# Patient Record
Sex: MALE | Race: WHITE | Employment: OTHER | URBAN - METROPOLITAN AREA
[De-identification: names, ages, dates, MRNs, and addresses within clinical notes are randomized per-mention and may not be internally consistent; named-entity substitution may affect disease eponyms.]

---

## 2020-01-01 ENCOUNTER — HOSPITAL ENCOUNTER (EMERGENCY)
Age: 65
Discharge: HOME | End: 2020-12-22
Attending: EMERGENCY MEDICINE

## 2020-01-01 ENCOUNTER — APPOINTMENT (OUTPATIENT)
Dept: GENERAL RADIOLOGY | Age: 65
DRG: 433 | End: 2020-01-01
Attending: EMERGENCY MEDICINE

## 2020-01-01 ENCOUNTER — HOSPITAL ENCOUNTER (INPATIENT)
Age: 65
LOS: 2 days | Discharge: HOME | DRG: 433 | End: 2020-12-12
Attending: EMERGENCY MEDICINE | Admitting: INTERNAL MEDICINE

## 2020-01-01 ENCOUNTER — HOSPITAL ENCOUNTER (EMERGENCY)
Age: 65
Discharge: HOME | End: 2020-10-16

## 2020-01-01 ENCOUNTER — APPOINTMENT (OUTPATIENT)
Dept: GENERAL RADIOLOGY | Age: 65
End: 2020-01-01

## 2020-01-01 ENCOUNTER — APPOINTMENT (OUTPATIENT)
Dept: INTERVENTIONAL RADIOLOGY/VASCULAR | Age: 65
DRG: 433 | End: 2020-01-01
Attending: INTERNAL MEDICINE

## 2020-01-01 ENCOUNTER — APPOINTMENT (OUTPATIENT)
Dept: ULTRASOUND IMAGING | Age: 65
End: 2020-01-01

## 2020-01-01 ENCOUNTER — APPOINTMENT (OUTPATIENT)
Dept: ULTRASOUND IMAGING | Age: 65
DRG: 433 | End: 2020-01-01
Attending: EMERGENCY MEDICINE

## 2020-01-01 VITALS
RESPIRATION RATE: 16 BRPM | SYSTOLIC BLOOD PRESSURE: 120 MMHG | TEMPERATURE: 97.7 F | HEART RATE: 89 BPM | HEIGHT: 78 IN | BODY MASS INDEX: 20.83 KG/M2 | WEIGHT: 180 LBS | OXYGEN SATURATION: 99 % | DIASTOLIC BLOOD PRESSURE: 71 MMHG

## 2020-01-01 VITALS
HEIGHT: 78 IN | DIASTOLIC BLOOD PRESSURE: 103 MMHG | TEMPERATURE: 98.6 F | OXYGEN SATURATION: 97 % | HEART RATE: 99 BPM | RESPIRATION RATE: 18 BRPM | WEIGHT: 180 LBS | BODY MASS INDEX: 20.83 KG/M2 | SYSTOLIC BLOOD PRESSURE: 145 MMHG

## 2020-01-01 VITALS
BODY MASS INDEX: 20.83 KG/M2 | WEIGHT: 180 LBS | DIASTOLIC BLOOD PRESSURE: 72 MMHG | TEMPERATURE: 98.4 F | SYSTOLIC BLOOD PRESSURE: 102 MMHG | RESPIRATION RATE: 16 BRPM | OXYGEN SATURATION: 98 % | HEART RATE: 84 BPM | HEIGHT: 78 IN

## 2020-01-01 DIAGNOSIS — H44.19 ENDOGENOUS ENDOPHTHALMITIS: Primary | ICD-10-CM

## 2020-01-01 DIAGNOSIS — R60.0 EXTREMITY EDEMA: Primary | ICD-10-CM

## 2020-01-01 DIAGNOSIS — E87.1 HYPONATREMIA: ICD-10-CM

## 2020-01-01 DIAGNOSIS — K70.31 ALCOHOLIC CIRRHOSIS OF LIVER WITH ASCITES (HCC): Primary | ICD-10-CM

## 2020-01-01 LAB
ALBUMIN FLD-MCNC: 0.6 G/DL
ALBUMIN SERPL-MCNC: 1.9 G/DL (ref 3.2–4.6)
ALBUMIN SERPL-MCNC: 2.2 G/DL (ref 3.2–4.6)
ALBUMIN SERPL-MCNC: 2.3 G/DL (ref 3.2–4.6)
ALBUMIN SERPL-MCNC: 2.9 G/DL (ref 3.2–4.6)
ALBUMIN/GLOB SERPL: 0.5 {RATIO} (ref 1.2–3.5)
ALBUMIN/GLOB SERPL: 0.6 {RATIO} (ref 1.2–3.5)
ALBUMIN/GLOB SERPL: 0.6 {RATIO} (ref 1.2–3.5)
ALBUMIN/GLOB SERPL: 0.7 {RATIO} (ref 1.2–3.5)
ALP SERPL-CCNC: 117 U/L (ref 50–136)
ALP SERPL-CCNC: 66 U/L (ref 50–136)
ALP SERPL-CCNC: 74 U/L (ref 50–136)
ALP SERPL-CCNC: 80 U/L (ref 50–136)
ALT SERPL-CCNC: 33 U/L (ref 12–65)
ALT SERPL-CCNC: 33 U/L (ref 12–65)
ALT SERPL-CCNC: 45 U/L (ref 12–65)
ALT SERPL-CCNC: 65 U/L (ref 12–65)
ANION GAP SERPL CALC-SCNC: 11 MMOL/L (ref 7–16)
ANION GAP SERPL CALC-SCNC: 5 MMOL/L (ref 7–16)
ANION GAP SERPL CALC-SCNC: 7 MMOL/L (ref 7–16)
ANION GAP SERPL CALC-SCNC: 9 MMOL/L (ref 7–16)
APPEARANCE FLD: CLEAR
APTT PPP: 36.4 SEC (ref 24.1–35.1)
AST SERPL-CCNC: 106 U/L (ref 15–37)
AST SERPL-CCNC: 209 U/L (ref 15–37)
AST SERPL-CCNC: 75 U/L (ref 15–37)
AST SERPL-CCNC: 81 U/L (ref 15–37)
ATRIAL RATE: 84 BPM
BACTERIA SPEC CULT: NORMAL
BASOPHILS # BLD: 0 K/UL (ref 0–0.2)
BASOPHILS # BLD: 0 K/UL (ref 0–0.2)
BASOPHILS # BLD: 0.1 K/UL (ref 0–0.2)
BASOPHILS # BLD: 0.1 K/UL (ref 0–0.2)
BASOPHILS NFR BLD: 1 % (ref 0–2)
BILIRUB SERPL-MCNC: 0.7 MG/DL (ref 0.2–1.1)
BILIRUB SERPL-MCNC: 3.9 MG/DL (ref 0.2–1.1)
BILIRUB SERPL-MCNC: 4.1 MG/DL (ref 0.2–1.1)
BILIRUB SERPL-MCNC: 4.1 MG/DL (ref 0.2–1.1)
BNP SERPL-MCNC: 531 PG/ML (ref 5–125)
BNP SERPL-MCNC: 85 PG/ML (ref 5–125)
BUN SERPL-MCNC: 3 MG/DL (ref 8–23)
BUN SERPL-MCNC: 5 MG/DL (ref 8–23)
BUN SERPL-MCNC: 7 MG/DL (ref 8–23)
BUN SERPL-MCNC: 7 MG/DL (ref 8–23)
CALCIUM SERPL-MCNC: 7.7 MG/DL (ref 8.3–10.4)
CALCIUM SERPL-MCNC: 7.8 MG/DL (ref 8.3–10.4)
CALCIUM SERPL-MCNC: 8 MG/DL (ref 8.3–10.4)
CALCIUM SERPL-MCNC: 8.7 MG/DL (ref 8.3–10.4)
CALCULATED P AXIS, ECG09: 70 DEGREES
CALCULATED R AXIS, ECG10: 67 DEGREES
CALCULATED T AXIS, ECG11: 52 DEGREES
CHLORIDE SERPL-SCNC: 94 MMOL/L (ref 98–107)
CHLORIDE SERPL-SCNC: 96 MMOL/L (ref 98–107)
CHLORIDE SERPL-SCNC: 98 MMOL/L (ref 98–107)
CHLORIDE SERPL-SCNC: 99 MMOL/L (ref 98–107)
CO2 SERPL-SCNC: 21 MMOL/L (ref 21–32)
CO2 SERPL-SCNC: 24 MMOL/L (ref 21–32)
CO2 SERPL-SCNC: 25 MMOL/L (ref 21–32)
CO2 SERPL-SCNC: 26 MMOL/L (ref 21–32)
COLOR FLD: YELLOW
CREAT SERPL-MCNC: 0.41 MG/DL (ref 0.8–1.5)
CREAT SERPL-MCNC: 0.45 MG/DL (ref 0.8–1.5)
CREAT SERPL-MCNC: 0.53 MG/DL (ref 0.8–1.5)
CREAT SERPL-MCNC: 0.54 MG/DL (ref 0.8–1.5)
DIAGNOSIS, 93000: NORMAL
DIFFERENTIAL METHOD BLD: ABNORMAL
EOSINOPHIL # BLD: 0 K/UL (ref 0–0.8)
EOSINOPHIL # BLD: 0.1 K/UL (ref 0–0.8)
EOSINOPHIL NFR BLD: 1 % (ref 0.5–7.8)
EOSINOPHIL NFR BLD: 2 % (ref 0.5–7.8)
ERYTHROCYTE [DISTWIDTH] IN BLOOD BY AUTOMATED COUNT: 14.1 % (ref 11.9–14.6)
ERYTHROCYTE [DISTWIDTH] IN BLOOD BY AUTOMATED COUNT: 15.7 % (ref 11.9–14.6)
ERYTHROCYTE [DISTWIDTH] IN BLOOD BY AUTOMATED COUNT: 15.8 % (ref 11.9–14.6)
ERYTHROCYTE [DISTWIDTH] IN BLOOD BY AUTOMATED COUNT: 15.9 % (ref 11.9–14.6)
GLOBULIN SER CALC-MCNC: 3.3 G/DL (ref 2.3–3.5)
GLOBULIN SER CALC-MCNC: 3.3 G/DL (ref 2.3–3.5)
GLOBULIN SER CALC-MCNC: 4.2 G/DL (ref 2.3–3.5)
GLOBULIN SER CALC-MCNC: 4.7 G/DL (ref 2.3–3.5)
GLUCOSE SERPL-MCNC: 77 MG/DL (ref 65–100)
GLUCOSE SERPL-MCNC: 87 MG/DL (ref 65–100)
GLUCOSE SERPL-MCNC: 92 MG/DL (ref 65–100)
GLUCOSE SERPL-MCNC: 95 MG/DL (ref 65–100)
GRAM STN SPEC: NORMAL
GRAM STN SPEC: NORMAL
HAV IGM SER QL: NONREACTIVE
HBV CORE IGM SER QL: NONREACTIVE
HBV SURFACE AG SER QL: NONREACTIVE
HCT VFR BLD AUTO: 29.8 % (ref 41.1–50.3)
HCT VFR BLD AUTO: 30.1 % (ref 41.1–50.3)
HCT VFR BLD AUTO: 36.4 % (ref 41.1–50.3)
HCT VFR BLD AUTO: 40.9 % (ref 41.1–50.3)
HCV AB SER QL: REACTIVE
HCV GENOTYPE: NORMAL
HCV GENTYP SERPL NAA+PROBE: NORMAL
HCV RNA SERPL NAA+PROBE-ACNC: 3410 IU/ML
HCV RNA SERPL NAA+PROBE-LOG IU: 3.53 LOG10 IU/ML
HGB BLD-MCNC: 11.2 G/DL (ref 13.6–17.2)
HGB BLD-MCNC: 11.2 G/DL (ref 13.6–17.2)
HGB BLD-MCNC: 13.4 G/DL (ref 13.6–17.2)
HGB BLD-MCNC: 14.5 G/DL (ref 13.6–17.2)
IMM GRANULOCYTES # BLD AUTO: 0 K/UL (ref 0–0.5)
IMM GRANULOCYTES # BLD AUTO: 0.1 K/UL (ref 0–0.5)
IMM GRANULOCYTES NFR BLD AUTO: 0 % (ref 0–5)
IMM GRANULOCYTES NFR BLD AUTO: 1 % (ref 0–5)
INR PPP: 1.4
LDH FLD L TO P-CCNC: 89 U/L
LYMPHOCYTES # BLD: 1.3 K/UL (ref 0.5–4.6)
LYMPHOCYTES # BLD: 1.5 K/UL (ref 0.5–4.6)
LYMPHOCYTES # BLD: 1.5 K/UL (ref 0.5–4.6)
LYMPHOCYTES # BLD: 2.1 K/UL (ref 0.5–4.6)
LYMPHOCYTES NFR BLD: 24 % (ref 13–44)
LYMPHOCYTES NFR BLD: 29 % (ref 13–44)
LYMPHOCYTES NFR BLD: 30 % (ref 13–44)
LYMPHOCYTES NFR BLD: 39 % (ref 13–44)
LYMPHOCYTES NFR BRONCH MANUAL: 67 %
MACROPHAGES NFR BRONCH MANUAL: 5 %
MAGNESIUM SERPL-MCNC: 2.1 MG/DL (ref 1.8–2.4)
MCH RBC QN AUTO: 36.6 PG (ref 26.1–32.9)
MCH RBC QN AUTO: 37.1 PG (ref 26.1–32.9)
MCH RBC QN AUTO: 38.2 PG (ref 26.1–32.9)
MCH RBC QN AUTO: 38.2 PG (ref 26.1–32.9)
MCHC RBC AUTO-ENTMCNC: 35.5 G/DL (ref 31.4–35)
MCHC RBC AUTO-ENTMCNC: 36.8 G/DL (ref 31.4–35)
MCHC RBC AUTO-ENTMCNC: 37.2 G/DL (ref 31.4–35)
MCHC RBC AUTO-ENTMCNC: 37.6 G/DL (ref 31.4–35)
MCV RBC AUTO: 101.7 FL (ref 79.6–97.8)
MCV RBC AUTO: 107.6 FL (ref 79.6–97.8)
MCV RBC AUTO: 99.5 FL (ref 79.6–97.8)
MCV RBC AUTO: 99.7 FL (ref 79.6–97.8)
MM INDURATION POC: NORMAL (ref 0–5)
MONOCYTES # BLD: 0.5 K/UL (ref 0.1–1.3)
MONOCYTES # BLD: 0.8 K/UL (ref 0.1–1.3)
MONOCYTES # BLD: 0.8 K/UL (ref 0.1–1.3)
MONOCYTES # BLD: 1 K/UL (ref 0.1–1.3)
MONOCYTES NFR BLD: 15 % (ref 4–12)
MONOCYTES NFR BLD: 16 % (ref 4–12)
MONOCYTES NFR BLD: 17 % (ref 4–12)
MONOCYTES NFR BLD: 9 % (ref 4–12)
NEUTROPHILS NFR BRONCH MANUAL: 28 %
NEUTS SEG # BLD: 2.3 K/UL (ref 1.7–8.2)
NEUTS SEG # BLD: 2.6 K/UL (ref 1.7–8.2)
NEUTS SEG # BLD: 2.7 K/UL (ref 1.7–8.2)
NEUTS SEG # BLD: 3.6 K/UL (ref 1.7–8.2)
NEUTS SEG NFR BLD: 49 % (ref 43–78)
NEUTS SEG NFR BLD: 51 % (ref 43–78)
NEUTS SEG NFR BLD: 53 % (ref 43–78)
NEUTS SEG NFR BLD: 57 % (ref 43–78)
NRBC # BLD: 0 K/UL (ref 0–0.2)
NUC CELL # FLD: 307 /CU MM
OTHER CELLS NFR BLD MANUAL: 239 %
P-R INTERVAL, ECG05: 144 MS
PLATELET # BLD AUTO: 149 K/UL (ref 150–450)
PLATELET # BLD AUTO: 46 K/UL (ref 150–450)
PLATELET # BLD AUTO: 48 K/UL (ref 150–450)
PLATELET # BLD AUTO: 56 K/UL (ref 150–450)
PLEASE NOTE, 550474: NORMAL
PMV BLD AUTO: 11.4 FL (ref 9.4–12.3)
PMV BLD AUTO: 11.7 FL (ref 9.4–12.3)
PMV BLD AUTO: 11.7 FL (ref 9.4–12.3)
PMV BLD AUTO: 12.2 FL (ref 9.4–12.3)
POTASSIUM SERPL-SCNC: 2.9 MMOL/L (ref 3.5–5.1)
POTASSIUM SERPL-SCNC: 3.6 MMOL/L (ref 3.5–5.1)
POTASSIUM SERPL-SCNC: 3.8 MMOL/L (ref 3.5–5.1)
POTASSIUM SERPL-SCNC: 4.2 MMOL/L (ref 3.5–5.1)
POTASSIUM SERPL-SCNC: 5.4 MMOL/L (ref 3.5–5.1)
PPD POC: NORMAL
PROT FLD-MCNC: 1.3 G/DL
PROT SERPL-MCNC: 5.2 G/DL (ref 6.3–8.2)
PROT SERPL-MCNC: 5.5 G/DL (ref 6.3–8.2)
PROT SERPL-MCNC: 6.5 G/DL (ref 6.3–8.2)
PROT SERPL-MCNC: 7.6 G/DL (ref 6.3–8.2)
PROTHROMBIN TIME: 17.5 SEC (ref 12.5–14.7)
Q-T INTERVAL, ECG07: 408 MS
QRS DURATION, ECG06: 90 MS
QTC CALCULATION (BEZET), ECG08: 482 MS
RBC # BLD AUTO: 2.93 M/UL (ref 4.23–5.6)
RBC # BLD AUTO: 3.02 M/UL (ref 4.23–5.6)
RBC # BLD AUTO: 3.66 M/UL (ref 4.23–5.6)
RBC # BLD AUTO: 3.8 M/UL (ref 4.23–5.6)
RBC # FLD: 1000 /CU MM
SERVICE CMNT-IMP: NORMAL
SODIUM SERPL-SCNC: 124 MMOL/L (ref 138–145)
SODIUM SERPL-SCNC: 128 MMOL/L (ref 136–145)
SODIUM SERPL-SCNC: 131 MMOL/L (ref 136–145)
SODIUM SERPL-SCNC: 132 MMOL/L (ref 136–145)
SPECIMEN SOURCE FLD: NORMAL
TEST INFORMATION, 550045: NORMAL
VENTRICULAR RATE, ECG03: 84 BPM
VIT B12 SERPL-MCNC: 1510 PG/ML (ref 193–986)
WBC # BLD AUTO: 4.5 K/UL (ref 4.3–11.1)
WBC # BLD AUTO: 4.9 K/UL (ref 4.3–11.1)
WBC # BLD AUTO: 5.5 K/UL (ref 4.3–11.1)
WBC # BLD AUTO: 6.2 K/UL (ref 4.3–11.1)

## 2020-01-01 PROCEDURE — 74011250637 HC RX REV CODE- 250/637: Performed by: INTERNAL MEDICINE

## 2020-01-01 PROCEDURE — 76705 ECHO EXAM OF ABDOMEN: CPT

## 2020-01-01 PROCEDURE — 84132 ASSAY OF SERUM POTASSIUM: CPT

## 2020-01-01 PROCEDURE — 93005 ELECTROCARDIOGRAM TRACING: CPT | Performed by: EMERGENCY MEDICINE

## 2020-01-01 PROCEDURE — 74011250636 HC RX REV CODE- 250/636: Performed by: EMERGENCY MEDICINE

## 2020-01-01 PROCEDURE — 71045 X-RAY EXAM CHEST 1 VIEW: CPT

## 2020-01-01 PROCEDURE — 77030014146 HC TY THORCENT/PARACENT BD -B

## 2020-01-01 PROCEDURE — 93005 ELECTROCARDIOGRAM TRACING: CPT

## 2020-01-01 PROCEDURE — 89050 BODY FLUID CELL COUNT: CPT

## 2020-01-01 PROCEDURE — 86580 TB INTRADERMAL TEST: CPT | Performed by: INTERNAL MEDICINE

## 2020-01-01 PROCEDURE — 83880 ASSAY OF NATRIURETIC PEPTIDE: CPT

## 2020-01-01 PROCEDURE — 49083 ABD PARACENTESIS W/IMAGING: CPT

## 2020-01-01 PROCEDURE — 80053 COMPREHEN METABOLIC PANEL: CPT

## 2020-01-01 PROCEDURE — 97535 SELF CARE MNGMENT TRAINING: CPT

## 2020-01-01 PROCEDURE — 82042 OTHER SOURCE ALBUMIN QUAN EA: CPT

## 2020-01-01 PROCEDURE — 96374 THER/PROPH/DIAG INJ IV PUSH: CPT

## 2020-01-01 PROCEDURE — 87522 HEPATITIS C REVRS TRNSCRPJ: CPT

## 2020-01-01 PROCEDURE — 85025 COMPLETE CBC W/AUTO DIFF WBC: CPT

## 2020-01-01 PROCEDURE — 36415 COLL VENOUS BLD VENIPUNCTURE: CPT

## 2020-01-01 PROCEDURE — 97116 GAIT TRAINING THERAPY: CPT

## 2020-01-01 PROCEDURE — 74011250636 HC RX REV CODE- 250/636: Performed by: INTERNAL MEDICINE

## 2020-01-01 PROCEDURE — 87902 NFCT AGT GNTYP ALYS HEP C: CPT

## 2020-01-01 PROCEDURE — 84157 ASSAY OF PROTEIN OTHER: CPT

## 2020-01-01 PROCEDURE — 85610 PROTHROMBIN TIME: CPT

## 2020-01-01 PROCEDURE — 82607 VITAMIN B-12: CPT

## 2020-01-01 PROCEDURE — 97165 OT EVAL LOW COMPLEX 30 MIN: CPT

## 2020-01-01 PROCEDURE — P9045 ALBUMIN (HUMAN), 5%, 250 ML: HCPCS | Performed by: INTERNAL MEDICINE

## 2020-01-01 PROCEDURE — 71046 X-RAY EXAM CHEST 2 VIEWS: CPT

## 2020-01-01 PROCEDURE — 99284 EMERGENCY DEPT VISIT MOD MDM: CPT

## 2020-01-01 PROCEDURE — 99283 EMERGENCY DEPT VISIT LOW MDM: CPT

## 2020-01-01 PROCEDURE — 80074 ACUTE HEPATITIS PANEL: CPT

## 2020-01-01 PROCEDURE — 74011000302 HC RX REV CODE- 302: Performed by: INTERNAL MEDICINE

## 2020-01-01 PROCEDURE — 97112 NEUROMUSCULAR REEDUCATION: CPT

## 2020-01-01 PROCEDURE — 65270000029 HC RM PRIVATE

## 2020-01-01 PROCEDURE — 87205 SMEAR GRAM STAIN: CPT

## 2020-01-01 PROCEDURE — 77030010507 HC ADH SKN DERMBND J&J -B

## 2020-01-01 PROCEDURE — 97110 THERAPEUTIC EXERCISES: CPT

## 2020-01-01 PROCEDURE — 0W9G3ZZ DRAINAGE OF PERITONEAL CAVITY, PERCUTANEOUS APPROACH: ICD-10-PCS | Performed by: RADIOLOGY

## 2020-01-01 PROCEDURE — 85730 THROMBOPLASTIN TIME PARTIAL: CPT

## 2020-01-01 PROCEDURE — 83615 LACTATE (LD) (LDH) ENZYME: CPT

## 2020-01-01 PROCEDURE — 93971 EXTREMITY STUDY: CPT

## 2020-01-01 PROCEDURE — 97161 PT EVAL LOW COMPLEX 20 MIN: CPT

## 2020-01-01 PROCEDURE — 83735 ASSAY OF MAGNESIUM: CPT

## 2020-01-01 RX ORDER — FAMOTIDINE 20 MG/1
20 TABLET, FILM COATED ORAL 2 TIMES DAILY
Status: DISCONTINUED | OUTPATIENT
Start: 2020-01-01 | End: 2020-01-01 | Stop reason: HOSPADM

## 2020-01-01 RX ORDER — LORAZEPAM 1 MG/1
1 TABLET ORAL
Qty: 9 TAB | Refills: 0 | Status: SHIPPED | OUTPATIENT
Start: 2020-01-01

## 2020-01-01 RX ORDER — ONDANSETRON 2 MG/ML
4 INJECTION INTRAMUSCULAR; INTRAVENOUS
Status: DISCONTINUED | OUTPATIENT
Start: 2020-01-01 | End: 2020-01-01 | Stop reason: HOSPADM

## 2020-01-01 RX ORDER — FUROSEMIDE 40 MG/1
40 TABLET ORAL DAILY
Qty: 30 TAB | Refills: 0 | Status: SHIPPED | OUTPATIENT
Start: 2020-01-01 | End: 2021-01-01

## 2020-01-01 RX ORDER — FOLIC ACID 1 MG/1
1 TABLET ORAL DAILY
Qty: 30 TAB | Refills: 0 | Status: SHIPPED | OUTPATIENT
Start: 2020-01-01 | End: 2021-01-01

## 2020-01-01 RX ORDER — LORAZEPAM 2 MG/ML
1 INJECTION INTRAMUSCULAR
Status: DISCONTINUED | OUTPATIENT
Start: 2020-01-01 | End: 2020-01-01 | Stop reason: HOSPADM

## 2020-01-01 RX ORDER — POTASSIUM CHLORIDE 20 MEQ/1
40 TABLET, EXTENDED RELEASE ORAL
Status: COMPLETED | OUTPATIENT
Start: 2020-01-01 | End: 2020-01-01

## 2020-01-01 RX ORDER — SPIRONOLACTONE 25 MG/1
25 TABLET ORAL DAILY
Qty: 30 TAB | Refills: 0 | Status: SHIPPED | OUTPATIENT
Start: 2020-01-01 | End: 2021-01-01

## 2020-01-01 RX ORDER — FUROSEMIDE 10 MG/ML
40 INJECTION INTRAMUSCULAR; INTRAVENOUS
Status: COMPLETED | OUTPATIENT
Start: 2020-01-01 | End: 2020-01-01

## 2020-01-01 RX ORDER — ALBUMIN HUMAN 50 G/1000ML
25 SOLUTION INTRAVENOUS ONCE
Status: COMPLETED | OUTPATIENT
Start: 2020-01-01 | End: 2020-01-01

## 2020-01-01 RX ORDER — LANOLIN ALCOHOL/MO/W.PET/CERES
400 CREAM (GRAM) TOPICAL DAILY
Status: DISCONTINUED | OUTPATIENT
Start: 2020-01-01 | End: 2020-01-01 | Stop reason: HOSPADM

## 2020-01-01 RX ORDER — POTASSIUM CHLORIDE 14.9 MG/ML
20 INJECTION INTRAVENOUS
Status: DISCONTINUED | OUTPATIENT
Start: 2020-01-01 | End: 2020-01-01

## 2020-01-01 RX ORDER — ACETAMINOPHEN 325 MG/1
650 TABLET ORAL
Status: DISCONTINUED | OUTPATIENT
Start: 2020-01-01 | End: 2020-01-01

## 2020-01-01 RX ORDER — FUROSEMIDE 20 MG/1
40 TABLET ORAL 2 TIMES DAILY
Status: DISCONTINUED | OUTPATIENT
Start: 2020-01-01 | End: 2020-01-01 | Stop reason: HOSPADM

## 2020-01-01 RX ORDER — SODIUM CHLORIDE 9 MG/ML
50 INJECTION, SOLUTION INTRAVENOUS CONTINUOUS
Status: DISCONTINUED | OUTPATIENT
Start: 2020-01-01 | End: 2020-01-01

## 2020-01-01 RX ORDER — SPIRONOLACTONE 25 MG/1
25 TABLET ORAL DAILY
Status: DISCONTINUED | OUTPATIENT
Start: 2020-01-01 | End: 2020-01-01

## 2020-01-01 RX ORDER — LANOLIN ALCOHOL/MO/W.PET/CERES
400 CREAM (GRAM) TOPICAL DAILY
Qty: 30 TAB | Refills: 0 | Status: SHIPPED | OUTPATIENT
Start: 2020-01-01 | End: 2021-01-01

## 2020-01-01 RX ORDER — FUROSEMIDE 10 MG/ML
40 INJECTION INTRAMUSCULAR; INTRAVENOUS 2 TIMES DAILY
Status: DISCONTINUED | OUTPATIENT
Start: 2020-01-01 | End: 2020-01-01

## 2020-01-01 RX ORDER — LANOLIN ALCOHOL/MO/W.PET/CERES
100 CREAM (GRAM) TOPICAL DAILY
Status: DISCONTINUED | OUTPATIENT
Start: 2020-01-01 | End: 2020-01-01 | Stop reason: HOSPADM

## 2020-01-01 RX ORDER — POTASSIUM CHLORIDE 20 MEQ/1
40 TABLET, EXTENDED RELEASE ORAL 2 TIMES DAILY
Status: DISCONTINUED | OUTPATIENT
Start: 2020-01-01 | End: 2020-01-01

## 2020-01-01 RX ORDER — LANOLIN ALCOHOL/MO/W.PET/CERES
100 CREAM (GRAM) TOPICAL DAILY
Qty: 30 TAB | Refills: 0 | Status: SHIPPED | OUTPATIENT
Start: 2020-01-01 | End: 2021-01-01

## 2020-01-01 RX ORDER — IBUPROFEN 200 MG
1 TABLET ORAL EVERY 24 HOURS
Status: DISCONTINUED | OUTPATIENT
Start: 2020-01-01 | End: 2020-01-01 | Stop reason: HOSPADM

## 2020-01-01 RX ORDER — SPIRONOLACTONE 25 MG/1
50 TABLET ORAL DAILY
Status: DISCONTINUED | OUTPATIENT
Start: 2020-01-01 | End: 2020-01-01 | Stop reason: HOSPADM

## 2020-01-01 RX ORDER — FUROSEMIDE 40 MG/1
40 TABLET ORAL DAILY
Qty: 20 TAB | Refills: 0 | Status: SHIPPED | OUTPATIENT
Start: 2020-01-01 | End: 2020-01-01

## 2020-01-01 RX ADMIN — SPIRONOLACTONE 50 MG: 25 TABLET ORAL at 10:08

## 2020-01-01 RX ADMIN — Medication 100 MG: at 09:57

## 2020-01-01 RX ADMIN — SODIUM CHLORIDE 50 ML/HR: 900 INJECTION, SOLUTION INTRAVENOUS at 21:54

## 2020-01-01 RX ADMIN — TUBERCULIN PURIFIED PROTEIN DERIVATIVE 5 UNITS: 5 INJECTION, SOLUTION INTRADERMAL at 21:51

## 2020-01-01 RX ADMIN — ALBUMIN (HUMAN) 25 G: 12.5 INJECTION, SOLUTION INTRAVENOUS at 08:15

## 2020-01-01 RX ADMIN — FAMOTIDINE 20 MG: 20 TABLET, FILM COATED ORAL at 10:08

## 2020-01-01 RX ADMIN — Medication 100 MG: at 10:08

## 2020-01-01 RX ADMIN — FAMOTIDINE 20 MG: 20 TABLET, FILM COATED ORAL at 21:50

## 2020-01-01 RX ADMIN — FAMOTIDINE 20 MG: 20 TABLET, FILM COATED ORAL at 08:13

## 2020-01-01 RX ADMIN — POTASSIUM CHLORIDE 40 MEQ: 20 TABLET, EXTENDED RELEASE ORAL at 13:40

## 2020-01-01 RX ADMIN — FUROSEMIDE 40 MG: 10 INJECTION, SOLUTION INTRAMUSCULAR; INTRAVENOUS at 18:11

## 2020-01-01 RX ADMIN — FUROSEMIDE 40 MG: 10 INJECTION, SOLUTION INTRAMUSCULAR; INTRAVENOUS at 08:13

## 2020-01-01 RX ADMIN — POTASSIUM CHLORIDE 40 MEQ: 20 TABLET, EXTENDED RELEASE ORAL at 13:17

## 2020-01-01 RX ADMIN — Medication 400 MG: at 13:17

## 2020-01-01 RX ADMIN — FAMOTIDINE 20 MG: 20 TABLET, FILM COATED ORAL at 18:11

## 2020-01-01 RX ADMIN — FUROSEMIDE 40 MG: 10 INJECTION, SOLUTION INTRAMUSCULAR; INTRAVENOUS at 14:56

## 2020-01-01 RX ADMIN — SPIRONOLACTONE 25 MG: 25 TABLET ORAL at 09:57

## 2020-10-16 NOTE — ED PROVIDER NOTES
50-year-old male complaining of left lower extremity swelling. He also has some swelling his right leg but not as bad. Been gone for over a week. Patient does not have any medical problems but does not go to a physician in many many years. Patient denies chest pain shortness of breath no injuries to the legs. Leg Swelling This is a new problem. The current episode started more than 1 week ago. The problem occurs constantly. The problem has been gradually worsening. The pain is present in the left lower leg and right lower leg. The quality of the pain is described as aching. The pain is at a severity of 7/10. The pain is moderate. Associated symptoms include stiffness. He has tried nothing for the symptoms. There has been no history of extremity trauma. No past medical history on file. No past surgical history on file. No family history on file. Social History Socioeconomic History  Marital status:  Spouse name: Not on file  Number of children: Not on file  Years of education: Not on file  Highest education level: Not on file Occupational History  Not on file Social Needs  Financial resource strain: Not on file  Food insecurity Worry: Not on file Inability: Not on file  Transportation needs Medical: Not on file Non-medical: Not on file Tobacco Use  Smoking status: Not on file Substance and Sexual Activity  Alcohol use: Not on file  Drug use: Not on file  Sexual activity: Not on file Lifestyle  Physical activity Days per week: Not on file Minutes per session: Not on file  Stress: Not on file Relationships  Social connections Talks on phone: Not on file Gets together: Not on file Attends Adventism service: Not on file Active member of club or organization: Not on file Attends meetings of clubs or organizations: Not on file Relationship status: Not on file  Intimate partner violence Fear of current or ex partner: Not on file Emotionally abused: Not on file Physically abused: Not on file Forced sexual activity: Not on file Other Topics Concern  Not on file Social History Narrative  Not on file ALLERGIES: Patient has no known allergies. Review of Systems Constitutional: Negative. Negative for activity change. HENT: Negative. Eyes: Negative. Respiratory: Negative. Cardiovascular: Negative. Gastrointestinal: Negative. Genitourinary: Negative. Musculoskeletal: Positive for stiffness. Skin: Negative. Neurological: Negative. Psychiatric/Behavioral: Negative. All other systems reviewed and are negative. Vitals:  
 10/16/20 1305 BP: (!) 146/88 Pulse: 88 Resp: 16 Temp: 99.5 °F (37.5 °C) SpO2: 97% Weight: 81.6 kg (180 lb) Height: 6' 7\" (2.007 m) Physical Exam 
Vitals signs and nursing note reviewed. Constitutional:   
   General: He is not in acute distress. Appearance: Normal appearance. He is well-developed. He is not ill-appearing, toxic-appearing or diaphoretic. HENT:  
   Head: Normocephalic and atraumatic. No right periorbital erythema or left periorbital erythema. Jaw: There is normal jaw occlusion. Salivary Glands: Right salivary gland is not diffusely enlarged. Left salivary gland is not diffusely enlarged. Right Ear: External ear normal.  
   Left Ear: External ear normal.  
   Nose: Nose normal. No congestion or rhinorrhea. Mouth/Throat:  
   Mouth: Mucous membranes are moist.  
   Pharynx: No oropharyngeal exudate or posterior oropharyngeal erythema. Eyes:  
   General: Lids are normal. No scleral icterus. Right eye: No discharge. Left eye: No discharge. Extraocular Movements: Extraocular movements intact. Conjunctiva/sclera: Conjunctivae normal.  
   Right eye: Right conjunctiva is not injected. Left eye: Left conjunctiva is not injected. Pupils: Pupils are equal, round, and reactive to light. Neck: Musculoskeletal: Full passive range of motion without pain, normal range of motion and neck supple. Normal range of motion. No erythema, neck rigidity, injury, pain with movement or muscular tenderness. Thyroid: No thyroid mass. Vascular: No JVD. Trachea: Trachea and phonation normal.  
Cardiovascular:  
   Rate and Rhythm: Normal rate and regular rhythm. Pulses: Normal pulses. Heart sounds: Normal heart sounds. Heart sounds not distant. No murmur. No friction rub. No gallop. Pulmonary:  
   Effort: Pulmonary effort is normal. No tachypnea, accessory muscle usage, respiratory distress or retractions. Breath sounds: No stridor. No decreased breath sounds, wheezing, rhonchi or rales. Chest:  
   Chest wall: No tenderness. Abdominal:  
   General: Abdomen is flat. Bowel sounds are normal. There is no distension. There are no signs of injury. Palpations: Abdomen is soft. There is no fluid wave, mass or pulsatile mass. Tenderness: There is no abdominal tenderness. There is no guarding or rebound. Musculoskeletal: Normal range of motion. General: Swelling present. No tenderness, deformity or signs of injury. Right lower leg: Edema present. Left lower leg: Edema present. Lymphadenopathy:  
   Cervical: No cervical adenopathy. Skin: 
   General: Skin is warm and dry. Capillary Refill: Capillary refill takes less than 2 seconds. Coloration: Skin is not jaundiced or pale. Findings: No bruising, erythema or rash. Neurological:  
   General: No focal deficit present. Mental Status: He is alert and oriented to person, place, and time. Mental status is at baseline. GCS: GCS eye subscore is 4. GCS verbal subscore is 5. GCS motor subscore is 6. Cranial Nerves: No dysarthria or facial asymmetry. Sensory: No sensory deficit. Motor: No weakness or tremor. Coordination: Coordination normal.  
Psychiatric:     
   Mood and Affect: Mood normal.     
   Behavior: Behavior normal. Behavior is cooperative. Thought Content: Thought content normal.     
   Judgment: Judgment normal.  
 
  
 
MDM Number of Diagnoses or Management Options Diagnosis management comments: Swelling to lower extremities pulses are good capillary refill is normal no signs of infection. Amount and/or Complexity of Data Reviewed Clinical lab tests: ordered and reviewed Tests in the radiology section of CPT®: ordered and reviewed Tests in the medicine section of CPT®: ordered and reviewed Risk of Complications, Morbidity, and/or Mortality Presenting problems: high Diagnostic procedures: high Management options: high Patient Progress Patient progress: stable Procedures

## 2020-10-16 NOTE — DISCHARGE INSTRUCTIONS
Patient Education        Leg and Ankle Edema: Care Instructions  Your Care Instructions  Swelling in the legs, ankles, and feet is called edema. It is common after you sit or stand for a while. Long plane flights or car rides often cause swelling in the legs and feet. You may also have swelling if you have to stand for long periods of time at your job. Problems with the veins in the legs (varicose veins) and changes in hormones can also cause swelling. Sometimes the swelling in the ankles and feet is caused by a more serious problem, such as heart failure, infection, blood clots, or liver or kidney disease. Follow-up care is a key part of your treatment and safety. Be sure to make and go to all appointments, and call your doctor if you are having problems. It's also a good idea to know your test results and keep a list of the medicines you take. How can you care for yourself at home? · If your doctor gave you medicine, take it as prescribed. Call your doctor if you think you are having a problem with your medicine. · Whenever you are resting, raise your legs up. Try to keep the swollen area higher than the level of your heart. · Take breaks from standing or sitting in one position. ? Walk around to increase the blood flow in your lower legs. ? Move your feet and ankles often while you stand, or tighten and relax your leg muscles. · Wear support stockings. Put them on in the morning, before swelling gets worse. · Eat a balanced diet. Lose weight if you need to. · Limit the amount of salt (sodium) in your diet. Salt holds fluid in the body and may increase swelling. When should you call for help? Call 911 anytime you think you may need emergency care. For example, call if:    · You have symptoms of a blood clot in your lung (called a pulmonary embolism). These may include:  ? Sudden chest pain. ? Trouble breathing. ? Coughing up blood.    Call your doctor now or seek immediate medical care if:    · You have signs of a blood clot, such as:  ? Pain in your calf, back of the knee, thigh, or groin. ? Redness and swelling in your leg or groin.     · You have symptoms of infection, such as:  ? Increased pain, swelling, warmth, or redness. ? Red streaks or pus. ? A fever. Watch closely for changes in your health, and be sure to contact your doctor if:    · Your swelling is getting worse.     · You have new or worsening pain in your legs.     · You do not get better as expected. Where can you learn more? Go to http://www.gray.com/  Enter O164 in the search box to learn more about \"Leg and Ankle Edema: Care Instructions. \"  Current as of: June 26, 2019               Content Version: 12.6  © 8490-3894 [employer]. Care instructions adapted under license by [employer] (which disclaims liability or warranty for this information). If you have questions about a medical condition or this instruction, always ask your healthcare professional. [address] any warranty or liability for your use of this information.

## 2020-10-16 NOTE — ED TRIAGE NOTES
Pt ambulatory to triage. Pt states he has BLE pitting edema x 1 week without hx of CHF or DVT. Pt denies fever, cough, SOB, or exposure to COVID. Pt denies cp. Pt states he has been cramping in hands and legs.

## 2020-12-10 PROBLEM — R19.7 DIARRHEA: Status: ACTIVE | Noted: 2020-01-01

## 2020-12-10 PROBLEM — F17.200 NICOTINE DEPENDENCE: Status: ACTIVE | Noted: 2020-01-01

## 2020-12-10 PROBLEM — Z66 DNR (DO NOT RESUSCITATE): Status: ACTIVE | Noted: 2020-01-01

## 2020-12-10 PROBLEM — R17 ELEVATED BILIRUBIN: Status: ACTIVE | Noted: 2020-01-01

## 2020-12-10 PROBLEM — R18.8 ASCITES: Status: ACTIVE | Noted: 2020-01-01

## 2020-12-10 PROBLEM — D69.6 THROMBOCYTOPENIA (HCC): Status: ACTIVE | Noted: 2020-01-01

## 2020-12-10 PROBLEM — E87.1 HYPONATREMIA: Status: ACTIVE | Noted: 2020-01-01

## 2020-12-10 PROBLEM — F10.20 ALCOHOL DEPENDENCE (HCC): Status: ACTIVE | Noted: 2020-01-01

## 2020-12-10 PROBLEM — K74.60 LIVER CIRRHOSIS (HCC): Status: ACTIVE | Noted: 2020-01-01

## 2020-12-10 NOTE — ED PROVIDER NOTES
71-year-old male presenting for worsening lower extremity edema. Seen 1 month ago for same. At that time reports the edema was below his knees. Now it is all the way up to his thighs and he feels like his abdomen is more distended. He does admit to daily drinking for many years. During his prior work-up DVT was ruled out the patient was discharged home on Lasix. His symptoms have not improved. Denies fevers chills. He is also complaining of some diarrhea and decreased appetite. The history is provided by the patient. Leg Swelling This is a recurrent problem. The current episode started more than 1 week ago. The problem has been gradually worsening. The pain is present in the left upper leg, left lower leg, right upper leg and right lower leg. The quality of the pain is described as aching. The pain is at a severity of 5/10. The pain is moderate. Pertinent negatives include no numbness, full range of motion, no stiffness, no tingling, no itching, no back pain and no neck pain. The symptoms are aggravated by palpation. He has tried nothing for the symptoms. The treatment provided no relief. There has been no history of extremity trauma. No past medical history on file. No past surgical history on file. No family history on file. Social History Socioeconomic History  Marital status:  Spouse name: Not on file  Number of children: Not on file  Years of education: Not on file  Highest education level: Not on file Occupational History  Not on file Social Needs  Financial resource strain: Not on file  Food insecurity Worry: Not on file Inability: Not on file  Transportation needs Medical: Not on file Non-medical: Not on file Tobacco Use  Smoking status: Not on file Substance and Sexual Activity  Alcohol use: Not on file  Drug use: Not on file  Sexual activity: Not on file Lifestyle  Physical activity Days per week: Not on file Minutes per session: Not on file  Stress: Not on file Relationships  Social connections Talks on phone: Not on file Gets together: Not on file Attends Sabianism service: Not on file Active member of club or organization: Not on file Attends meetings of clubs or organizations: Not on file Relationship status: Not on file  Intimate partner violence Fear of current or ex partner: Not on file Emotionally abused: Not on file Physically abused: Not on file Forced sexual activity: Not on file Other Topics Concern  Not on file Social History Narrative  Not on file ALLERGIES: Patient has no known allergies. Review of Systems Constitutional: Positive for activity change and appetite change. Cardiovascular: Positive for leg swelling. Gastrointestinal: Positive for abdominal distention. Musculoskeletal: Negative for back pain, neck pain and stiffness. Skin: Negative for itching. Neurological: Negative for tingling and numbness. All other systems reviewed and are negative. Vitals:  
 12/10/20 1053 BP: (!) 160/81 Pulse: 96  
Resp: 18 Temp: 97.6 °F (36.4 °C) SpO2: 96% Weight: 81.6 kg (180 lb) Height: 6' 7\" (2.007 m) Physical Exam 
Vitals signs and nursing note reviewed. Constitutional:   
   Appearance: He is well-developed. HENT:  
   Head: Normocephalic and atraumatic. Eyes:  
   Extraocular Movements: Extraocular movements intact. Conjunctiva/sclera: Conjunctivae normal.  
Neck: Musculoskeletal: Normal range of motion and neck supple. Cardiovascular:  
   Rate and Rhythm: Normal rate and regular rhythm. Heart sounds: Normal heart sounds. Pulmonary:  
   Effort: Pulmonary effort is normal.  
   Comments: Crackles at the bases bilaterally Abdominal:  
   General: Bowel sounds are normal. There is distension. Palpations: Abdomen is soft. Tenderness: There is no abdominal tenderness. Musculoskeletal: Normal range of motion. General: No deformity. Right lower leg: Edema present. Left lower leg: Edema present. Skin: 
   General: Skin is warm and dry. Neurological:  
   Mental Status: He is alert and oriented to person, place, and time. Cranial Nerves: No cranial nerve deficit. Psychiatric:     
   Behavior: Behavior normal.  
 
  
 
MDM Number of Diagnoses or Management Options Alcoholic cirrhosis of liver with ascites ([facility] [address]): Hyponatremia:  
Diagnosis management comments: 61-year-old male presenting for worsening lower extremity edema and abdominal distention. Likely new onset cirrhosis with portal hypertension resulting in lower extremity edema. Amount and/or Complexity of Data Reviewed Clinical lab tests: ordered and reviewed (Results for orders placed or performed during the hospital encounter of 12/10/20 
-CBC WITH AUTOMATED DIFF Result                      Value             Ref Range WBC                         6.2               4.3 - 11.1 K* 
     RBC                         3.66 (L)          4.23 - 5.6 M* HGB                         13.4 (L)          13.6 - 17.2 * HCT                         36.4 (L)          41.1 - 50.3 % MCV                         99.5 (H)          79.6 - 97.8 * MCH                         36.6 (H)          26.1 - 32.9 * MCHC                        36.8 (H)          31.4 - 35.0 * RDW                         15.7 (H)          11.9 - 14.6 % PLATELET                    56 (L)            150 - 450 K/* MPV                         11.7              9.4 - 12.3 FL ABSOLUTE NRBC               0.00              0.0 - 0.2 K/* DF                          AUTOMATED NEUTROPHILS                 57                43 - 78 % LYMPHOCYTES                 24                13 - 44 % MONOCYTES                   16 (H)            4.0 - 12.0 % EOSINOPHILS                 1                 0.5 - 7.8 % BASOPHILS                   1                 0.0 - 2.0 % IMMATURE GRANULOCYTES       1                 0.0 - 5.0 %   
     ABS. NEUTROPHILS            3.6               1.7 - 8.2 K/* ABS. LYMPHOCYTES            1.5               0.5 - 4.6 K/* ABS. MONOCYTES              1.0               0.1 - 1.3 K/* ABS. EOSINOPHILS            0.1               0.0 - 0.8 K/* ABS. BASOPHILS              0.1               0.0 - 0.2 K/* ABS. IMM. GRANS.            0.1               0.0 - 0.5 K/* 
-METABOLIC PANEL, COMPREHENSIVE Result                      Value             Ref Range Sodium                      124 (L)           138 - 145 mm* Potassium                   4.2               3.5 - 5.1 mm* Chloride                    94 (L)            98 - 107 mmo* CO2                         21                21 - 32 mmol* Anion gap                   9                 7 - 16 mmol/L Glucose                     87                65 - 100 mg/* BUN                         5 (L)             8 - 23 MG/DL Creatinine                  0.53 (L)          0.8 - 1.5 MG* 
     GFR est AA                  >60               >60 ml/min/1* GFR est non-AA              >60               >60 ml/min/1* Calcium                     8.7               8.3 - 10.4 M* Bilirubin, total            4.1 (H)           0.2 - 1.1 MG* ALT (SGPT)                  45                12 - 65 U/L   
     AST (SGOT)                  106 (H)           15 - 37 U/L Alk. phosphatase            80                50 - 136 U/L Protein, total              6.5               6.3 - 8.2 g/* Albumin                     2.3 (L)           3.2 - 4.6 g/*      Globulin                    4.2 (H)           2.3 - 3.5 g/* 
 A-G Ratio                   0.5 (L)           1.2 - 3.5     
-PTT Result                      Value             Ref Range aPTT                        36.4 (H)          24.1 - 35.1 * 
-PROTHROMBIN TIME + INR Result                      Value             Ref Range Prothrombin time            17.5 (H)          12.5 - 14.7 * INR                         1.4                             
-NT-PRO BNP Result                      Value             Ref Range NT pro-BNP                  531 (H)           5 - 125 PG/ML 
) Tests in the radiology section of CPT®: ordered and reviewed ([address] [place] [address] Result Date: 12/10/2020 EXAM: US ABD LTD INDICATION: abdominal distension and lower extremity edema COMPARISON: None. TECHNIQUE: Real-time sonography of the right upper abdomen abdomen was performed with multiple static images of the liver, gallbladder, pancreas, spleen, kidneys, abdominal aorta and IVC obtained. FINDINGS: Liver: Mildly enlarged liver measuring 18.6 cm. Increased parenchymal echogenicity. Coarse echotexture. No flow detected in the portal vein. Ascites. Biliary: There is gallbladder sludge or fine layering gallstones. No shadowing gallstones demonstrated. The extrahepatic biliary duct measures 2.7 mm. There is thickening of the gallbladder wall which may be pseudothickening due to the ascites. Pancreas: The pancreas is normal.  Right kidney: The kidney demonstrate normal echogenicity with no mass, stone or hydronephrosis. The right kidney measures 13.6 cm in length. Aorta/IVC: Not visualized. IMPRESSION: Gallbladder sludge or fine nonshadowing gallstones. No evidence of biliary duct obstruction. . Cirrhotic morphology to the liver. Ascites. Flow in the portal vein could not be confirmed. ) Tests in the medicine section of CPT®: ordered and reviewed Decide to obtain previous medical records or to obtain history from someone other than the patient: yes Discuss the patient with other providers: yes (Discussed case with the hospitalist will assume care and consult gastroenterology as needed) Independent visualization of images, tracings, or specimens: yes (Reviewed the patient's ultrasound) Risk of Complications, Morbidity, and/or Mortality Presenting problems: high Diagnostic procedures: high Management options: high General comments: I personally reviewed the patient's vital signs, laboratory tests, and/or radiological findings. I discussed these findings with the patient and their significance. I answered all questions and explained that given these findings there is significant concern for increased morbidity and/or mortality without immediate intervention. As a result, I recommended admission to the hospital, consulted the appropriate service, and transitioned care to that service in improved condition Patient Progress Patient progress: stable Procedures

## 2020-12-10 NOTE — ED TRIAGE NOTES
Pt returns to ED with complaint of leg swelling. States he was seen here in October and given prescription for diuretic. Denies CP. States cough with SOB

## 2020-12-10 NOTE — H&P
History and Physical 
 
Patient: Og Casas MRN: [id number]  SSN: [id number] YOB: 1955  Age: 60 y.o. Sex: male Subjective: 
Cc\" I have edema\" Og Casas is a 60 y.o. male who has a PMH of chronic alcohol abuse, smoker; who came after he experienced progressive pedal edema, now up to the lower abdomen, associated with weakness, decreased appetite and weakness. The patient visited the ED ~1-2 months ago due to pedal edema, he was given a prescription of lasix but his symptoms continue to worsen. He denies chest pain, sob, fever, palpitations. He does have ongoing watery diarrhea for 1 week. The patient is a daily alcoholic ( 6 beers daily ); his last drink was yesterday. Upon ed arrival vs were stable. Labs: cbc no leukocytosis; hb 13g; platelets: 47L Chemistry: sodium 124; cr: 0.5 INR: 1.4 Elevated bilirubin: 4 Alt/ast: 45/106 Cxr: negative 
usg abdomen: liver cirrhosis, ascites. The patient will be admitted with a diagnosis of liver cirrhosis and new onset ascites, like related to chronic alcohol use. ROS: as above Social hx: chronic alcohol, smoker 1 ppd Family hx: non contributory for heart disease. Reviewed with the patient Social History Tobacco Use  Smoking status: Not on file Substance Use Topics  Alcohol use: Not on file Prior to Admission medications Not on File No Known Allergies Review of Systems: A comprehensive review of systems was negative except for that written in the History of Present Illness. Objective:  
 
Vitals:  
 12/10/20 1053 12/10/20 1456 BP: (!) 160/81 137/70 Pulse: 96 99 Resp: 18 Temp: 97.6 °F (36.4 °C) SpO2: 96% Weight: 81.6 kg (180 lb) Height: 6' 7\" (2.007 m) Physical Exam: 
General:  Alert, cooperative, no distress, appears stated age. Eyes:  Conjunctivae/corneas clear. PERRL, EOMs intact. Fundi benign; icteric sclerae Ears:  Normal TMs and external ear canals both ears. Nose: Nares normal. Septum midline. Mucosa normal. No drainage or sinus tenderness. Mouth/Throat: Lips, mucosa, and tongue normal. Teeth and gums normal.  
Neck: Supple, symmetrical, trachea midline, no adenopathy, thyroid: no enlargment/tenderness/nodules, no carotid bruit and no JVD. Back:   Symmetric, no curvature. ROM normal. No CVA tenderness. Lungs:   Clear to auscultation bilaterally. Heart:  Regular rate and rhythm, S1, S2 normal, no murmur, click, rub or gallop. Abdomen:   Soft, distended; non tender, Bowel sounds normal. No masses,  No organomegaly. Extremities: Pedal edema tense, up to the lower abdomen Pulses: 2+ and symmetric all extremities. Skin: Skin color, texture, turgor normal. No rashes or lesions Lymph nodes: Cervical, supraclavicular, and axillary nodes normal.  
Neurologic: CNII-XII intact. Normal strength, sensation and reflexes throughout. Assessment:  
 
Hospital Problems  Never Reviewed Codes Class Noted POA Hyponatremia ICD-10-CM: E87.1 ICD-9-CM: 276.1  12/10/2020 Unknown Liver cirrhosis (HCC) ICD-10-CM: K74.60 ICD-9-CM: 571.5  12/10/2020 Unknown * (Principal) Ascites ICD-10-CM: R18.8 ICD-9-CM: 789.59  12/10/2020 Unknown Elevated bilirubin ICD-10-CM: R17 
ICD-9-CM: 277.4  12/10/2020 Unknown [facility] ICD-10-CM: F10.20 ICD-9-CM: 303.90  12/10/2020 Yes Nicotine dependence ICD-10-CM: N87.205 ICD-9-CM: 305.1  12/10/2020 Yes Plan:  
-New onset ascites 
-Alcoholic liver cirrhosis + elevated LFTs 
-Pedal edema: Will admit as inpatient Regular diet Avoid hepatotoxic medications Check hepatitis panel IR US guided paracentesis tomorrow Check albumin, cell count, glucose, ldh, culture,protein Daily labs -Hyponatremia, euvolemic: 
Possible related to liver disease Start low rate IVFs for 24 hrs Monitor chemistry daily Check serum osml, urinary osm, chloride 
 
-Acute diarrhea: 
Watery Check c difficile, stool culture  
 
-Chronic alcohol abuse: 
Last drink yesterday Ativan iv prn Thiamine 
 
-Nicotine dependence: start nicotine patch 
 
-Thrombocytopenia: possible due to liver cirrhosis Avoid heparin derivatives Monitor cbc  
 
DVT ppx: compression stockings Code status: DNR. Confirmed with the patient Estimated LOS > 2MN Risk: high Estimated DC planning: home Goals of care discussed with the patient Signed By: Cecilia Pereira MD   
 December 10, 2020

## 2020-12-11 NOTE — PROCEDURES
Department of Interventional Radiology 
[phone] Interventional Radiology Brief Procedure Note Patient: Natividad Severs MRN: [id number]  SSN: [id number] YOB: 1955  Age: 60 y.o. Sex: male Date of Procedure: 12/11/2020 Pre-Procedure Diagnosis: ascites, cirrhosis Post-Procedure Diagnosis: SAME Procedure(s): Paracentesis Brief Description of Procedure: US guided paracentesis, 1050 ml thin yellow fluid removed Performed By: Syeda Negron PA-C Assistants: None Anesthesia:Lidocaine Estimated Blood Loss: None Specimens:  ascites Implants:  None Findings: large volume ascites Complications: None Recommendations: per primary team  
 
Follow Up: prn Signed By: Syeda Negron PA-C December 11, 2020

## 2020-12-11 NOTE — PROGRESS NOTES
Paracentesis completed. 1050 ml yellow colored fluid removed. Catheter removed and manual pressure held until hemostasis. Surgical adhesive applied to puncture site.

## 2020-12-11 NOTE — PROGRESS NOTES
ACUTE OT GOALS: 
(Developed with and agreed upon by patient and/or caregiver.) 1. Patient will complete lower body bathing in standing with Supervision and adaptive equipment as needed. 2. Patient will complete lower body dressing in standing with Supervision and adaptive equipment as needed. 3. Patient will complete toileting with Supervision and adaptive equipment as needed. 4. Patient will complete bed mobility with independence and no verbal cues. 5. Patient will complete functional transfers with Supervision and adaptive equipment as needed. 6. Patient will complete standing grooming ADL with Supervision and no loss of balance. Timeframe: 7 visits OCCUPATIONAL THERAPY ASSESSMENT: Initial Assessment, Daily Note and PM OT Treatment Day # 1 Yvette Dillon is a 60 y.o. male PRIMARY DIAGNOSIS: Ascites Reason for Referral:  Generalized Weakness ICD-10: Treatment Diagnosis: Generalized Muscle Weakness (M62.81) Difficulty in walking, Not elsewhere classified (R26.2) INPATIENT: Payor: MEDICAID PENDING / Plan: Brielle Matthews PENDING / Product Type: Medicaid /  
ASSESSMENT:  
 
REHAB RECOMMENDATIONS:  
Recommendation to date pending progress: 
Setting: 
? Short-term Rehab vs. [facility] Equipment: ? To Be Determined INITIAL/CURRENT LEVEL OF FUNCTION: 
(Most Recently Demonstrated) PRIOR LEVEL OF FUNCTION: 
(Prior to Hospitalization) Bathing: ? Minimal Assistance Dressing: ? Minimal Assistance Feeding/Grooming: 
? Contact Guard Assistance in standing Toileting: ? Minimal Assistance Bathing: ? Independent Dressing: ? Independent Feeding/Grooming: ? Independent Toileting: ? Independent ASSESSMENT: 
Mr. Sindi Grant presents with deficits in strength, balance, and activity tolerance for performance of ADLs and functional mobility, resulting in pt requiring CGA/Min A to complete standing ADLs and transfers.  Pt also presents with decreased balance and activity tolerance for household and community distances, requiring Min A x 2 with use of RW to ambulate from bed to sink this session. Pt would benefit from skilled OT to increase pt independence and safety for performance of ADLs and functional mobility. SUBJECTIVE:  
Mr. Sandy Mckinnon states, \"I've had 8 falls\". SOCIAL HISTORY/LIVING ENVIRONMENT:  
Pt lives with friend in one story home with [address] [facility]). Pt is typically independent for performance of  
ADLs, IADLs, and driving. Pt reports that he uses RW as needed for functional mobility. Pt reports 8 falls 
within the last year. Pt has a son who lives nearby. OBJECTIVE:  
 
PAIN: VITAL SIGNS: LINES/DRAINS:  
Pre Treatment: Pain Screen Pain Scale 1: Numeric (0 - 10) Pain Intensity 1: 0 Post Treatment: 8/10 pain reported in BLE following mobility. RN was notified. Bp: 115/60 following functional mobility. O2 sats 95% and above throughout session. O2 Device: Room air GROSS EVALUATION: 
BUE Within Functional Limits Abnormal/ Functional Abnormal/ Non-Functional (see comments) Not Tested Comments: AROM [x] [] [] [] PROM [x] [] [] [] Strength [x] [] [] [] Overall strength decreased Balance [] [x] [] [] Posture [] [x] [] [] Sensation [x] [] [] [] Coordination [x] [] [] [] Tone [] [] [] [x] Edema [] [x] [] [] BLE edema observed Activity Tolerance [] [x] [] [] Pt presents with increased fatigue following standing ADLs.  
 [] [] [] [] COGNITION/ 
PERCEPTION: Intact Impaired  
(see comments) Comments:  
Orientation [x] [] Vision [x] [] Hearing [x] [] Judgment/ Insight [x] [] Attention [x] [] Memory [x] [] Command Following [x] [] Emotional Regulation [x] []   
 [] [] ACTIVITIES OF DAILY LIVING: I Mod I S SBA CGA Min Mod Max Total NT Comments BASIC ADLs:             
Bathing/ Showering [] [] [] [] [] [x] [] [] [] [] Toileting [] [] [] [] [] [x] [] [] [] [] Dressing [] [] [] [] [] [x] [] [] [] [] Feeding [] [] [x] [] [] [] [] [] [] []   
Grooming [] [] [] [] [x] [] [] [] [] [] In standing Personal Device Care [] [] [] [] [] [] [] [] [] [x] Functional Mobility [] [] [] [] [x] [x] [] [] [] [] For transfers; Min A x 2 with use of RW for increased distances. INSTRUMENTAL ADLs:             
Care of Others [] [] [] [] [] [] [] [] [x] [] Community Mobility [] [] [] [] [] [x] [] [] [] [] Assist x 2 with use of RW Financial Management [] [] [] [] [] [] [x] [] [] [] Home Management [] [] [] [] [] [] [] [] [x] [] Meal Preparation [] [] [] [] [] [] [] [] [x] [] Health Management [] [] [] [] [] [] [x] [] [] []   
Work/Leisure [] [] [] [] [] [] [] [] [x] [] I=Independent, Mod I=Modified Independent, S=Supervision, SBA=Standby Assistance, CGA=Contact Guard Assistance,  
Min=Minimal Assistance, Mod=Moderate Assistance, Max=Maximal Assistance, Total=Total Assistance, NT=Not Tested MOBILITY: I Mod I S SBA CGA Min Mod Max Total  NT x2 Comments:  
Supine to sit [] [] [x] [] [] [] [] [] [] [] [] Sit to supine [] [] [x] [] [] [] [] [] [] [] [] Sit to stand [] [] [] [] [x] [x] [] [] [] [] [] Bed to chair [] [] [] [] [] [x] [] [] [] [] [x] With use of RW  
I=Independent, Mod I=Modified Independent, S=Supervision, SBA=Standby Assistance, CGA=Contact Guard Assistance,  
Min=Minimal Assistance, Mod=Moderate Assistance, Max=Maximal Assistance, Total=Total Assistance, NT=Not Tested [facility] Daily Activity Inpatient Short Form How much help from another person does the patient currently need. .. Total A Lot A Little None 1. Putting on and taking off regular lower body clothing? [] 1   [] 2   [x] 3   [] 4  
2. Bathing (including washing, rinsing, drying)? [] 1   [] 2   [x] 3   [] 4  
3.   Toileting, which includes using toilet, bedpan or urinal?   [] 1   [] 2   [x] 3   [] 4  
 4.  Putting on and taking off regular upper body clothing? [] 1   [] 2   [x] 3   [] 4  
5. Taking care of personal grooming such as brushing teeth? [] 1   [] 2   [x] 3   [] 4  
6. Eating meals? [] 1   [] 2   [] 3   [x] 4  
© 2007, Trustees of [address] [place] [address] [address] [address], under license to [employer]. All rights reserved Score:  Initial: 19, completed, 12/11/2020 Most Recent: X (Date: -- ) Interpretation of Tool:  Represents activities that are increasingly more difficult (i.e. Bed mobility, Transfers, Gait). PLAN:  
FREQUENCY/DURATION: OT Plan of Care: 3 times/week for duration of hospital stay or until stated goals are met, which ever comes first. 
 
PROBLEM LIST:  
(Skilled intervention is medically necessary to address:) 1. Decreased ADL/Functional Activities 2. Decreased Activity Tolerance 3. Decreased Balance 4. Decreased Strength 5. Decreased Transfer Abilities 6. Increased Pain BLE following mobility INTERVENTIONS PLANNED:  
(Benefits and precautions of occupational therapy have been discussed with the patient.) 1. Self Care Training 2. Therapeutic Activity 3. Therapeutic Exercise/HEP 4. Neuromuscular Re-education 5. Education TREATMENT:  
 
EVALUATION: Low Complexity : (Untimed Charge) TREATMENT:  
Self Care (38 Minutes): Self care including Upper Body Bathing, Lower Body Bathing, Upper Body Dressing, Lower Body Dressing and Grooming to increase independence and decrease level of assistance required. Pt requires Min A x 2 with use of RW to walk from bed to sink. Pt requires CGA/intermittent Min A for standing balance to wash face and wash hands at sink. Pt requires Min A for gown management and Set Up to complete upper body bathing seated edge of bed. Pt requires Set Up/Supervision to complete sock management and bathe BLE in seated position.  Pt requires CGA/Min A with use of HHA to complete sit to stand and Min A to begin to doff soiled undergarment in standing. Pt requires CGA/Min A for balance to complete genital and perineal bathing in standing. Pt requires CGA/Min A to complete stand to sit and Min A to doff remainder of undergarment in seated. AFTER TREATMENT POSITION/PRECAUTIONS: 
Alarm Activated, Bed, Needs within reach, RN notified and pt son seated adjacent INTERDISCIPLINARY COLLABORATION: 
RN/PCT, PT/PTA and OT/VILLEGAS TOTAL TREATMENT DURATION: 
OT Patient Time In/Time Out Time In: 1430 Time Out: 1513 KATHY Cardozo/GOPAL

## 2020-12-11 NOTE — ED NOTES
TRANSFER - OUT REPORT: 
 
Verbal report given to JUJU Harman on Nelli Meadows  being transferred to PACU overflow for routine progression of care Report consisted of patients Situation, Background, Assessment and  
Recommendations(SBAR). Information from the following report(s) SBAR, ED Summary and MAR was reviewed with the receiving nurse. Lines:  
Peripheral IV 12/10/20 Right Hand (Active) Opportunity for questions and clarification was provided.

## 2020-12-11 NOTE — PROGRESS NOTES
PT Note: 
Evaluation received and treatment attempted. Per ER pt about to transfer to 9th floor overflow. Will re-attempt pending schedule and pt status. Thanks, Yoel Lima, PT, DPT

## 2020-12-11 NOTE — PROGRESS NOTES
ACUTE PHYSICAL THERAPY GOALS: 
(Developed with and agreed upon by patient and/or caregiver. ) LTG: 
(1.)Mr. Herbert Solano will move from supine to sit and sit to supine , scoot up and down and roll side to side in bed with MODIFIED INDEPENDENCE within 7 treatment day(s). (2.)Mr. Herbert Solano will transfer from bed to chair and chair to bed with MODIFIED INDEPENDENCE using the least restrictive device within 7 treatment day(s). (3.)Mr. Herbert Solano will ambulate with SUPERVISION for 300 feet with the least restrictive device within 7 treatment day(s). (4.)Mr. Herbert Solano will participate in therapeutic activity/exercises x 25 minutes with no rest breaks for increased activity tolerance within 7 treatment days. (5.)Mr. Herbert Solano will ascend and descend 3 stairs using L hand rail(s) with SUPERVISION to improve functional mobility and safety within 7 day(s). ________________________________________________________________________________________________ PHYSICAL THERAPY ASSESSMENT: Initial Assessment and PM PT Treatment Day # 1 Tom Grant is a 60 y.o. male PRIMARY DIAGNOSIS: Ascites Reason for Referral: ICD-10: Treatment Diagnosis: Generalized Muscle Weakness (M62.81) Difficulty in walking, Not elsewhere classified (R26.2) Repeated Falls (R29.6) History of falling (Z91.81) INPATIENT: Payor: MEDICAID PENDING / Plan: Dk Soni PENDING / Product Type: Medicaid /  
 
ASSESSMENT:  
 
REHAB RECOMMENDATIONS:  
Recommendation to date pending progress: 
Setting: 
? STR vs HH Equipment: ? To Be Determined INITIAL/CURRENT LEVEL OF FUNCTION: 
(Most Recently Demonstrated) PRIOR LEVEL OF FUNCTION: 
(Prior to Hospitalization) Bed Mobility: 
? Supervision Sit to Stand: 
? Contact Guard Assistance x 2 Transfers: ? Minimal Assistance x 2 Gait/Mobility: ? Minimal Assistance x 2 Bed Mobility: 
? Independent Sit to Stand: ? Independent Transfers: 
? Independent Gait/Mobility: ? Modified Independent ASSESSMENT: 
Mr. Mary Leija presents to PT with decreased strength and AROM in B LEs. Pt also demonstrated balance deficits and decreased activity tolerance. He appears to have pitting edema in B LEs. OT addressed ADLs today while PT addressed transfers/ambulation. Pt could benefit from skilled PT to address above deficits. SUBJECTIVE:  
Mr. Mary Leija states, \"I haven't been feeling good\". SOCIAL HISTORY/LIVING ENVIRONMENT:  
Home Environment: Private residence # Steps to Enter: 5 One/Two Story Residence: One story Living Alone: No 
Support Systems: Friends \ neighbors OBJECTIVE:  
 
PAIN: VITAL SIGNS: LINES/DRAINS:  
Pre Treatment: Pain Screen Pain Scale 1: Numeric (0 - 10) Pain Intensity 1: 0 Post Treatment: 0   IV 
O2 Device: Room air GROSS EVALUATION: 
B LEs Within Functional Limits Abnormal/ Functional Abnormal/ Non-Functional (see comments) Not Tested Comments: AROM [x] [] [] [] PROM [] [] [] [x] Strength [] [x] [] [] Balance [] [x] [] [] Posture [] [x] [] [] Sensation [] [] [] [] Coordination [] [x] [] [] Tone [] [] [] [] Edema [] [x] [] [] B LEs Activity Tolerance [] [] [x] [] SOB with ambulation  
 [] [] [] [] COGNITION/ 
PERCEPTION: Intact Impaired  
(see comments) Comments:  
Orientation [x] [] Vision [] [] Hearing [] [x] Command Following [x] [] Safety Awareness [] [x]   
 [] [] MOBILITY: I Mod I S SBA CGA Min Mod Max Total  NT x2 Comments:  
Bed Mobility Rolling [] [] [x] [] [] [] [] [] [] [] [] Supine to Sit [] [] [x] [] [] [] [] [] [] [] [] Scooting [] [] [x] [] [] [] [] [] [] [] [] Sit to Supine [] [] [x] [] [] [] [] [] [] [] []   
Transfers Sit to Stand [] [] [] [] [x] [] [] [] [] [] [] Bed to Chair [] [] [] [] [] [] [] [] [] [x] [] Stand to Sit [] [] [] [] [x] [] [] [] [] [] [] I=Independent, Mod I=Modified Independent, S=Supervision, SBA=Standby Assistance, CGA=Contact Ariel Schwab,  
Min=Minimal Assistance, Mod=Moderate Assistance, Max=Maximal Assistance, Total=Total Assistance, NT=Not Tested GAIT: I Mod I S SBA CGA Min Mod Max Total  NT Comments:  
Level of Assistance [] [] [] [] [] [x] [] [] [] [] Leilani x 2 Distance 20 ft x 2 DME Rolling [place] and [employer] Gait Quality Decreased B step length and gait speed I=Independent, Mod I=Modified Independent, S=Supervision, SBA=Standby Assistance, CGA=Contact Guard Assistance,  
Min=Minimal Assistance, Mod=Moderate Assistance, Max=Maximal Assistance, Total=Total Assistance, NT=Not Tested [facility] Mobility Inpatient Short Form How much difficulty does the patient currently have. .. Unable A Lot A Little None 1. Turning over in bed (including adjusting bedclothes, sheets and blankets)? [] 1   [] 2   [] 3   [x] 4  
2. Sitting down on and standing up from a chair with arms ( e.g., wheelchair, bedside commode, etc.)   [] 1   [] 2   [x] 3   [] 4  
3. Moving from lying on back to sitting on the side of the bed? [] 1   [] 2   [x] 3   [] 4 How much help from another person does the patient currently need. .. Total A Lot A Little None 4. Moving to and from a bed to a chair (including a wheelchair)? [] 1   [] 2   [x] 3   [] 4  
5. Need to walk in hospital room? [] 1   [] 2   [x] 3   [] 4  
6. Climbing 3-5 steps with a railing? [] 1   [x] 2   [] 3   [] 4  
© 2007, Trustees of [address] [place] [address] [address] [id number], under license to [employer]. All rights reserved Score:  Initial: 18 Most Recent: X (Date: -- ) Interpretation of Tool:  Represents activities that are increasingly more difficult (i.e. Bed mobility, Transfers, Gait). PLAN:  
FREQUENCY/DURATION: PT Plan of Care: 3 times/week for duration of hospital stay or until stated goals are met, which ever comes first. 
 
PROBLEM LIST:  
(Skilled intervention is medically necessary to address:) 1. Decreased ADL/Functional Activities 2. Decreased Activity Tolerance 3. Decreased AROM/PROM 4. Decreased Balance 5. Decreased Coordination 6. Decreased Gait Ability 7. Decreased Strength 8. Decreased Transfer Abilities INTERVENTIONS PLANNED:  
(Benefits and precautions of physical therapy have been discussed with the patient.) 1. Therapeutic Activity 2. Therapeutic Exercise/HEP 3. Neuromuscular Re-education 4. Gait Training 5. Manual Therapy 6. Education TREATMENT:  
 
EVALUATION: Low Complexity : (Untimed Charge) TREATMENT:  
($$ Therapeutic Exercises: 23-37 mins 
$$ Neuromuscular Re-education: 8-22 mins    ) Therapeutic Exercise (30 Minutes): Therapeutic exercises noted below to improve functional activity tolerance, strength and mobility. Gait Training (8 Minutes): Gait training for 20 ft x 2 feet utilizing [employer] [place] and [employer]. Patient required Manual, Tactile, Verbal and Visual cueing to improve Activity Pacing, Assistive Device Utilization and Gait Mechanics. Co-Treatment PT/OT necessary due to patient's decreased overall endurance/tolerance levels, as well as need for high level skilled assistance to complete functional transfers/mobility and functional tasks AFTER TREATMENT POSITION/PRECAUTIONS: 
Bed and RN notified INTERDISCIPLINARY COLLABORATION: 
RN/PCT, PT/PTA and OT/VILLEGAS TOTAL TREATMENT DURATION: 
PT Patient Time In/Time Out Time In: 1430 Time Out: 1513 Antonieta Estrada PT, DPT

## 2020-12-11 NOTE — PROGRESS NOTES
Pt received. from ED via stretcher. Pt alert and oriented x4. Pt not having any pain or distress at this time. Son is at bedside. Pt made aware of surroundings. IV patent in his right hand. No needs or concerns voiced at this time. Pt is on room air. Pt has an abrasion to his left arm from a past fall. Pt also has some bruising to his R buttocks area from a fall.

## 2020-12-11 NOTE — PROGRESS NOTES
Hospitalist Progress Note 2020 Admit Date: 12/10/2020 10:56 AM  
NAME: Alina Mccauley :  1955 MRN:  [id number] Attending: Bear Hernandez MD 
PCP:  None SUBJECTIVE:  
 
Alina Mccauley is a 60 y.o. male who has a PMH of chronic alcohol abuse, smoker; who came after he experienced progressive pedal edema, now up to the lower abdomen, associated with weakness, decreased appetite and weakness. The patient visited the ED ~1-2 months ago due to pedal edema, he was given a prescription of lasix but his symptoms continue to worsen. He denies chest pain, sob, fever, palpitations. He does have ongoing watery diarrhea for 1 week. The patient is a daily alcoholic ( 6 beers daily ); his last drink was yesterday. Upon ed arrival vs were stable. 
  
Labs: cbc no leukocytosis; hb 13g; platelets: 23U Chemistry: sodium 124; cr: 0.5 INR: 1.4 Elevated bilirubin: 4 Alt/ast: 45/106 Cxr: negative 
usg abdomen: liver cirrhosis, ascites. 
  
The patient will be admitted with a diagnosis of liver cirrhosis and new onset ascites, like related to chronic alcohol use. Interval History (): patient examined at bedside. Just had diagnostic/therapeutic paracentesis completed without complication. Feels like his abdomen is better now. Son at bedside. Never had a paracentesis before. Says \"I definitely want to stop drinking now. \" No fevers/chills, chest pain, shortness of breath. Review of Systems negative with exception of pertinent positives noted above PHYSICAL EXAM  
 
Visit Vitals /63 Pulse 90 Temp 98.4 °F (36.9 °C) Resp 17 Ht 6' 7\" (2.007 m) Wt 81.6 kg (180 lb) SpO2 95% BMI 20.28 kg/m² Temp (24hrs), Av.4 °F (36.9 °C), Min:98.4 °F (36.9 °C), Max:98.4 °F (36.9 °C) Oxygen Therapy O2 Sat (%): 95 % (20 1349) Pulse via Oximetry: 87 beats per minute (20 1006) O2 Device: Room air (20 0795) Intake/Output Summary (Last 24 hours) at 12/11/2020 1656 Last data filed at 12/11/2020 1103 Gross per 24 hour Intake 200 ml Output  Net 200 ml General: No acute distress   
Lungs:  CTA Bilaterally. Heart:  Regular rate and rhythm,  No murmur, rub, or gallop Abdomen: Distended abdomen that is nontender to palpation, bandage to LLQ with no active bleeding Extremities: No cyanosis, clubbing or edema Neurologic:  No focal deficits ASSESSMENT Active Hospital Problems Diagnosis Date Noted  Hyponatremia 12/10/2020  Liver cirrhosis ([facility] [address] 12/10/2020  Ascites 12/10/2020  Elevated bilirubin 12/10/2020  Alcohol dependence ([facility] [address] 12/10/2020  
 Nicotine dependence 12/10/2020  Thrombocytopenia ([facility] [address] 12/10/2020  Diarrhea 12/10/2020  DNR (do not resuscitate) 12/10/2020 Plan: # Alcoholic liver cirrhosis, last drink was yesterday 
- consult to GI 
- sodium restrictive diet 
- start Lasix and Aldactone 
- alcohol cessation counseling  
- will need screening EGD for varices 
- discriminant function score of 27, no indication for steroids currently - trend PT/INR 
- trend LFTs 
- hepatitis panel ordered and pending - Ativan PRN for alcohol withdrawal 
- folate/B12/thiamine supplementation # Hyponatremia 
- likely due to above 
- interval improvement 
- continue with management as above # Thrombocytopenia 
- likely due to above 
- hold chemical anticoagulation for DVT prophylaxis for platelet count <92U 
 
F/E/N: no fluids, replete electrolytes as needed, cardiac diet Ppx: SCDs for VTE Code Status: FULL CODE Disposition: pending clinical improvement with plan as above. Discussed with patient and son at bedside. ? [place] in 1-2 days. All questions answered. Signed By: Diann Servin DO   
 December 11, 2020

## 2020-12-12 PROBLEM — R76.8 HEPATITIS C ANTIBODY POSITIVE IN BLOOD: Status: ACTIVE | Noted: 2020-01-01

## 2020-12-12 NOTE — PROGRESS NOTES
Pt transported to room 902 via wheelchair. Assisted to bed and pt was given call light. Staff on floor notified of pts arrival. Pt told me he would notify son of room change.

## 2020-12-12 NOTE — CONSULTS
[facility] Consult Note Primary GI Physician: [place] Referring Provider:  Dr Alcides Jiang Date:  12/12/2020 Admit Date:  12/10/2020 Chief Complaint:  New Diagnosis of Liver Disease Subjective:  
 
History of Present Illness:  Patient is a 60 y.o. male with PMH of including but not limited to chronic alcohol abuse and tobacco abuse, who is seen in consultation at the request of Dr. Ger Baca for liver disease. He came to the ED 12/10 with progressive pedal edema, now up to the lower abdomen, associated with weakness, decreased appetite and weakness. The patient visited the ED ~1-2 months ago due to pedal edema, he was given a prescription of lasix but his symptoms continue to worsen. He does have ongoing watery diarrhea for 1 week. The patient is a daily alcoholic (6 beers daily), last drink was 12/9/20. He reports a 45-50 year ETOH use hx. ABD US 12/10 with gallbladder sludge or fine nonshadowing gallstones, no evidence of biliary duct obstruction, cirrhotic morphology to the liver, ascites, flow in the portal vein could not be confirmed. On Admit TB 4.1 H, AP 80,  H, ALT 65. INR 1.4, PT 17.5. Viral hep positive for Hep C. Paracentesis yesterday with 1050 cc fluid removed. Lasix 40 and Aldactone 50 mg started. No prior EGD or COLO. He denies tarry stool or BRRB. Some off and on diarrhea. No family hx of liver disease or GI malignancy. Denies blood transfusion. Reports remote IV drug use 24 yo. Denies tattoos. PMH: 
No past medical history on file. PSH: 
Past Surgical History:  
Procedure Laterality Date  IR PARACENTESIS ABD W IMAGE  12/11/2020 Allergies: 
No Known Allergies Home Medications: 
Prior to Admission medications Not on File Hospital Medications: 
Current Facility-Administered Medications Medication Dose Route Frequency  furosemide (LASIX) injection 40 mg  40 mg IntraVENous BID  
  spironolactone (ALDACTONE) tablet 50 mg  50 mg Oral DAILY  ondansetron (ZOFRAN) injection 4 mg  4 mg IntraVENous Q6H PRN  
 nicotine (NICODERM CQ) 21 mg/24 hr patch 1 Patch  1 Patch TransDERmal Q24H  
 LORazepam (ATIVAN) injection 1 mg  1 mg IntraVENous Q4H PRN  thiamine HCL (B-1) tablet 100 mg  100 mg Oral DAILY  famotidine (PEPCID) tablet 20 mg  20 mg Oral BID Social History: 
Social History Tobacco Use  Smoking status: Not on file Substance Use Topics  Alcohol use: Not on file Pt denies any history of drug use, blood transfusions, or tattoos. Family History: No family history on file. Review of Systems: A detailed 10 system ROS is obtained, with pertinent positives as listed above. All others are negative. Diet:   
 
Objective:  
 
Physical Exam: 
Vitals: 
Visit Vitals /68 (BP 1 Location: Left arm, BP Patient Position: At rest) Pulse 93 Temp 97.7 °F (36.5 °C) Resp 18 Ht 6' 7\" (2.007 m) Wt 81.6 kg (180 lb) SpO2 97% BMI 20.28 kg/m² Gen:  Pt is alert, cooperative, no acute distress Skin:  Extremities and face reveal no rashes. HEENT: Sclerae anicteric. Extra-occular muscles are intact. No oral ulcers. Cardiovascular: Regular rate and rhythm. No murmurs, gallops, or rubs. Respiratory:  Comfortable breathing with no accessory muscle use. Clear breath sounds anteriorly with no wheezes, rales, or rhonchi. GI:  Abdomen distended, soft, and nontender. Normal active bowel sounds. Rectal:  Deferred Musculoskeletal:  +2 pitting edema of the lower legs. Neurological:  Gross memory appears intact. Patient is alert and oriented. Psychiatric:  Mood appears appropriate with judgement intact. Laboratory:   
Recent Labs 12/12/20 
0501 12/11/20 
0804 12/10/20 
1144 WBC 4.5 4.9 6.2 HGB 11.2* 11.2* 13.4* HCT 30.1* 29.8* 36.4*  
PLT 46* 48* 56* MCV 99.7* 101.7* 99.5* * 128* 124* K 2.9* 3.6 4.2 CL 96* 98 94*  
 CO2 24 25 21 BUN 7* 7* 5*  
CREA 0.45* 0.41* 0.53* CA 7.8* 7.7* 8.7 GLU 92 77 87 AP 74 66 80 ALT 33 33 45 TBILI 3.9* 4.1* 4.1* ALB 2.2* 1.9* 2.3* TP 5.5* 5.2* 6.5 PTP  --   --  17.5* INR  --   --  1.4 APTT  --   --  36.4* LVP 12/11/20 Procedure:  Maximal sterile barrier technique was used. Following routine prep 
and drape of the abdomen, a local field block with 1% lidocaine was achieved. Ultrasound evaluation was performed. Fluid was identified in the peritoneal cavity. Using real-time ultrasound 
guidance, the peritoneal cavity was accessed with an 8 Sudanese centesis catheter. The catheter was connected to wall suction. A total of 1050 cc of thin yellow fluid was removed. The catheter was removed 
and a bandage applied. Complications: None. Findings: Large-volume ascites. IMPRESSION Impression: Uncomplicated ultrasound guided paracentesis. ABD US 12/10/20 FINDINGS: 
Liver: Mildly enlarged liver measuring 18.6 cm. Increased parenchymal 
echogenicity. Coarse echotexture. No flow detected in the portal vein. Ascites. Biliary: There is gallbladder sludge or fine layering gallstones. No shadowing 
gallstones demonstrated. The extrahepatic biliary duct measures 2.7 mm. There is 
thickening of the gallbladder wall which may be pseudothickening due to the 
ascites. Pancreas: The pancreas is normal.   
Right kidney: The kidney demonstrate normal echogenicity with no mass, stone or 
hydronephrosis. The right kidney measures 13.6 cm in length. Aorta/IVC: Not visualized. IMPRESSION:  
Gallbladder sludge or fine nonshadowing gallstones. No evidence of biliary duct 
obstruction. [place] Cirrhotic morphology to the liver. Ascites. Flow in the portal vein could not be 
confirmed. 
  
 
Assessment:  
 
Principal Problem: 
  Ascites (12/10/2020) Active Problems: Hyponatremia (12/10/2020) Liver cirrhosis ([address] (12/10/2020) Elevated bilirubin (12/10/2020) Alcohol dependence ([facility] [address] (12/10/2020) Nicotine dependence (12/10/2020) Thrombocytopenia ([facility] [address] (12/10/2020) Diarrhea (12/10/2020) DNR (do not resuscitate) (12/10/2020) 60 y.o. male with PMH of including but not limited to chronic alcohol abuse, remote IV drug use and tobacco abuse admitted with edema and ascites. No hx of liver disease. ABD US 12/10 with gallbladder sludge or fine nonshadowing gallstones, no evidence of biliary duct obstruction, cirrhotic morphology to the liver, ascites, flow in the portal vein could not be confirmed. On Admit TB 4.1 H, AP 80,  H, ALT 65. INR 1.4, PT 17.5. Viral hep positive for Hep C. Paracentesis 11/11 with 1050 cc fluid removed. Lasix 40 and Aldactone 50 mg started. No prior EGD or COLO. Plan:  
 
- Paracentesis 12/11 with 1050 cc removed and fluid studies not CW SBP 
- Viral panel positive for Hep C, will get genotype and RNA 
- Monitor LFT's, PT/INR 
- Discriminate function 17 on 12/10 - No benefit from steroids - Monitor for ETOH withdrawal  
- Will need EGD out patient to evalaute for esophogeal varices - Needs out patient screening colonoscopy - 2 gram Na diet - When stable OK to DC with GI follow up for continued management of liver disease Shahana Smith NP Patient is seen and examined in collaboration with Dr. Huma Miles. Assessment and plan as per Dr. Evan Hendrix.

## 2020-12-12 NOTE — DISCHARGE SUMMARY
Hospitalist Discharge Summary Patient ID: 
Og Casas [id number] 
89 y.o. 
1955 Admit date: 12/10/2020 10:56 AM 
Discharge date and time: 12/12/2020 Attending: Shabana Hayes MD 
PCP:  None Treatment Team: Attending Provider: Shabana Hayes MD; Utilization Review: Jostin Munoz RN 
 
Principal Diagnosis Ascites Principal Problem: 
  Ascites (12/10/2020) Active Problems: Hyponatremia (12/10/2020) Liver cirrhosis ([address] (12/10/2020) Elevated bilirubin (12/10/2020) Alcohol dependence ([facility] [address] (12/10/2020) Nicotine dependence (12/10/2020) Thrombocytopenia ([facility] [address] (12/10/2020) Diarrhea (12/10/2020) DNR (do not resuscitate) (12/10/2020) Hepatitis C antibody positive in blood (12/12/2020) Leander Burkitt a 60 y. o. male who has a PMH of chronic alcohol abuse, smoker; who came after he experienced progressive pedal edema, now up to the lower abdomen, associated with weakness, decreased appetite and weakness. The patient visited the ED ~1-2 months ago due to pedal edema, he was given a prescription of lasix but his symptoms continue to worsen. He denies chest pain, sob, fever, palpitations.  He does have ongoing watery diarrhea for 1 week. The patient is a daily alcoholic ( 6 beers daily ); his last drink was yesterday. Upon ed arrival vs were stable. 
  
Labs: cbc no leukocytosis; hb 13g; platelets: 17Q Chemistry: sodium 124; cr: 0.5 INR: 1.4 Elevated bilirubin: 4 Alt/ast: 45/106 Cxr: negative 
usg abdomen: liver cirrhosis, ascites. 
  
The patient will be admitted with a diagnosis of liver cirrhosis and new onset ascites, like related to chronic alcohol use. 
  
Interval History (12/12): patient examined at bedside. No acute overnight events. Says his swelling in his legs is improved overall. Denies any \"shakiness\" or chest pain or shortness of breath.  No anxiety or other withdrawal symptoms. He says he wants to stop drinking. No fevers/chills, chest pain, shortness of breath. Hospital Course: 
Please refer to the admission H&P for details of presentation. In summary, the patient is admitted with worsening anasarca likely due to alcoholic cirrhosis. He underwent uncomplicated diagnostic/therapeutic paracentesis and he was started on Lasix and Aldactone. He was started Ativan for alcohol withdrawal but he has not required any since admission. GI consulted with recommendations for outpatient follow-up for varices screening and long-term management of his cirrhosis. He is currently hemodynamically stable for hospital discharge, needs outpatient follow-up with his PCP and with GI. Noted that labs show positive hepatitis C antibody, will need to follow-up with GI for that as well to consider treatment. Hospitalization complicated by hypokalemia, decreased his Lasix dosing at discharge. Needs outpatient BMP within 1 week. Alcohol cessation counseling provided. Details of hospitalization discussed with patient who understands and agrees with plan of care and discharge home. Return precautions provided. All questions answered. Significant Diagnostic Studies: EXAM: US ABD LTD 
  
INDICATION: abdominal distension and lower extremity edema 
  
COMPARISON: None. 
  
TECHNIQUE:  
Real-time sonography of the right upper abdomen abdomen was performed with 
multiple static images of the liver, gallbladder, pancreas, spleen, kidneys, 
abdominal aorta and IVC obtained. 
  
FINDINGS: 
Liver: Mildly enlarged liver measuring 18.6 cm. Increased parenchymal 
echogenicity. Coarse echotexture. No flow detected in the portal vein. Ascites. 
  
Biliary: There is gallbladder sludge or fine layering gallstones. No shadowing 
gallstones demonstrated. The extrahepatic biliary duct measures 2.7 mm.  There is 
thickening of the gallbladder wall which may be pseudothickening due to the 
ascites. 
  
 Pancreas: The pancreas is normal.   
  
Right kidney: The kidney demonstrate normal echogenicity with no mass, stone or 
hydronephrosis. The right kidney measures 13.6 cm in length.  
  
Aorta/IVC: Not visualized.  
  
  
IMPRESSION IMPRESSION:  
  
Gallbladder sludge or fine nonshadowing gallstones. No evidence of biliary duct 
obstruction. . 
  
Cirrhotic morphology to the liver. Ascites. Flow in the portal vein could not be 
confirmed. 
  
 
Labs: Results:  
   
Chemistry Recent Labs 12/12/20 
1726 12/12/20 
0501 12/11/20 
0804 12/10/20 
1144 GLU  --  92 77 87 NA  --  131* 128* 124* K 3.8 2.9* 3.6 4.2 CL  --  96* 98 94* CO2  --  24 25 21 BUN  --  7* 7* 5*  
CREA  --  0.45* 0.41* 0.53* CA  --  7.8* 7.7* 8.7 AGAP  --  11 5* 9  
AP  --  74 66 80 TP  --  5.5* 5.2* 6.5 ALB  --  2.2* 1.9* 2.3*  
GLOB  --  3.3 3.3 4.2* AGRAT  --  0.7* 0.6* 0.5* CBC w/Diff Recent Labs 12/12/20 
0501 12/11/20 
0804 12/10/20 
1144 WBC 4.5 4.9 6.2  
RBC 3.02* 2.93* 3.66* HGB 11.2* 11.2* 13.4* HCT 30.1* 29.8* 36.4*  
PLT 46* 48* 56* GRANS 51 53 57 LYMPH 29 30 24 EOS 1 1 1 Cardiac Enzymes No results for input(s): CPK, CKND1, MARGO in the last 72 hours. No lab exists for component: [facility] Recent Labs 12/10/20 
1144 PTP 17.5* INR 1.4 APTT 36.4* Lipid Panel No results found for: CHOL, CHOLPOCT, CHOLX, CHLST, CHOLV, 186475, HDL, HDLP, LDL, LDLC, DLDLP, 655225, VLDLC, VLDL, TGLX, TRIGL, TRIGP, TGLPOCT, CHHD, CHHDX  
BNP No results for input(s): BNPP in the last 72 hours. Liver Enzymes Recent Labs 12/12/20 
0501 TP 5.5* ALB 2.2* AP 74 Thyroid Studies No results found for: T4, T3U, TSH, TSHEXT, TSHEXT Discharge Exam: 
Visit Vitals BP (!) 151/80 Pulse 98 Temp 97.9 °F (36.6 °C) Resp 18 Ht 6' 7\" (2.007 m) Wt 81.6 kg (180 lb) SpO2 96% BMI 20.28 kg/m² General:          No acute distress   
 Lungs:             CTA Bilaterally. Heart:              Regular rate and rhythm,  No murmur, rub, or gallop Abdomen:        Distended abdomen that is nontender to palpation, bandage to LLQ with no active bleeding Extremities:     No cyanosis, clubbing or edema Neurologic:      No focal deficits Disposition: home Discharge Condition: stable Patient Instructions:  
Current Discharge Medication List  
  
START taking these medications Details  
furosemide (LASIX) 40 mg tablet Take 1 Tab by mouth daily for 30 days. Qty: 30 Tab, Refills: 0 LORazepam (ATIVAN) 1 mg tablet Take 1 Tab by mouth every eight (8) hours as needed (alcohol withdrawal). Max Daily Amount: 3 mg. Qty: 9 Tab, Refills: 0 Associated Diagnoses: Alcoholic cirrhosis of liver with ascites (HCC)  
  
magnesium oxide (MAG-OX) 400 mg tablet Take 1 Tab by mouth daily for 30 days. Qty: 30 Tab, Refills: 0  
  
spironolactone (ALDACTONE) 25 mg tablet Take 1 Tab by mouth daily for 30 days. Qty: 30 Tab, Refills: 0  
  
thiamine HCL (B-1) 100 mg tablet Take 1 Tab by mouth daily for 30 days. Qty: 30 Tab, Refills: 0  
  
folic acid (FOLVITE) 1 mg tablet Take 1 Tab by mouth daily for 30 days. Qty: 30 Tab, Refills: 0 Activity: Activity as tolerated Diet: Cardiac Diet Wound Care: None needed Follow-up ·   With PCP within 1 week. With GI within 2-3 weeks or as previously scheduled. Time spent to discharge patient 35 minutes Signed: Ismael Brar DO 
12/12/2020 
6:55 PM

## 2020-12-12 NOTE — PROGRESS NOTES
Pt rested at intervals during night. Pt will wonder in room. Pt disoriented to time, place and situation. Pt rounded on at least hourly during shift.

## 2020-12-13 NOTE — PROGRESS NOTES
Pt discharged to home in care of his son. Pt had all of his belongings with him. An additional discharge instruction--pt instructed to establish with a primary care physician and follow up after discharge. Pt instructed to come back to ER if condition worsens.

## 2020-12-13 NOTE — PROGRESS NOTES
Problem: Risk for Spread of Infection Goal: Prevent transmission of infectious organism to others Description: Prevent the transmission of infectious organisms to other patients, staff members, and visitors. Outcome: Resolved/Met Problem: Patient Education:  Go to Education Activity Goal: Patient/Family Education Outcome: Resolved/Met Problem: Patient Education: Go to Patient Education Activity Goal: Patient/Family Education Outcome: Resolved/Met Problem: Falls - Risk of 
Goal: *Absence of Falls Description: Document Leah Sharpe Fall Risk and appropriate interventions in the flowsheet. Outcome: Resolved/Met Note: Fall Risk Interventions: 
Mobility Interventions: Communicate number of staff needed for ambulation/transfer Mentation Interventions: Door open when patient unattended Medication Interventions: Evaluate medications/consider consulting pharmacy, Patient to call before getting OOB Elimination Interventions: Toileting schedule/hourly rounds History of Falls Interventions: Bed/chair exit alarm Problem: Patient Education: Go to Patient Education Activity Goal: Patient/Family Education Outcome: Resolved/Met Problem: Pressure Injury - Risk of 
Goal: *Prevention of pressure injury Description: Document [place] Scale and appropriate interventions in the flowsheet. Outcome: Resolved/Met Note: Pressure Injury Interventions: 
Sensory Interventions: Discuss PT/OT consult with provider Moisture Interventions: Minimize layers Activity Interventions: Pressure redistribution bed/mattress(bed type) Mobility Interventions: Pressure redistribution bed/mattress (bed type) Nutrition Interventions: Document food/fluid/supplement intake Problem: Patient Education: Go to Patient Education Activity Goal: Patient/Family Education Outcome: Resolved/Met Problem: Patient Education: Go to Patient Education Activity Goal: Patient/Family Education Outcome: Resolved/Met

## 2020-12-13 NOTE — PROGRESS NOTES
Received orders for discharge. When attempting to initially review instructions with pt, he was in room smoking a cigarette. Instructed pt on no smoking in hospital.  Pt is confused to time, place, situation. Pt states that he can drive home. Pt is not safe for driving. Call placed to pt's son, Fidelia Reece. Pt's son is going to come pick him up. Reviewed discharge instuctions with pt's son on phone. Stressed importance that if pt continues to drink alcohol, he SHOULD NOT take any ativan prescribed with alcohol. Pt's son verbalized understanding. Discharge instructions reviewed with pt. Pt stated he understood. All copies signed. Flu shot refused. Pt's belonging pulled together and pt ready for discharge as soon as his son arrives. nicotene patch removed prior to discharge.

## 2020-12-13 NOTE — DISCHARGE INSTRUCTIONS
DISCHARGE SUMMARY from Nurse    PATIENT INSTRUCTIONS:    After general anesthesia or intravenous sedation, for 24 hours or while taking prescription Narcotics:  · Limit your activities  · Do not drive and operate hazardous machinery  · Do not make important personal or business decisions  · Do  not drink alcoholic beverages  · If you have not urinated within 8 hours after discharge, please contact your surgeon on call. Report the following to your surgeon:  · Excessive pain, swelling, redness or odor of or around the surgical area  · Temperature over 100.5  · Nausea and vomiting lasting longer than 4 hours or if unable to take medications  · Any signs of decreased circulation or nerve impairment to extremity: change in color, persistent  numbness, tingling, coldness or increase pain  · Any questions    What to do at Home:  Recommended activity: {discharge activity:87009}, ***    If you experience any of the following symptoms ***, please follow up with ***. *  Please give a list of your current medications to your Primary Care Provider. *  Please update this list whenever your medications are discontinued, doses are      changed, or new medications (including over-the-counter products) are added. *  Please carry medication information at all times in case of emergency situations. These are general instructions for a healthy lifestyle:    No smoking/ No tobacco products/ Avoid exposure to second hand smoke  Surgeon General's Warning:  Quitting smoking now greatly reduces serious risk to your health.     Obesity, smoking, and sedentary lifestyle greatly increases your risk for illness    A healthy diet, regular physical exercise & weight monitoring are important for maintaining a healthy lifestyle    You may be retaining fluid if you have a history of heart failure or if you experience any of the following symptoms:  Weight gain of 3 pounds or more overnight or 5 pounds in a week, increased swelling in our hands or feet or shortness of breath while lying flat in bed. Please call your doctor as soon as you notice any of these symptoms; do not wait until your next office visit. The discharge information has been reviewed with the {PATIENT PARENT GUARDIAN:34499}. The {PATIENT PARENT GUARDIAN:98825} verbalized understanding. Discharge medications reviewed with the {Dishcarge meds reviewed HYFX:95890} and appropriate educational materials and side effects teaching were provided. ___________________________________________________________________________________________________________________________________      Patient Education        Learning About Benefits From Quitting Smoking  How does quitting smoking make you healthier? If you're thinking about quitting smoking, you may have a few reasons to be smoke-free. Your health may be one of them. · When you quit smoking, you lower your risks for cancer, lung disease, heart attack, stroke, blood vessel disease, and blindness from macular degeneration. · When you're smoke-free, you get sick less often, and you heal faster. You are less likely to get colds, flu, bronchitis, and pneumonia. · As a nonsmoker, you may find that your mood is better and you are less stressed. When and how will you feel healthier? Quitting has real health benefits that start from day 1 of being smoke-free. And the longer you stay smoke-free, the healthier you get and the better you feel. The first hours  · After just 20 minutes, your blood pressure and heart rate go down. That means there's less stress on your heart and blood vessels. · Within 12 hours, the level of carbon monoxide in your blood drops back to normal. That makes room for more oxygen. With more oxygen in your body, you may notice that you have more energy than when you smoked. After 2 weeks  · Your lungs start to work better. · Your risk of heart attack starts to drop.   After 1 month  · When your lungs are clear, you cough less and breathe deeper, so it's easier to be active. · Your sense of taste and smell return. That means you can enjoy food more than you have since you started smoking. Over the years  · Over the years, your risks of heart disease, heart attack, and stroke are lower. · After 10 years, your risk of dying from lung cancer is cut by about half. And your risk for many other types of cancer is lower too. How would quitting help others in your life? When you quit smoking, you improve the health of everyone who now breathes in your smoke. · Their heart, lung, and cancer risks drop, much like yours. · They are sick less. For babies and small children, living smoke-free means they're less likely to have ear infections, pneumonia, and bronchitis. · If you're a woman who is or will be pregnant someday, quitting smoking means a healthier . · Children who are close to you are less likely to become adult smokers. Where can you learn more? Go to http://www.gray.com/  Enter O319 in the search box to learn more about \"Learning About Benefits From Quitting Smoking. \"  Current as of: 2020               Content Version: 12.6  © 2853-0170 [employer]. Care instructions adapted under license by [employer] (which disclaims liability or warranty for this information). If you have questions about a medical condition or this instruction, always ask your healthcare professional. [place] [address] any warranty or liability for your use of this information.

## 2020-12-23 NOTE — DISCHARGE INSTRUCTIONS
Go directly to Dr. Tabatha Kelly office at [address] [place] [address],[address].  He will meet you at 11:15 PM.  His phone number is [phone].

## 2020-12-23 NOTE — ED PROVIDER NOTES
Patient has a history of cirrhosis, was admitted here recently and discharged 5 days ago for his cirrhosis. He presents with his son. The patient states he has had left eye redness and pain since he was discharged 5 days ago which has been getting progressively worse. He states his vision is getting worse in his left eye. He denies any problems with his right eye. He denies similar symptoms in the past.  He denies any trauma, denies any fever. He has not taken any medicine for his symptoms. Past Medical History:  
Diagnosis Date  Cirrhosis ([facility] [address] Past Surgical History:  
Procedure Laterality Date  IR PARACENTESIS ABD W IMAGE  12/11/2020 History reviewed. No pertinent family history. Social History Socioeconomic History  Marital status:  Spouse name: Not on file  Number of children: Not on file  Years of education: Not on file  Highest education level: Not on file Occupational History  Not on file Social Needs  Financial resource strain: Not on file  Food insecurity Worry: Not on file Inability: Not on file  Transportation needs Medical: Not on file Non-medical: Not on file Tobacco Use  Smoking status: Not on file Substance and Sexual Activity  Alcohol use: Not on file  Drug use: Not on file  Sexual activity: Not on file Lifestyle  Physical activity Days per week: Not on file Minutes per session: Not on file  Stress: Not on file Relationships  Social connections Talks on phone: Not on file Gets together: Not on file Attends Mandaen service: Not on file Active member of club or organization: Not on file Attends meetings of clubs or organizations: Not on file Relationship status: Not on file  Intimate partner violence Fear of current or ex partner: Not on file Emotionally abused: Not on file Physically abused: Not on file Forced sexual activity: Not on file Other Topics Concern  Not on file Social History Narrative  Not on file ALLERGIES: Patient has no known allergies. Review of Systems Constitutional: Negative for chills and fever. Eyes: Positive for pain and redness. Gastrointestinal: Negative for nausea and vomiting. All other systems reviewed and are negative. Vitals:  
 12/22/20 2040 BP: 99/67 Pulse: 90 Resp: 18 Temp: 98.1 °F (36.7 °C) SpO2: 99% Weight: 81.6 kg (180 lb) Height: 6' 7\" (2.007 m) Physical Exam 
Vitals signs and nursing note reviewed. Constitutional:   
   Appearance: He is well-developed and normal weight. Comments: Disheveled elderly white male who appears older than his stated age HENT:  
   Head: Normocephalic and atraumatic. Eyes:  
   General:     
   Right eye: No discharge. Left eye: No discharge. Extraocular Movements: Extraocular movements intact. Comments: Normal right eye exam.  Left eye shows hypopyon with cloudy cornea. Unable to evaluate pupillary response in his left eye. He also has significant scleral injection. He is able to distinguish how many fingers I am holding up at arms length with his right eye, unable to see my hand with his left eye Neck: Musculoskeletal: Normal range of motion and neck supple. Pulmonary:  
   Effort: Pulmonary effort is normal. No respiratory distress. Musculoskeletal: Normal range of motion. Skin: 
   General: Skin is warm and dry. Neurological:  
   Mental Status: He is alert and oriented to person, place, and time. 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 Macarena Carrera, discussed details of case. He wants to see the patient in his office immediately for further evaluation. 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 Discussed this with patient and his son, need to go directly to the ophthalmology office to be seen. Amount and/or Complexity of Data Reviewed Discuss the patient with other providers: yes Risk of Complications, Morbidity, and/or Mortality Presenting problems: high Diagnostic procedures: moderate Management options: moderate Patient Progress Patient progress: stable Procedures

## 2020-12-23 NOTE — ED NOTES
I have reviewed discharge instructions with the patient. The patient verbalized understanding. Patient left ED via Discharge Method: ambulatory to Home with self. Opportunity for questions and clarification provided. Patient given 0 scripts. To continue your aftercare when you leave the hospital, you may receive an automated call from our care team to check in on how you are doing. This is a free service and part of our promise to provide the best care and service to meet your aftercare needs.  If you have questions, or wish to unsubscribe from this service please call [phone]. Thank you for Choosing our [facility] Emergency Department.

## 2020-12-23 NOTE — ED TRIAGE NOTES
Pt reports 3 days of left eye drainage, redness. Denies itching or photophobia. States blurred vision. Denies injury. Iris appears discolored white.

## 2021-01-01 ENCOUNTER — APPOINTMENT (OUTPATIENT)
Dept: GENERAL RADIOLOGY | Age: 66
DRG: 870 | End: 2021-01-01
Attending: INTERNAL MEDICINE

## 2021-01-01 ENCOUNTER — ANESTHESIA EVENT (OUTPATIENT)
Dept: ENDOSCOPY | Age: 66
DRG: 870 | End: 2021-01-01

## 2021-01-01 ENCOUNTER — APPOINTMENT (OUTPATIENT)
Dept: GENERAL RADIOLOGY | Age: 66
DRG: 870 | End: 2021-01-01
Attending: EMERGENCY MEDICINE

## 2021-01-01 ENCOUNTER — APPOINTMENT (OUTPATIENT)
Dept: GENERAL RADIOLOGY | Age: 66
DRG: 870 | End: 2021-01-01
Attending: ANESTHESIOLOGY

## 2021-01-01 ENCOUNTER — HOSPITAL ENCOUNTER (INPATIENT)
Age: 66
LOS: 9 days | DRG: 870 | End: 2021-02-15
Attending: EMERGENCY MEDICINE | Admitting: HOSPITALIST

## 2021-01-01 ENCOUNTER — ANESTHESIA (OUTPATIENT)
Dept: ENDOSCOPY | Age: 66
DRG: 870 | End: 2021-01-01

## 2021-01-01 VITALS
TEMPERATURE: 95.9 F | SYSTOLIC BLOOD PRESSURE: 88 MMHG | BODY MASS INDEX: 29.79 KG/M2 | HEIGHT: 78 IN | DIASTOLIC BLOOD PRESSURE: 50 MMHG | OXYGEN SATURATION: 5 % | WEIGHT: 257.5 LBS

## 2021-01-01 DIAGNOSIS — E43 SEVERE PROTEIN-CALORIE MALNUTRITION (HCC): ICD-10-CM

## 2021-01-01 DIAGNOSIS — K92.0 HEMATEMESIS WITH NAUSEA: ICD-10-CM

## 2021-01-01 DIAGNOSIS — Z99.11 ENCOUNTER FOR WEANING FROM VENTILATOR (HCC): ICD-10-CM

## 2021-01-01 DIAGNOSIS — K72.91: ICD-10-CM

## 2021-01-01 DIAGNOSIS — R57.8 HEMORRHAGIC SHOCK (HCC): ICD-10-CM

## 2021-01-01 DIAGNOSIS — Z51.5 END OF LIFE CARE: ICD-10-CM

## 2021-01-01 DIAGNOSIS — Z71.89 ADVANCED CARE PLANNING/COUNSELING DISCUSSION: ICD-10-CM

## 2021-01-01 DIAGNOSIS — U07.1 ENCEPHALOPATHY DUE TO COVID-19 VIRUS: ICD-10-CM

## 2021-01-01 DIAGNOSIS — G93.40 ENCEPHALOPATHY: ICD-10-CM

## 2021-01-01 DIAGNOSIS — K70.31 ASCITES DUE TO ALCOHOLIC CIRRHOSIS (HCC): ICD-10-CM

## 2021-01-01 DIAGNOSIS — R76.8 HEPATITIS C ANTIBODY POSITIVE IN BLOOD: ICD-10-CM

## 2021-01-01 DIAGNOSIS — R54 FRAILTY: ICD-10-CM

## 2021-01-01 DIAGNOSIS — R40.3 PERSISTENT VEGETATIVE STATE (HCC): ICD-10-CM

## 2021-01-01 DIAGNOSIS — K22.89 ESOPHAGEAL BLEEDING: ICD-10-CM

## 2021-01-01 DIAGNOSIS — K92.0 COFFEE GROUND EMESIS: Primary | ICD-10-CM

## 2021-01-01 DIAGNOSIS — Z51.5 ENCOUNTER FOR PALLIATIVE CARE: ICD-10-CM

## 2021-01-01 DIAGNOSIS — R17 ELEVATED BILIRUBIN: ICD-10-CM

## 2021-01-01 DIAGNOSIS — J96.01 ACUTE RESPIRATORY FAILURE WITH HYPOXIA (HCC): ICD-10-CM

## 2021-01-01 DIAGNOSIS — K70.31 ALCOHOLIC CIRRHOSIS OF LIVER WITH ASCITES (HCC): ICD-10-CM

## 2021-01-01 DIAGNOSIS — D69.6 THROMBOCYTOPENIA (HCC): ICD-10-CM

## 2021-01-01 DIAGNOSIS — G40.909 SEIZURE CEREBRAL (HCC): ICD-10-CM

## 2021-01-01 DIAGNOSIS — D64.9 ANEMIA, UNSPECIFIED TYPE: ICD-10-CM

## 2021-01-01 DIAGNOSIS — K92.2 UGIB (UPPER GASTROINTESTINAL BLEED): ICD-10-CM

## 2021-01-01 DIAGNOSIS — G93.49 ENCEPHALOPATHY DUE TO COVID-19 VIRUS: ICD-10-CM

## 2021-01-01 DIAGNOSIS — D64.9 SYMPTOMATIC ANEMIA: ICD-10-CM

## 2021-01-01 DIAGNOSIS — F10.20 UNCOMPLICATED ALCOHOL DEPENDENCE (HCC): ICD-10-CM

## 2021-01-01 LAB
ABO + RH BLD: NORMAL
ALBUMIN SERPL-MCNC: 1.7 G/DL (ref 3.2–4.6)
ALBUMIN SERPL-MCNC: 1.7 G/DL (ref 3.2–4.6)
ALBUMIN SERPL-MCNC: 2.1 G/DL (ref 3.2–4.6)
ALBUMIN SERPL-MCNC: 2.1 G/DL (ref 3.2–4.6)
ALBUMIN SERPL-MCNC: 2.2 G/DL (ref 3.2–4.6)
ALBUMIN SERPL-MCNC: 2.5 G/DL (ref 3.2–4.6)
ALBUMIN SERPL-MCNC: 2.5 G/DL (ref 3.2–4.6)
ALBUMIN SERPL-MCNC: 2.6 G/DL (ref 3.2–4.6)
ALBUMIN SERPL-MCNC: 2.8 G/DL (ref 3.2–4.6)
ALBUMIN/GLOB SERPL: 0.5 {RATIO} (ref 1.2–3.5)
ALBUMIN/GLOB SERPL: 0.6 {RATIO} (ref 1.2–3.5)
ALBUMIN/GLOB SERPL: 0.9 {RATIO} (ref 1.2–3.5)
ALBUMIN/GLOB SERPL: 1 {RATIO} (ref 1.2–3.5)
ALBUMIN/GLOB SERPL: 1 {RATIO} (ref 1.2–3.5)
ALBUMIN/GLOB SERPL: 1.1 {RATIO} (ref 1.2–3.5)
ALBUMIN/GLOB SERPL: 1.2 {RATIO} (ref 1.2–3.5)
ALBUMIN/GLOB SERPL: 1.3 {RATIO} (ref 1.2–3.5)
ALBUMIN/GLOB SERPL: 1.3 {RATIO} (ref 1.2–3.5)
ALP SERPL-CCNC: 104 U/L (ref 50–136)
ALP SERPL-CCNC: 105 U/L (ref 50–136)
ALP SERPL-CCNC: 41 U/L (ref 50–136)
ALP SERPL-CCNC: 41 U/L (ref 50–136)
ALP SERPL-CCNC: 55 U/L (ref 50–136)
ALP SERPL-CCNC: 56 U/L (ref 50–136)
ALP SERPL-CCNC: 57 U/L (ref 50–136)
ALP SERPL-CCNC: 65 U/L (ref 50–136)
ALP SERPL-CCNC: 79 U/L (ref 50–136)
ALT SERPL-CCNC: 12 U/L (ref 12–65)
ALT SERPL-CCNC: 13 U/L (ref 12–65)
ALT SERPL-CCNC: 18 U/L (ref 12–65)
ALT SERPL-CCNC: 19 U/L (ref 12–65)
ALT SERPL-CCNC: 20 U/L (ref 12–65)
ALT SERPL-CCNC: 20 U/L (ref 12–65)
ALT SERPL-CCNC: 22 U/L (ref 12–65)
ALT SERPL-CCNC: 23 U/L (ref 12–65)
ALT SERPL-CCNC: 24 U/L (ref 12–65)
AMMONIA PLAS-SCNC: 101 UMOL/L (ref 11–32)
AMMONIA PLAS-SCNC: 55 UMOL/L (ref 11–32)
ANION GAP SERPL CALC-SCNC: 10 MMOL/L (ref 7–16)
ANION GAP SERPL CALC-SCNC: 3 MMOL/L (ref 7–16)
ANION GAP SERPL CALC-SCNC: 3 MMOL/L (ref 7–16)
ANION GAP SERPL CALC-SCNC: 4 MMOL/L (ref 7–16)
ANION GAP SERPL CALC-SCNC: 5 MMOL/L (ref 7–16)
ANION GAP SERPL CALC-SCNC: 7 MMOL/L (ref 7–16)
ANION GAP SERPL CALC-SCNC: 7 MMOL/L (ref 7–16)
ANION GAP SERPL CALC-SCNC: 8 MMOL/L (ref 7–16)
APPEARANCE UR: ABNORMAL
APTT PPP: 27.4 SEC (ref 24.1–35.1)
APTT PPP: 49.1 SEC (ref 24.1–35.1)
ARTERIAL PATENCY WRIST A: ABNORMAL
ARTERIAL PATENCY WRIST A: YES
ARTERIAL PATENCY WRIST A: YES
AST SERPL-CCNC: 31 U/L (ref 15–37)
AST SERPL-CCNC: 37 U/L (ref 15–37)
AST SERPL-CCNC: 43 U/L (ref 15–37)
AST SERPL-CCNC: 46 U/L (ref 15–37)
AST SERPL-CCNC: 56 U/L (ref 15–37)
AST SERPL-CCNC: 61 U/L (ref 15–37)
AST SERPL-CCNC: 62 U/L (ref 15–37)
AST SERPL-CCNC: 64 U/L (ref 15–37)
AST SERPL-CCNC: 65 U/L (ref 15–37)
ATRIAL RATE: 220 BPM
BACTERIA SPEC CULT: NORMAL
BACTERIA SPEC CULT: NORMAL
BACTERIA URNS QL MICRO: ABNORMAL /HPF
BASE DEFICIT BLD-SCNC: 1 MMOL/L
BASE DEFICIT BLD-SCNC: 1 MMOL/L
BASE DEFICIT BLD-SCNC: 3 MMOL/L
BASE DEFICIT BLD-SCNC: 4 MMOL/L
BASE DEFICIT BLD-SCNC: 5 MMOL/L
BASE DEFICIT BLD-SCNC: 6 MMOL/L
BASE EXCESS BLD CALC-SCNC: 1 MMOL/L
BASE EXCESS BLD CALC-SCNC: 3 MMOL/L
BASE EXCESS BLD CALC-SCNC: 6 MMOL/L
BASOPHILS # BLD: 0 K/UL (ref 0–0.2)
BASOPHILS # BLD: 0 K/UL (ref 0–0.2)
BASOPHILS NFR BLD: 0 % (ref 0–2)
BASOPHILS NFR BLD: 0 % (ref 0–2)
BDY SITE: ABNORMAL
BILIRUB DIRECT SERPL-MCNC: 3.4 MG/DL
BILIRUB DIRECT SERPL-MCNC: 3.5 MG/DL
BILIRUB DIRECT SERPL-MCNC: 3.6 MG/DL
BILIRUB DIRECT SERPL-MCNC: 3.7 MG/DL
BILIRUB DIRECT SERPL-MCNC: 4.7 MG/DL
BILIRUB DIRECT SERPL-MCNC: 6.6 MG/DL
BILIRUB DIRECT SERPL-MCNC: 7.7 MG/DL
BILIRUB SERPL-MCNC: 1.7 MG/DL (ref 0.2–1.1)
BILIRUB SERPL-MCNC: 10 MG/DL (ref 0.2–1.1)
BILIRUB SERPL-MCNC: 4.2 MG/DL (ref 0.2–1.1)
BILIRUB SERPL-MCNC: 5.3 MG/DL (ref 0.2–1.1)
BILIRUB SERPL-MCNC: 5.4 MG/DL (ref 0.2–1.1)
BILIRUB SERPL-MCNC: 6.8 MG/DL (ref 0.2–1.1)
BILIRUB SERPL-MCNC: 8.8 MG/DL (ref 0.2–1.1)
BILIRUB UR QL: ABNORMAL
BLD PROD TYP BPU: NORMAL
BLOOD BANK CMNT PATIENT-IMP: NORMAL
BLOOD GROUP ANTIBODIES SERPL: NORMAL
BPU ID: NORMAL
BUN SERPL-MCNC: 21 MG/DL (ref 8–23)
BUN SERPL-MCNC: 25 MG/DL (ref 8–23)
BUN SERPL-MCNC: 27 MG/DL (ref 8–23)
BUN SERPL-MCNC: 30 MG/DL (ref 8–23)
BUN SERPL-MCNC: 31 MG/DL (ref 8–23)
BUN SERPL-MCNC: 31 MG/DL (ref 8–23)
BUN SERPL-MCNC: 33 MG/DL (ref 8–23)
BUN SERPL-MCNC: 34 MG/DL (ref 8–23)
BUN SERPL-MCNC: 41 MG/DL (ref 8–23)
BUN SERPL-MCNC: 41 MG/DL (ref 8–23)
CA-I BLD-MCNC: 4.56 MG/DL (ref 4–5.2)
CALCIUM SERPL-MCNC: 7.7 MG/DL (ref 8.3–10.4)
CALCIUM SERPL-MCNC: 7.7 MG/DL (ref 8.3–10.4)
CALCIUM SERPL-MCNC: 7.8 MG/DL (ref 8.3–10.4)
CALCIUM SERPL-MCNC: 8.1 MG/DL (ref 8.3–10.4)
CALCIUM SERPL-MCNC: 8.1 MG/DL (ref 8.3–10.4)
CALCIUM SERPL-MCNC: 8.2 MG/DL (ref 8.3–10.4)
CALCIUM SERPL-MCNC: 8.2 MG/DL (ref 8.3–10.4)
CALCIUM SERPL-MCNC: 8.3 MG/DL (ref 8.3–10.4)
CALCULATED R AXIS, ECG10: 52 DEGREES
CALCULATED T AXIS, ECG11: 82 DEGREES
CASTS URNS QL MICRO: ABNORMAL /LPF
CHLORIDE SERPL-SCNC: 100 MMOL/L (ref 98–107)
CHLORIDE SERPL-SCNC: 102 MMOL/L (ref 98–107)
CHLORIDE SERPL-SCNC: 106 MMOL/L (ref 98–107)
CHLORIDE SERPL-SCNC: 108 MMOL/L (ref 98–107)
CHLORIDE SERPL-SCNC: 109 MMOL/L (ref 98–107)
CHLORIDE SERPL-SCNC: 110 MMOL/L (ref 98–107)
CHLORIDE SERPL-SCNC: 110 MMOL/L (ref 98–107)
CHLORIDE SERPL-SCNC: 111 MMOL/L (ref 98–107)
CHLORIDE SERPL-SCNC: 111 MMOL/L (ref 98–107)
CHLORIDE SERPL-SCNC: 98 MMOL/L (ref 98–107)
CK SERPL-CCNC: 87 U/L (ref 21–215)
CO2 BLD-SCNC: 21 MMOL/L
CO2 BLD-SCNC: 22 MMOL/L
CO2 BLD-SCNC: 23 MMOL/L
CO2 BLD-SCNC: 23 MMOL/L
CO2 BLD-SCNC: 24 MMOL/L
CO2 BLD-SCNC: 25 MMOL/L
CO2 BLD-SCNC: 25 MMOL/L
CO2 BLD-SCNC: 26 MMOL/L
CO2 BLD-SCNC: 28 MMOL/L
CO2 BLD-SCNC: 28 MMOL/L
CO2 BLD-SCNC: 31 MMOL/L
CO2 SERPL-SCNC: 23 MMOL/L (ref 21–32)
CO2 SERPL-SCNC: 24 MMOL/L (ref 21–32)
CO2 SERPL-SCNC: 26 MMOL/L (ref 21–32)
CO2 SERPL-SCNC: 26 MMOL/L (ref 21–32)
CO2 SERPL-SCNC: 27 MMOL/L (ref 21–32)
CO2 SERPL-SCNC: 28 MMOL/L (ref 21–32)
CO2 SERPL-SCNC: 29 MMOL/L (ref 21–32)
CO2 SERPL-SCNC: 31 MMOL/L (ref 21–32)
COLLECT TIME,HTIME: 1110
COLLECT TIME,HTIME: 158
COLLECT TIME,HTIME: 2035
COLLECT TIME,HTIME: 205
COLLECT TIME,HTIME: 221
COLLECT TIME,HTIME: 258
COLLECT TIME,HTIME: 319
COLLECT TIME,HTIME: 322
COLLECT TIME,HTIME: 324
COLLECT TIME,HTIME: 434
COLLECT TIME,HTIME: 530
COLOR UR: ABNORMAL
CREAT SERPL-MCNC: 0.74 MG/DL (ref 0.8–1.5)
CREAT SERPL-MCNC: 0.78 MG/DL (ref 0.8–1.5)
CREAT SERPL-MCNC: 0.83 MG/DL (ref 0.8–1.5)
CREAT SERPL-MCNC: 0.88 MG/DL (ref 0.8–1.5)
CREAT SERPL-MCNC: 0.9 MG/DL (ref 0.8–1.5)
CREAT SERPL-MCNC: 0.96 MG/DL (ref 0.8–1.5)
CREAT SERPL-MCNC: 1.1 MG/DL (ref 0.8–1.5)
CREAT SERPL-MCNC: 1.16 MG/DL (ref 0.8–1.5)
CREAT SERPL-MCNC: 1.24 MG/DL (ref 0.8–1.5)
CREAT SERPL-MCNC: 1.24 MG/DL (ref 0.8–1.5)
CROSSMATCH RESULT,%XM: NORMAL
DIAGNOSIS, 93000: NORMAL
DIFFERENTIAL METHOD BLD: ABNORMAL
DIFFERENTIAL METHOD BLD: ABNORMAL
EOSINOPHIL # BLD: 0 K/UL (ref 0–0.8)
EOSINOPHIL # BLD: 0 K/UL (ref 0–0.8)
EOSINOPHIL NFR BLD: 0 % (ref 0.5–7.8)
EOSINOPHIL NFR BLD: 0 % (ref 0.5–7.8)
ERYTHROCYTE [DISTWIDTH] IN BLOOD BY AUTOMATED COUNT: 13.5 % (ref 11.9–14.6)
ERYTHROCYTE [DISTWIDTH] IN BLOOD BY AUTOMATED COUNT: 15.7 % (ref 11.9–14.6)
ERYTHROCYTE [DISTWIDTH] IN BLOOD BY AUTOMATED COUNT: 16 % (ref 11.9–14.6)
ERYTHROCYTE [DISTWIDTH] IN BLOOD BY AUTOMATED COUNT: 16 % (ref 11.9–14.6)
ERYTHROCYTE [DISTWIDTH] IN BLOOD BY AUTOMATED COUNT: 16.3 % (ref 11.9–14.6)
ERYTHROCYTE [DISTWIDTH] IN BLOOD BY AUTOMATED COUNT: 16.6 % (ref 11.9–14.6)
ERYTHROCYTE [DISTWIDTH] IN BLOOD BY AUTOMATED COUNT: 16.7 % (ref 11.9–14.6)
ERYTHROCYTE [DISTWIDTH] IN BLOOD BY AUTOMATED COUNT: 16.8 % (ref 11.9–14.6)
ERYTHROCYTE [DISTWIDTH] IN BLOOD BY AUTOMATED COUNT: 17.4 % (ref 11.9–14.6)
ERYTHROCYTE [DISTWIDTH] IN BLOOD BY AUTOMATED COUNT: 17.7 % (ref 11.9–14.6)
ETHANOL SERPL-MCNC: <3 MG/DL
EXHALED MINUTE VOLUME, VE: 11.4 L/MIN
EXHALED MINUTE VOLUME, VE: 11.4 L/MIN
EXHALED MINUTE VOLUME, VE: 12.3 L/MIN
EXHALED MINUTE VOLUME, VE: 12.7 L/MIN
EXHALED MINUTE VOLUME, VE: 12.7 L/MIN
EXHALED MINUTE VOLUME, VE: 14.6 L/MIN
EXHALED MINUTE VOLUME, VE: 15.3 L/MIN
EXHALED MINUTE VOLUME, VE: 7.27 L/MIN
EXHALED MINUTE VOLUME, VE: 8.8 L/MIN
EXHALED MINUTE VOLUME, VE: 9.42 L/MIN
FIBRINOGEN PPP-MCNC: 171 MG/DL (ref 190–501)
FLOW RATE ISTAT,IFRATE: 60 L/MIN
FOLATE SERPL-MCNC: 7.1 NG/ML (ref 3.1–17.5)
GAS FLOW.O2 O2 DELIVERY SYS: ABNORMAL L/MIN
GAS FLOW.O2 SETTING OXYMISER: 18 BPM
GAS FLOW.O2 SETTING OXYMISER: 20 BPM
GAS FLOW.O2 SETTING OXYMISER: 22 BPM
GAS FLOW.O2 SETTING OXYMISER: 22 BPM
GAS FLOW.O2 SETTING OXYMISER: 24 BPM
GAS FLOW.O2 SETTING OXYMISER: 24 BPM
GLOBULIN SER CALC-MCNC: 2 G/DL (ref 2.3–3.5)
GLOBULIN SER CALC-MCNC: 2.1 G/DL (ref 2.3–3.5)
GLOBULIN SER CALC-MCNC: 2.1 G/DL (ref 2.3–3.5)
GLOBULIN SER CALC-MCNC: 2.2 G/DL (ref 2.3–3.5)
GLOBULIN SER CALC-MCNC: 2.4 G/DL (ref 2.3–3.5)
GLOBULIN SER CALC-MCNC: 2.8 G/DL (ref 2.3–3.5)
GLOBULIN SER CALC-MCNC: 3.3 G/DL (ref 2.3–3.5)
GLUCOSE BLD STRIP.AUTO-MCNC: 112 MG/DL (ref 65–100)
GLUCOSE BLD STRIP.AUTO-MCNC: 117 MG/DL (ref 65–100)
GLUCOSE BLD STRIP.AUTO-MCNC: 125 MG/DL (ref 65–100)
GLUCOSE BLD STRIP.AUTO-MCNC: 133 MG/DL (ref 65–100)
GLUCOSE BLD STRIP.AUTO-MCNC: 134 MG/DL (ref 65–100)
GLUCOSE BLD STRIP.AUTO-MCNC: 143 MG/DL (ref 65–100)
GLUCOSE BLD STRIP.AUTO-MCNC: 163 MG/DL (ref 65–100)
GLUCOSE BLD STRIP.AUTO-MCNC: 214 MG/DL (ref 65–100)
GLUCOSE BLD STRIP.AUTO-MCNC: 83 MG/DL (ref 65–100)
GLUCOSE BLD STRIP.AUTO-MCNC: 96 MG/DL (ref 65–100)
GLUCOSE SERPL-MCNC: 114 MG/DL (ref 65–100)
GLUCOSE SERPL-MCNC: 136 MG/DL (ref 65–100)
GLUCOSE SERPL-MCNC: 139 MG/DL (ref 65–100)
GLUCOSE SERPL-MCNC: 142 MG/DL (ref 65–100)
GLUCOSE SERPL-MCNC: 156 MG/DL (ref 65–100)
GLUCOSE SERPL-MCNC: 220 MG/DL (ref 65–100)
GLUCOSE SERPL-MCNC: 66 MG/DL (ref 65–100)
GLUCOSE SERPL-MCNC: 69 MG/DL (ref 65–100)
GLUCOSE SERPL-MCNC: 70 MG/DL (ref 65–100)
GLUCOSE SERPL-MCNC: 88 MG/DL (ref 65–100)
GLUCOSE UR STRIP.AUTO-MCNC: NEGATIVE MG/DL
HCO3 BLD-SCNC: 20.2 MMOL/L (ref 22–26)
HCO3 BLD-SCNC: 21.2 MMOL/L (ref 22–26)
HCO3 BLD-SCNC: 21.9 MMOL/L (ref 22–26)
HCO3 BLD-SCNC: 22.2 MMOL/L (ref 22–26)
HCO3 BLD-SCNC: 23 MMOL/L (ref 22–26)
HCO3 BLD-SCNC: 23.9 MMOL/L (ref 22–26)
HCO3 BLD-SCNC: 24.1 MMOL/L (ref 22–26)
HCO3 BLD-SCNC: 24.4 MMOL/L (ref 22–26)
HCO3 BLD-SCNC: 26.7 MMOL/L (ref 22–26)
HCO3 BLD-SCNC: 26.8 MMOL/L (ref 22–26)
HCO3 BLD-SCNC: 29.9 MMOL/L (ref 22–26)
HCT VFR BLD AUTO: 26.3 % (ref 41.1–50.3)
HCT VFR BLD AUTO: 30.9 % (ref 41.1–50.3)
HCT VFR BLD AUTO: 31.7 % (ref 41.1–50.3)
HCT VFR BLD AUTO: 31.8 % (ref 41.1–50.3)
HCT VFR BLD AUTO: 32.1 % (ref 41.1–50.3)
HCT VFR BLD AUTO: 32.7 % (ref 41.1–50.3)
HCT VFR BLD AUTO: 32.8 % (ref 41.1–50.3)
HCT VFR BLD AUTO: 33.1 % (ref 41.1–50.3)
HCT VFR BLD AUTO: 33.2 % (ref 41.1–50.3)
HCT VFR BLD AUTO: 33.7 % (ref 41.1–50.3)
HCT VFR BLD AUTO: 34.4 % (ref 41.1–50.3)
HCT VFR BLD AUTO: 35.1 % (ref 41.1–50.3)
HGB BLD-MCNC: 10.3 G/DL (ref 13.6–17.2)
HGB BLD-MCNC: 10.4 G/DL (ref 13.6–17.2)
HGB BLD-MCNC: 10.5 G/DL (ref 13.6–17.2)
HGB BLD-MCNC: 10.6 G/DL (ref 13.6–17.2)
HGB BLD-MCNC: 10.6 G/DL (ref 13.6–17.2)
HGB BLD-MCNC: 10.7 G/DL (ref 13.6–17.2)
HGB BLD-MCNC: 11 G/DL (ref 13.6–17.2)
HGB BLD-MCNC: 11.1 G/DL (ref 13.6–17.2)
HGB BLD-MCNC: 11.1 G/DL (ref 13.6–17.2)
HGB BLD-MCNC: 11.2 G/DL (ref 13.6–17.2)
HGB BLD-MCNC: 11.3 G/DL (ref 13.6–17.2)
HGB BLD-MCNC: 11.3 G/DL (ref 13.6–17.2)
HGB BLD-MCNC: 11.4 G/DL (ref 13.6–17.2)
HGB BLD-MCNC: 11.5 G/DL (ref 13.6–17.2)
HGB BLD-MCNC: 8.7 G/DL (ref 13.6–17.2)
HGB UR QL STRIP: NEGATIVE
HISTORY CHECKED?,CKHIST: NORMAL
IMM GRANULOCYTES # BLD AUTO: 0.1 K/UL (ref 0–0.5)
IMM GRANULOCYTES # BLD AUTO: 0.2 K/UL (ref 0–0.5)
IMM GRANULOCYTES NFR BLD AUTO: 1 % (ref 0–5)
IMM GRANULOCYTES NFR BLD AUTO: 1 % (ref 0–5)
INR PPP: 1.4
INR PPP: 1.8
INR PPP: 1.9
INR PPP: 2.1
INR PPP: 2.1
INR PPP: 2.2
INR PPP: 2.3
INSPIRATION.DURATION SETTING TIME VENT: 0.9 SEC
INSPIRATION.DURATION SETTING TIME VENT: 1 SEC
IRON SERPL-MCNC: 90 UG/DL (ref 35–150)
KETONES UR QL STRIP.AUTO: ABNORMAL MG/DL
LEUKOCYTE ESTERASE UR QL STRIP.AUTO: ABNORMAL
LIPASE SERPL-CCNC: 72 U/L (ref 73–393)
LYMPHOCYTES # BLD: 1.5 K/UL (ref 0.5–4.6)
LYMPHOCYTES # BLD: 1.7 K/UL (ref 0.5–4.6)
LYMPHOCYTES NFR BLD: 10 % (ref 13–44)
LYMPHOCYTES NFR BLD: 10 % (ref 13–44)
MAGNESIUM SERPL-MCNC: 1.9 MG/DL (ref 1.8–2.4)
MAGNESIUM SERPL-MCNC: 2.1 MG/DL (ref 1.8–2.4)
MAGNESIUM SERPL-MCNC: 2.2 MG/DL (ref 1.8–2.4)
MAGNESIUM SERPL-MCNC: 2.2 MG/DL (ref 1.8–2.4)
MAGNESIUM SERPL-MCNC: 2.3 MG/DL (ref 1.8–2.4)
MCH RBC QN AUTO: 32.4 PG (ref 26.1–32.9)
MCH RBC QN AUTO: 32.7 PG (ref 26.1–32.9)
MCH RBC QN AUTO: 32.7 PG (ref 26.1–32.9)
MCH RBC QN AUTO: 32.8 PG (ref 26.1–32.9)
MCH RBC QN AUTO: 32.9 PG (ref 26.1–32.9)
MCH RBC QN AUTO: 33.1 PG (ref 26.1–32.9)
MCH RBC QN AUTO: 33.2 PG (ref 26.1–32.9)
MCH RBC QN AUTO: 33.5 PG (ref 26.1–32.9)
MCH RBC QN AUTO: 33.6 PG (ref 26.1–32.9)
MCH RBC QN AUTO: 34 PG (ref 26.1–32.9)
MCH RBC QN AUTO: 34.1 PG (ref 26.1–32.9)
MCH RBC QN AUTO: 36.7 PG (ref 26.1–32.9)
MCHC RBC AUTO-ENTMCNC: 31.8 G/DL (ref 31.4–35)
MCHC RBC AUTO-ENTMCNC: 31.9 G/DL (ref 31.4–35)
MCHC RBC AUTO-ENTMCNC: 32.5 G/DL (ref 31.4–35)
MCHC RBC AUTO-ENTMCNC: 32.7 G/DL (ref 31.4–35)
MCHC RBC AUTO-ENTMCNC: 32.8 G/DL (ref 31.4–35)
MCHC RBC AUTO-ENTMCNC: 33.1 G/DL (ref 31.4–35)
MCHC RBC AUTO-ENTMCNC: 33.3 G/DL (ref 31.4–35)
MCHC RBC AUTO-ENTMCNC: 33.4 G/DL (ref 31.4–35)
MCHC RBC AUTO-ENTMCNC: 33.5 G/DL (ref 31.4–35)
MCHC RBC AUTO-ENTMCNC: 33.5 G/DL (ref 31.4–35)
MCHC RBC AUTO-ENTMCNC: 33.6 G/DL (ref 31.4–35)
MCHC RBC AUTO-ENTMCNC: 33.8 G/DL (ref 31.4–35)
MCV RBC AUTO: 100.7 FL (ref 79.6–97.8)
MCV RBC AUTO: 101.3 FL (ref 79.6–97.8)
MCV RBC AUTO: 101.4 FL (ref 79.6–97.8)
MCV RBC AUTO: 101.6 FL (ref 79.6–97.8)
MCV RBC AUTO: 101.9 FL (ref 79.6–97.8)
MCV RBC AUTO: 103.6 FL (ref 79.6–97.8)
MCV RBC AUTO: 103.8 FL (ref 79.6–97.8)
MCV RBC AUTO: 111 FL (ref 79.6–97.8)
MCV RBC AUTO: 97.4 FL (ref 79.6–97.8)
MCV RBC AUTO: 97.8 FL (ref 79.6–97.8)
MCV RBC AUTO: 97.9 FL (ref 79.6–97.8)
MCV RBC AUTO: 99.1 FL (ref 79.6–97.8)
MONOCYTES # BLD: 1.2 K/UL (ref 0.1–1.3)
MONOCYTES # BLD: 1.3 K/UL (ref 0.1–1.3)
MONOCYTES NFR BLD: 8 % (ref 4–12)
MONOCYTES NFR BLD: 8 % (ref 4–12)
NEUTS SEG # BLD: 11.7 K/UL (ref 1.7–8.2)
NEUTS SEG # BLD: 13.3 K/UL (ref 1.7–8.2)
NEUTS SEG NFR BLD: 81 % (ref 43–78)
NEUTS SEG NFR BLD: 81 % (ref 43–78)
NITRITE UR QL STRIP.AUTO: NEGATIVE
NRBC # BLD: 0 K/UL (ref 0–0.2)
NRBC # BLD: 0.02 K/UL (ref 0–0.2)
O2/TOTAL GAS SETTING VFR VENT: 100 %
O2/TOTAL GAS SETTING VFR VENT: 35 %
O2/TOTAL GAS SETTING VFR VENT: 35 %
O2/TOTAL GAS SETTING VFR VENT: 40 %
O2/TOTAL GAS SETTING VFR VENT: 45 %
O2/TOTAL GAS SETTING VFR VENT: 45 %
O2/TOTAL GAS SETTING VFR VENT: 65 %
O2/TOTAL GAS SETTING VFR VENT: 75 %
O2/TOTAL GAS SETTING VFR VENT: 80 %
O2/TOTAL GAS SETTING VFR VENT: 85 %
O2/TOTAL GAS SETTING VFR VENT: 90 %
OTHER OBSERVATIONS,UCOM: ABNORMAL
PCO2 BLD: 35.6 MMHG (ref 35–45)
PCO2 BLD: 38.3 MMHG (ref 35–45)
PCO2 BLD: 38.5 MMHG (ref 35–45)
PCO2 BLD: 38.9 MMHG (ref 35–45)
PCO2 BLD: 40.2 MMHG (ref 35–45)
PCO2 BLD: 40.5 MMHG (ref 35–45)
PCO2 BLD: 41.8 MMHG (ref 35–45)
PCO2 BLD: 42.4 MMHG (ref 35–45)
PCO2 BLD: 43.5 MMHG (ref 35–45)
PCO2 BLD: 51.6 MMHG (ref 35–45)
PCO2 BLD: 56.1 MMHG (ref 35–45)
PEEP RESPIRATORY: 8 CMH2O
PH BLD: 7.27 [PH] (ref 7.35–7.45)
PH BLD: 7.28 [PH] (ref 7.35–7.45)
PH BLD: 7.29 [PH] (ref 7.35–7.45)
PH BLD: 7.31 [PH] (ref 7.35–7.45)
PH BLD: 7.34 [PH] (ref 7.35–7.45)
PH BLD: 7.34 [PH] (ref 7.35–7.45)
PH BLD: 7.37 [PH] (ref 7.35–7.45)
PH BLD: 7.4 [PH] (ref 7.35–7.45)
PH BLD: 7.45 [PH] (ref 7.35–7.45)
PH BLD: 7.45 [PH] (ref 7.35–7.45)
PH BLD: 7.48 [PH] (ref 7.35–7.45)
PH UR STRIP: 5 [PH] (ref 5–9)
PHOSPHATE SERPL-MCNC: 1.8 MG/DL (ref 2.3–3.7)
PHOSPHATE SERPL-MCNC: 2 MG/DL (ref 2.3–3.7)
PHOSPHATE SERPL-MCNC: 2.2 MG/DL (ref 2.3–3.7)
PHOSPHATE SERPL-MCNC: 2.5 MG/DL (ref 2.3–3.7)
PHOSPHATE SERPL-MCNC: 3.5 MG/DL (ref 2.3–3.7)
PHOSPHATE SERPL-MCNC: 3.7 MG/DL (ref 2.3–3.7)
PLATELET # BLD AUTO: 100 K/UL (ref 150–450)
PLATELET # BLD AUTO: 126 K/UL (ref 150–450)
PLATELET # BLD AUTO: 126 K/UL (ref 150–450)
PLATELET # BLD AUTO: 129 K/UL (ref 150–450)
PLATELET # BLD AUTO: 37 K/UL (ref 150–450)
PLATELET # BLD AUTO: 39 K/UL (ref 150–450)
PLATELET # BLD AUTO: 43 K/UL (ref 150–450)
PLATELET # BLD AUTO: 44 K/UL (ref 150–450)
PLATELET # BLD AUTO: 46 K/UL (ref 150–450)
PLATELET # BLD AUTO: 67 K/UL (ref 150–450)
PLATELET # BLD AUTO: 71 K/UL (ref 150–450)
PLATELET # BLD AUTO: 74 K/UL (ref 150–450)
PLATELET COMMENTS,PCOM: ADEQUATE
PLATELET COMMENTS,PCOM: ADEQUATE
PMV BLD AUTO: 10.3 FL (ref 9.4–12.3)
PMV BLD AUTO: 10.5 FL (ref 9.4–12.3)
PMV BLD AUTO: 10.5 FL (ref 9.4–12.3)
PMV BLD AUTO: 10.7 FL (ref 9.4–12.3)
PMV BLD AUTO: 10.7 FL (ref 9.4–12.3)
PMV BLD AUTO: 11.1 FL (ref 9.4–12.3)
PMV BLD AUTO: 11.3 FL (ref 9.4–12.3)
PMV BLD AUTO: 11.4 FL (ref 9.4–12.3)
PMV BLD AUTO: 11.7 FL (ref 9.4–12.3)
PMV BLD AUTO: 12.2 FL (ref 9.4–12.3)
PMV BLD AUTO: 12.5 FL (ref 9.4–12.3)
PMV BLD AUTO: 13 FL (ref 9.4–12.3)
PO2 BLD: 113 MMHG (ref 75–100)
PO2 BLD: 274 MMHG (ref 75–100)
PO2 BLD: 51 MMHG (ref 75–100)
PO2 BLD: 69 MMHG (ref 75–100)
PO2 BLD: 71 MMHG (ref 75–100)
PO2 BLD: 71 MMHG (ref 75–100)
PO2 BLD: 73 MMHG (ref 75–100)
PO2 BLD: 76 MMHG (ref 75–100)
PO2 BLD: 77 MMHG (ref 75–100)
PO2 BLD: 82 MMHG (ref 75–100)
PO2 BLD: 94 MMHG (ref 75–100)
POTASSIUM SERPL-SCNC: 2.9 MMOL/L (ref 3.5–5.1)
POTASSIUM SERPL-SCNC: 3.4 MMOL/L (ref 3.5–5.1)
POTASSIUM SERPL-SCNC: 3.4 MMOL/L (ref 3.5–5.1)
POTASSIUM SERPL-SCNC: 3.5 MMOL/L (ref 3.5–5.1)
POTASSIUM SERPL-SCNC: 3.6 MMOL/L (ref 3.5–5.1)
POTASSIUM SERPL-SCNC: 3.8 MMOL/L (ref 3.5–5.1)
POTASSIUM SERPL-SCNC: 4.1 MMOL/L (ref 3.5–5.1)
POTASSIUM SERPL-SCNC: 4.1 MMOL/L (ref 3.5–5.1)
POTASSIUM SERPL-SCNC: 4.2 MMOL/L (ref 3.5–5.1)
POTASSIUM SERPL-SCNC: 4.2 MMOL/L (ref 3.5–5.1)
POTASSIUM SERPL-SCNC: 4.8 MMOL/L (ref 3.5–5.1)
PROT SERPL-MCNC: 4.3 G/DL (ref 6.3–8.2)
PROT SERPL-MCNC: 4.4 G/DL (ref 6.3–8.2)
PROT SERPL-MCNC: 4.5 G/DL (ref 6.3–8.2)
PROT SERPL-MCNC: 4.7 G/DL (ref 6.3–8.2)
PROT SERPL-MCNC: 4.7 G/DL (ref 6.3–8.2)
PROT SERPL-MCNC: 4.9 G/DL (ref 6.3–8.2)
PROT SERPL-MCNC: 5 G/DL (ref 6.3–8.2)
PROT UR STRIP-MCNC: ABNORMAL MG/DL
PROTHROMBIN TIME: 16.9 SEC (ref 12.5–14.7)
PROTHROMBIN TIME: 20.7 SEC (ref 12.5–14.7)
PROTHROMBIN TIME: 20.8 SEC (ref 12.5–14.7)
PROTHROMBIN TIME: 21 SEC (ref 12.5–14.7)
PROTHROMBIN TIME: 21.8 SEC (ref 12.5–14.7)
PROTHROMBIN TIME: 23.6 SEC (ref 12.5–14.7)
PROTHROMBIN TIME: 23.6 SEC (ref 12.5–14.7)
PROTHROMBIN TIME: 24.9 SEC (ref 12.5–14.7)
PROTHROMBIN TIME: 25.4 SEC (ref 12.5–14.7)
Q-T INTERVAL, ECG07: 330 MS
QRS DURATION, ECG06: 80 MS
QTC CALCULATION (BEZET), ECG08: 448 MS
RBC # BLD AUTO: 2.37 M/UL (ref 4.23–5.6)
RBC # BLD AUTO: 3.07 M/UL (ref 4.23–5.6)
RBC # BLD AUTO: 3.14 M/UL (ref 4.23–5.6)
RBC # BLD AUTO: 3.16 M/UL (ref 4.23–5.6)
RBC # BLD AUTO: 3.2 M/UL (ref 4.23–5.6)
RBC # BLD AUTO: 3.21 M/UL (ref 4.23–5.6)
RBC # BLD AUTO: 3.24 M/UL (ref 4.23–5.6)
RBC # BLD AUTO: 3.32 M/UL (ref 4.23–5.6)
RBC # BLD AUTO: 3.34 M/UL (ref 4.23–5.6)
RBC # BLD AUTO: 3.35 M/UL (ref 4.23–5.6)
RBC # BLD AUTO: 3.46 M/UL (ref 4.23–5.6)
RBC # BLD AUTO: 3.46 M/UL (ref 4.23–5.6)
RBC MORPH BLD: ABNORMAL
SAO2 % BLD: 100 % (ref 95–98)
SAO2 % BLD: 86 % (ref 95–98)
SAO2 % BLD: 92 % (ref 95–98)
SAO2 % BLD: 93 % (ref 95–98)
SAO2 % BLD: 94 % (ref 95–98)
SAO2 % BLD: 95 % (ref 95–98)
SAO2 % BLD: 97 % (ref 95–98)
SAO2 % BLD: 98 % (ref 95–98)
SERVICE CMNT-IMP: ABNORMAL
SERVICE CMNT-IMP: NORMAL
SERVICE CMNT-IMP: NORMAL
SODIUM SERPL-SCNC: 135 MMOL/L (ref 138–145)
SODIUM SERPL-SCNC: 136 MMOL/L (ref 138–145)
SODIUM SERPL-SCNC: 136 MMOL/L (ref 138–145)
SODIUM SERPL-SCNC: 137 MMOL/L (ref 136–145)
SODIUM SERPL-SCNC: 138 MMOL/L (ref 136–145)
SODIUM SERPL-SCNC: 138 MMOL/L (ref 136–145)
SODIUM SERPL-SCNC: 139 MMOL/L (ref 138–145)
SODIUM SERPL-SCNC: 141 MMOL/L (ref 138–145)
SODIUM SERPL-SCNC: 142 MMOL/L (ref 136–145)
SODIUM SERPL-SCNC: 142 MMOL/L (ref 136–145)
SP GR UR REFRACTOMETRY: 1.02 (ref 1–1.02)
SPECIMEN EXP DATE BLD: NORMAL
SPECIMEN TYPE: ABNORMAL
STATUS OF UNIT,%ST: NORMAL
TRIGL SERPL-MCNC: 53 MG/DL (ref 35–150)
TRIGL SERPL-MCNC: 98 MG/DL (ref 35–150)
UNIT DIVISION, %UDIV: 0
UROBILINOGEN UR QL STRIP.AUTO: 1 EU/DL (ref 0.2–1)
VANCOMYCIN SERPL-MCNC: 19.5 UG/ML
VANCOMYCIN TROUGH SERPL-MCNC: 22.7 UG/ML (ref 5–20)
VANCOMYCIN TROUGH SERPL-MCNC: 27.3 UG/ML (ref 5–20)
VENTILATION MODE VENT: ABNORMAL
VENTRICULAR RATE, ECG03: 111 BPM
VIT B12 SERPL-MCNC: 1194 PG/ML (ref 193–986)
VT SETTING VENT: 500 ML
VT SETTING VENT: 560 ML
VT SETTING VENT: 560 ML
WBC # BLD AUTO: 10.1 K/UL (ref 4.3–11.1)
WBC # BLD AUTO: 11.4 K/UL (ref 4.3–11.1)
WBC # BLD AUTO: 13.3 K/UL (ref 4.3–11.1)
WBC # BLD AUTO: 13.9 K/UL (ref 4.3–11.1)
WBC # BLD AUTO: 14.5 K/UL (ref 4.3–11.1)
WBC # BLD AUTO: 16.5 K/UL (ref 4.3–11.1)
WBC # BLD AUTO: 19.6 K/UL (ref 4.3–11.1)
WBC # BLD AUTO: 4.5 K/UL (ref 4.3–11.1)
WBC # BLD AUTO: 6.6 K/UL (ref 4.3–11.1)
WBC # BLD AUTO: 8.1 K/UL (ref 4.3–11.1)
WBC # BLD AUTO: 9 K/UL (ref 4.3–11.1)
WBC # BLD AUTO: 9.2 K/UL (ref 4.3–11.1)
WBC MORPH BLD: ABNORMAL
WBC MORPH BLD: ABNORMAL

## 2021-01-01 PROCEDURE — 74011250636 HC RX REV CODE- 250/636: Performed by: INTERNAL MEDICINE

## 2021-01-01 PROCEDURE — 71045 X-RAY EXAM CHEST 1 VIEW: CPT

## 2021-01-01 PROCEDURE — 94640 AIRWAY INHALATION TREATMENT: CPT

## 2021-01-01 PROCEDURE — 74011000250 HC RX REV CODE- 250: Performed by: INTERNAL MEDICINE

## 2021-01-01 PROCEDURE — 85610 PROTHROMBIN TIME: CPT

## 2021-01-01 PROCEDURE — 82803 BLOOD GASES ANY COMBINATION: CPT

## 2021-01-01 PROCEDURE — 86920 COMPATIBILITY TEST SPIN: CPT

## 2021-01-01 PROCEDURE — 74011250637 HC RX REV CODE- 250/637: Performed by: ANESTHESIOLOGY

## 2021-01-01 PROCEDURE — 74011250636 HC RX REV CODE- 250/636: Performed by: EMERGENCY MEDICINE

## 2021-01-01 PROCEDURE — 77030013794 HC KT TRNSDUC BLD EDWD -B

## 2021-01-01 PROCEDURE — 80048 BASIC METABOLIC PNL TOTAL CA: CPT

## 2021-01-01 PROCEDURE — 80076 HEPATIC FUNCTION PANEL: CPT

## 2021-01-01 PROCEDURE — 2709999900 HC NON-CHARGEABLE SUPPLY

## 2021-01-01 PROCEDURE — 74011000250 HC RX REV CODE- 250: Performed by: HOSPITALIST

## 2021-01-01 PROCEDURE — 74011000258 HC RX REV CODE- 258: Performed by: INTERNAL MEDICINE

## 2021-01-01 PROCEDURE — 85027 COMPLETE CBC AUTOMATED: CPT

## 2021-01-01 PROCEDURE — 74011250636 HC RX REV CODE- 250/636: Performed by: NURSE PRACTITIONER

## 2021-01-01 PROCEDURE — 74011000250 HC RX REV CODE- 250: Performed by: ANESTHESIOLOGY

## 2021-01-01 PROCEDURE — C9113 INJ PANTOPRAZOLE SODIUM, VIA: HCPCS | Performed by: NURSE PRACTITIONER

## 2021-01-01 PROCEDURE — 77030040361 HC SLV COMPR DVT MDII -B

## 2021-01-01 PROCEDURE — 74011250636 HC RX REV CODE- 250/636: Performed by: ANESTHESIOLOGY

## 2021-01-01 PROCEDURE — 74011000250 HC RX REV CODE- 250: Performed by: NURSE PRACTITIONER

## 2021-01-01 PROCEDURE — 74011250636 HC RX REV CODE- 250/636

## 2021-01-01 PROCEDURE — 36600 WITHDRAWAL OF ARTERIAL BLOOD: CPT

## 2021-01-01 PROCEDURE — 77030040393 HC DRSG OPTIFOAM GENT MDII -B

## 2021-01-01 PROCEDURE — 5A1955Z RESPIRATORY VENTILATION, GREATER THAN 96 CONSECUTIVE HOURS: ICD-10-PCS | Performed by: INTERNAL MEDICINE

## 2021-01-01 PROCEDURE — 94003 VENT MGMT INPAT SUBQ DAY: CPT

## 2021-01-01 PROCEDURE — 74011250637 HC RX REV CODE- 250/637: Performed by: NURSE PRACTITIONER

## 2021-01-01 PROCEDURE — 77030040922 HC BLNKT HYPOTHRM STRY -A

## 2021-01-01 PROCEDURE — 85025 COMPLETE CBC W/AUTO DIFF WBC: CPT

## 2021-01-01 PROCEDURE — 81001 URINALYSIS AUTO W/SCOPE: CPT

## 2021-01-01 PROCEDURE — P9045 ALBUMIN (HUMAN), 5%, 250 ML: HCPCS | Performed by: ANESTHESIOLOGY

## 2021-01-01 PROCEDURE — 84100 ASSAY OF PHOSPHORUS: CPT

## 2021-01-01 PROCEDURE — 77030041175 HC BAG DIGNSHLD BARD -A

## 2021-01-01 PROCEDURE — 99291 CRITICAL CARE FIRST HOUR: CPT | Performed by: PSYCHIATRY & NEUROLOGY

## 2021-01-01 PROCEDURE — 82140 ASSAY OF AMMONIA: CPT

## 2021-01-01 PROCEDURE — 77010033678 HC OXYGEN DAILY

## 2021-01-01 PROCEDURE — 74011000258 HC RX REV CODE- 258: Performed by: HOSPITALIST

## 2021-01-01 PROCEDURE — 93005 ELECTROCARDIOGRAM TRACING: CPT | Performed by: EMERGENCY MEDICINE

## 2021-01-01 PROCEDURE — 82330 ASSAY OF CALCIUM: CPT

## 2021-01-01 PROCEDURE — P9045 ALBUMIN (HUMAN), 5%, 250 ML: HCPCS | Performed by: INTERNAL MEDICINE

## 2021-01-01 PROCEDURE — P9016 RBC LEUKOCYTES REDUCED: HCPCS

## 2021-01-01 PROCEDURE — 84478 ASSAY OF TRIGLYCERIDES: CPT

## 2021-01-01 PROCEDURE — 77030037088 HC TUBE ENDOTRACH ORAL NSL COVD-A: Performed by: ANESTHESIOLOGY

## 2021-01-01 PROCEDURE — 77030005402 HC CATH RAD ART LN KT TELE -B

## 2021-01-01 PROCEDURE — 80202 ASSAY OF VANCOMYCIN: CPT

## 2021-01-01 PROCEDURE — 95822 EEG COMA OR SLEEP ONLY: CPT | Performed by: PSYCHIATRY & NEUROLOGY

## 2021-01-01 PROCEDURE — 65610000001 HC ROOM ICU GENERAL

## 2021-01-01 PROCEDURE — 74011000258 HC RX REV CODE- 258: Performed by: ANESTHESIOLOGY

## 2021-01-01 PROCEDURE — 76040000026: Performed by: INTERNAL MEDICINE

## 2021-01-01 PROCEDURE — 76060000032 HC ANESTHESIA 0.5 TO 1 HR: Performed by: INTERNAL MEDICINE

## 2021-01-01 PROCEDURE — 85018 HEMOGLOBIN: CPT

## 2021-01-01 PROCEDURE — 82962 GLUCOSE BLOOD TEST: CPT

## 2021-01-01 PROCEDURE — 36430 TRANSFUSION BLD/BLD COMPNT: CPT

## 2021-01-01 PROCEDURE — 82607 VITAMIN B-12: CPT

## 2021-01-01 PROCEDURE — 85730 THROMBOPLASTIN TIME PARTIAL: CPT

## 2021-01-01 PROCEDURE — 82077 ASSAY SPEC XCP UR&BREATH IA: CPT

## 2021-01-01 PROCEDURE — 94002 VENT MGMT INPAT INIT DAY: CPT

## 2021-01-01 PROCEDURE — 74011000258 HC RX REV CODE- 258: Performed by: EMERGENCY MEDICINE

## 2021-01-01 PROCEDURE — 83735 ASSAY OF MAGNESIUM: CPT

## 2021-01-01 PROCEDURE — XW0G886 INTRODUCTION OF MINERAL-BASED TOPICAL HEMOSTATIC AGENT INTO UPPER GI, VIA NATURAL OR ARTIFICIAL OPENING ENDOSCOPIC, NEW TECHNOLOGY GROUP 6: ICD-10-PCS | Performed by: INTERNAL MEDICINE

## 2021-01-01 PROCEDURE — 99239 HOSP IP/OBS DSCHRG MGMT >30: CPT | Performed by: INTERNAL MEDICINE

## 2021-01-01 PROCEDURE — 77030034850

## 2021-01-01 PROCEDURE — 36415 COLL VENOUS BLD VENIPUNCTURE: CPT

## 2021-01-01 PROCEDURE — 83690 ASSAY OF LIPASE: CPT

## 2021-01-01 PROCEDURE — 99223 1ST HOSP IP/OBS HIGH 75: CPT | Performed by: INTERNAL MEDICINE

## 2021-01-01 PROCEDURE — 82746 ASSAY OF FOLIC ACID SERUM: CPT

## 2021-01-01 PROCEDURE — 31500 INSERT EMERGENCY AIRWAY: CPT

## 2021-01-01 PROCEDURE — 95816 EEG AWAKE AND DROWSY: CPT | Performed by: PSYCHIATRY & NEUROLOGY

## 2021-01-01 PROCEDURE — 36592 COLLECT BLOOD FROM PICC: CPT

## 2021-01-01 PROCEDURE — 74011250636 HC RX REV CODE- 250/636: Performed by: HOSPITALIST

## 2021-01-01 PROCEDURE — 96374 THER/PROPH/DIAG INJ IV PUSH: CPT

## 2021-01-01 PROCEDURE — 99232 SBSQ HOSP IP/OBS MODERATE 35: CPT | Performed by: PSYCHIATRY & NEUROLOGY

## 2021-01-01 PROCEDURE — C9113 INJ PANTOPRAZOLE SODIUM, VIA: HCPCS | Performed by: EMERGENCY MEDICINE

## 2021-01-01 PROCEDURE — 77030022875 HC PRB AR PLSM COAG ERBE -C: Performed by: INTERNAL MEDICINE

## 2021-01-01 PROCEDURE — 80053 COMPREHEN METABOLIC PANEL: CPT

## 2021-01-01 PROCEDURE — 87040 BLOOD CULTURE FOR BACTERIA: CPT

## 2021-01-01 PROCEDURE — 96375 TX/PRO/DX INJ NEW DRUG ADDON: CPT

## 2021-01-01 PROCEDURE — 74011000250 HC RX REV CODE- 250

## 2021-01-01 PROCEDURE — APPSS45 APP SPLIT SHARED TIME 31-45 MINUTES: Performed by: NURSE PRACTITIONER

## 2021-01-01 PROCEDURE — 83540 ASSAY OF IRON: CPT

## 2021-01-01 PROCEDURE — 77030008703 HC TU ET UNCUF COVD -A: Performed by: ANESTHESIOLOGY

## 2021-01-01 PROCEDURE — P9035 PLATELET PHERES LEUKOREDUCED: HCPCS

## 2021-01-01 PROCEDURE — 76450000000

## 2021-01-01 PROCEDURE — 0BH17EZ INSERTION OF ENDOTRACHEAL AIRWAY INTO TRACHEA, VIA NATURAL OR ARTIFICIAL OPENING: ICD-10-PCS | Performed by: INTERNAL MEDICINE

## 2021-01-01 PROCEDURE — 02HV33Z INSERTION OF INFUSION DEVICE INTO SUPERIOR VENA CAVA, PERCUTANEOUS APPROACH: ICD-10-PCS | Performed by: INTERNAL MEDICINE

## 2021-01-01 PROCEDURE — 86923 COMPATIBILITY TEST ELECTRIC: CPT

## 2021-01-01 PROCEDURE — 77030018798 HC PMP KT ENTRL FED COVD -A

## 2021-01-01 PROCEDURE — 84132 ASSAY OF SERUM POTASSIUM: CPT

## 2021-01-01 PROCEDURE — 30243R1 TRANSFUSION OF NONAUTOLOGOUS PLATELETS INTO CENTRAL VEIN, PERCUTANEOUS APPROACH: ICD-10-PCS | Performed by: INTERNAL MEDICINE

## 2021-01-01 PROCEDURE — 77030039425 HC BLD LARYNG TRULITE DISP TELE -A: Performed by: ANESTHESIOLOGY

## 2021-01-01 PROCEDURE — 99291 CRITICAL CARE FIRST HOUR: CPT | Performed by: INTERNAL MEDICINE

## 2021-01-01 PROCEDURE — 82550 ASSAY OF CK (CPK): CPT

## 2021-01-01 PROCEDURE — C1751 CATH, INF, PER/CENT/MIDLINE: HCPCS

## 2021-01-01 PROCEDURE — 74011000250 HC RX REV CODE- 250: Performed by: EMERGENCY MEDICINE

## 2021-01-01 PROCEDURE — 30243N1 TRANSFUSION OF NONAUTOLOGOUS RED BLOOD CELLS INTO CENTRAL VEIN, PERCUTANEOUS APPROACH: ICD-10-PCS | Performed by: INTERNAL MEDICINE

## 2021-01-01 PROCEDURE — 77030041406 HC DEV SEAL ENDO HEMO COOK -G1: Performed by: INTERNAL MEDICINE

## 2021-01-01 PROCEDURE — 36556 INSERT NON-TUNNEL CV CATH: CPT | Performed by: INTERNAL MEDICINE

## 2021-01-01 PROCEDURE — 99285 EMERGENCY DEPT VISIT HI MDM: CPT

## 2021-01-01 PROCEDURE — 86901 BLOOD TYPING SEROLOGIC RH(D): CPT

## 2021-01-01 PROCEDURE — 85384 FIBRINOGEN ACTIVITY: CPT

## 2021-01-01 PROCEDURE — 74011636637 HC RX REV CODE- 636/637: Performed by: ANESTHESIOLOGY

## 2021-01-01 PROCEDURE — APPSS30 APP SPLIT SHARED TIME 16-30 MINUTES: Performed by: NURSE PRACTITIONER

## 2021-01-01 PROCEDURE — 30243K1 TRANSFUSION OF NONAUTOLOGOUS FROZEN PLASMA INTO CENTRAL VEIN, PERCUTANEOUS APPROACH: ICD-10-PCS | Performed by: INTERNAL MEDICINE

## 2021-01-01 PROCEDURE — 99233 SBSQ HOSP IP/OBS HIGH 50: CPT | Performed by: INTERNAL MEDICINE

## 2021-01-01 PROCEDURE — 99497 ADVNCD CARE PLAN 30 MIN: CPT | Performed by: INTERNAL MEDICINE

## 2021-01-01 PROCEDURE — P9059 PLASMA, FRZ BETWEEN 8-24HOUR: HCPCS

## 2021-01-01 PROCEDURE — 2709999900 HC NON-CHARGEABLE SUPPLY: Performed by: INTERNAL MEDICINE

## 2021-01-01 PROCEDURE — 74011250637 HC RX REV CODE- 250/637: Performed by: INTERNAL MEDICINE

## 2021-01-01 RX ORDER — SODIUM CHLORIDE 9 MG/ML
250 INJECTION, SOLUTION INTRAVENOUS AS NEEDED
Status: DISCONTINUED | OUTPATIENT
Start: 2021-01-01 | End: 2021-01-01 | Stop reason: HOSPADM

## 2021-01-01 RX ORDER — FENTANYL CITRATE 50 UG/ML
100 INJECTION, SOLUTION INTRAMUSCULAR; INTRAVENOUS ONCE
Status: DISCONTINUED | OUTPATIENT
Start: 2021-01-01 | End: 2021-01-01 | Stop reason: SDUPTHER

## 2021-01-01 RX ORDER — ONDANSETRON 2 MG/ML
4 INJECTION INTRAMUSCULAR; INTRAVENOUS
Status: COMPLETED | OUTPATIENT
Start: 2021-01-01 | End: 2021-01-01

## 2021-01-01 RX ORDER — SODIUM CHLORIDE 0.9 % (FLUSH) 0.9 %
5-40 SYRINGE (ML) INJECTION AS NEEDED
Status: DISCONTINUED | OUTPATIENT
Start: 2021-01-01 | End: 2021-01-01 | Stop reason: HOSPADM

## 2021-01-01 RX ORDER — SUCCINYLCHOLINE CHLORIDE 20 MG/ML INJECTION SOLUTION
SOLUTION
Status: COMPLETED
Start: 2021-01-01 | End: 2021-01-01

## 2021-01-01 RX ORDER — SODIUM CHLORIDE 0.9 % (FLUSH) 0.9 %
5-40 SYRINGE (ML) INJECTION EVERY 8 HOURS
Status: DISCONTINUED | OUTPATIENT
Start: 2021-01-01 | End: 2021-01-01 | Stop reason: HOSPADM

## 2021-01-01 RX ORDER — DEXTROSE MONOHYDRATE AND SODIUM CHLORIDE 5; .9 G/100ML; G/100ML
50 INJECTION, SOLUTION INTRAVENOUS CONTINUOUS
Status: DISPENSED | OUTPATIENT
Start: 2021-01-01 | End: 2021-01-01

## 2021-01-01 RX ORDER — OXYCODONE HYDROCHLORIDE 5 MG/1
5 TABLET ORAL
Status: DISCONTINUED | OUTPATIENT
Start: 2021-01-01 | End: 2021-01-01

## 2021-01-01 RX ORDER — DEXMEDETOMIDINE HYDROCHLORIDE 4 UG/ML
.1-1.5 INJECTION, SOLUTION INTRAVENOUS
Status: DISCONTINUED | OUTPATIENT
Start: 2021-01-01 | End: 2021-01-01

## 2021-01-01 RX ORDER — MIDAZOLAM HYDROCHLORIDE 1 MG/ML
2 INJECTION, SOLUTION INTRAMUSCULAR; INTRAVENOUS
Status: DISCONTINUED | OUTPATIENT
Start: 2021-01-01 | End: 2021-01-01

## 2021-01-01 RX ORDER — LORAZEPAM 2 MG/ML
INJECTION INTRAMUSCULAR
Status: COMPLETED
Start: 2021-01-01 | End: 2021-01-01

## 2021-01-01 RX ORDER — SODIUM CHLORIDE, SODIUM LACTATE, POTASSIUM CHLORIDE, CALCIUM CHLORIDE 600; 310; 30; 20 MG/100ML; MG/100ML; MG/100ML; MG/100ML
100 INJECTION, SOLUTION INTRAVENOUS CONTINUOUS
Status: DISCONTINUED | OUTPATIENT
Start: 2021-01-01 | End: 2021-01-01 | Stop reason: SDUPTHER

## 2021-01-01 RX ORDER — ONDANSETRON 2 MG/ML
4 INJECTION INTRAMUSCULAR; INTRAVENOUS
Status: DISCONTINUED | OUTPATIENT
Start: 2021-01-01 | End: 2021-01-01 | Stop reason: HOSPADM

## 2021-01-01 RX ORDER — MIDAZOLAM HYDROCHLORIDE 1 MG/ML
2 INJECTION, SOLUTION INTRAMUSCULAR; INTRAVENOUS ONCE
Status: DISCONTINUED | OUTPATIENT
Start: 2021-01-01 | End: 2021-01-01 | Stop reason: SDUPTHER

## 2021-01-01 RX ORDER — OFLOXACIN 3 MG/ML
1 SOLUTION/ DROPS OPHTHALMIC 4 TIMES DAILY
Status: DISCONTINUED | OUTPATIENT
Start: 2021-01-01 | End: 2021-01-01 | Stop reason: HOSPADM

## 2021-01-01 RX ORDER — LORAZEPAM 2 MG/ML
1 INJECTION INTRAMUSCULAR
Status: DISCONTINUED | OUTPATIENT
Start: 2021-01-01 | End: 2021-01-01 | Stop reason: HOSPADM

## 2021-01-01 RX ORDER — HYDROMORPHONE HYDROCHLORIDE 2 MG/ML
0.5 INJECTION, SOLUTION INTRAMUSCULAR; INTRAVENOUS; SUBCUTANEOUS
Status: DISCONTINUED | OUTPATIENT
Start: 2021-01-01 | End: 2021-01-01 | Stop reason: SDUPTHER

## 2021-01-01 RX ORDER — HALOPERIDOL 5 MG/ML
4 INJECTION INTRAMUSCULAR
Status: DISCONTINUED | OUTPATIENT
Start: 2021-01-01 | End: 2021-01-01

## 2021-01-01 RX ORDER — CALCIUM GLUCONATE 20 MG/ML
1 INJECTION, SOLUTION INTRAVENOUS ONCE
Status: DISCONTINUED | OUTPATIENT
Start: 2021-01-01 | End: 2021-01-01

## 2021-01-01 RX ORDER — ALBUMIN HUMAN 50 G/1000ML
25 SOLUTION INTRAVENOUS EVERY 12 HOURS
Status: COMPLETED | OUTPATIENT
Start: 2021-01-01 | End: 2021-01-01

## 2021-01-01 RX ORDER — POTASSIUM CHLORIDE 14.9 MG/ML
20 INJECTION INTRAVENOUS
Status: COMPLETED | OUTPATIENT
Start: 2021-01-01 | End: 2021-01-01

## 2021-01-01 RX ORDER — GLYCOPYRROLATE 0.2 MG/ML
0.1 INJECTION INTRAMUSCULAR; INTRAVENOUS ONCE
Status: COMPLETED | OUTPATIENT
Start: 2021-01-01 | End: 2021-01-01

## 2021-01-01 RX ORDER — DEXTROSE MONOHYDRATE 100 MG/ML
42 INJECTION, SOLUTION INTRAVENOUS CONTINUOUS
Status: DISPENSED | OUTPATIENT
Start: 2021-01-01 | End: 2021-01-01

## 2021-01-01 RX ORDER — HYDROMORPHONE HYDROCHLORIDE 1 MG/ML
0.5 INJECTION, SOLUTION INTRAMUSCULAR; INTRAVENOUS; SUBCUTANEOUS
Status: DISCONTINUED | OUTPATIENT
Start: 2021-01-01 | End: 2021-01-01

## 2021-01-01 RX ORDER — PROPOFOL 10 MG/ML
0-50 VIAL (ML) INTRAVENOUS
Status: DISCONTINUED | OUTPATIENT
Start: 2021-01-01 | End: 2021-01-01

## 2021-01-01 RX ORDER — HALOPERIDOL 5 MG/ML
2 INJECTION INTRAMUSCULAR
Status: DISCONTINUED | OUTPATIENT
Start: 2021-01-01 | End: 2021-01-01 | Stop reason: HOSPADM

## 2021-01-01 RX ORDER — VANCOMYCIN/0.9 % SOD CHLORIDE 1.5G/250ML
1500 PLASTIC BAG, INJECTION (ML) INTRAVENOUS EVERY 12 HOURS
Status: DISCONTINUED | OUTPATIENT
Start: 2021-01-01 | End: 2021-01-01

## 2021-01-01 RX ORDER — SUCCINYLCHOLINE CHLORIDE 20 MG/ML
120 INJECTION INTRAMUSCULAR; INTRAVENOUS
Status: COMPLETED | OUTPATIENT
Start: 2021-01-01 | End: 2021-01-01

## 2021-01-01 RX ORDER — ACETAMINOPHEN 650 MG/1
650 SUPPOSITORY RECTAL
Status: DISCONTINUED | OUTPATIENT
Start: 2021-01-01 | End: 2021-01-01

## 2021-01-01 RX ORDER — IBUPROFEN 200 MG
1 TABLET ORAL EVERY 24 HOURS
Status: DISCONTINUED | OUTPATIENT
Start: 2021-01-01 | End: 2021-01-01 | Stop reason: HOSPADM

## 2021-01-01 RX ORDER — DIPHENHYDRAMINE HYDROCHLORIDE 50 MG/ML
12.5 INJECTION, SOLUTION INTRAMUSCULAR; INTRAVENOUS
Status: DISCONTINUED | OUTPATIENT
Start: 2021-01-01 | End: 2021-01-01

## 2021-01-01 RX ORDER — CARBOXYMETHYLCELLULOSE SODIUM 10 MG/ML
1 GEL OPHTHALMIC AS NEEDED
Status: DISCONTINUED | OUTPATIENT
Start: 2021-01-01 | End: 2021-01-01 | Stop reason: HOSPADM

## 2021-01-01 RX ORDER — LACTULOSE 10 G/15ML
200 SOLUTION ORAL; RECTAL 3 TIMES DAILY
Status: DISCONTINUED | OUTPATIENT
Start: 2021-01-01 | End: 2021-01-01

## 2021-01-01 RX ORDER — MORPHINE SULFATE 2 MG/ML
2 INJECTION, SOLUTION INTRAMUSCULAR; INTRAVENOUS
Status: DISCONTINUED | OUTPATIENT
Start: 2021-01-01 | End: 2021-01-01 | Stop reason: HOSPADM

## 2021-01-01 RX ORDER — NOREPINEPHRINE BITARTRATE/D5W 4MG/250ML
.5-16 PLASTIC BAG, INJECTION (ML) INTRAVENOUS
Status: DISCONTINUED | OUTPATIENT
Start: 2021-01-01 | End: 2021-01-01 | Stop reason: SDUPTHER

## 2021-01-01 RX ORDER — ETOMIDATE 2 MG/ML
INJECTION INTRAVENOUS
Status: COMPLETED
Start: 2021-01-01 | End: 2021-01-01

## 2021-01-01 RX ORDER — ACETAMINOPHEN 325 MG/1
650 TABLET ORAL
Status: DISCONTINUED | OUTPATIENT
Start: 2021-01-01 | End: 2021-01-01 | Stop reason: HOSPADM

## 2021-01-01 RX ORDER — FENTANYL CITRATE-0.9 % NACL/PF 25 MCG/ML
0-1.5 PLASTIC BAG, INJECTION (ML) INJECTION
Status: DISCONTINUED | OUTPATIENT
Start: 2021-01-01 | End: 2021-01-01

## 2021-01-01 RX ORDER — DEXTROSE MONOHYDRATE 100 MG/ML
42 INJECTION, SOLUTION INTRAVENOUS ONCE
Status: COMPLETED | OUTPATIENT
Start: 2021-01-01 | End: 2021-01-01

## 2021-01-01 RX ORDER — PROMETHAZINE HYDROCHLORIDE 25 MG/1
12.5 TABLET ORAL
Status: DISCONTINUED | OUTPATIENT
Start: 2021-01-01 | End: 2021-01-01 | Stop reason: HOSPADM

## 2021-01-01 RX ORDER — LORAZEPAM 2 MG/ML
2 INJECTION INTRAMUSCULAR ONCE
Status: COMPLETED | OUTPATIENT
Start: 2021-01-01 | End: 2021-01-01

## 2021-01-01 RX ORDER — LIDOCAINE HYDROCHLORIDE 10 MG/ML
0.1 INJECTION INFILTRATION; PERINEURAL AS NEEDED
Status: DISCONTINUED | OUTPATIENT
Start: 2021-01-01 | End: 2021-01-01

## 2021-01-01 RX ORDER — IPRATROPIUM BROMIDE AND ALBUTEROL SULFATE 2.5; .5 MG/3ML; MG/3ML
3 SOLUTION RESPIRATORY (INHALATION)
Status: DISCONTINUED | OUTPATIENT
Start: 2021-01-01 | End: 2021-01-01

## 2021-01-01 RX ORDER — METOCLOPRAMIDE HYDROCHLORIDE 5 MG/ML
5 INJECTION INTRAMUSCULAR; INTRAVENOUS ONCE
Status: COMPLETED | OUTPATIENT
Start: 2021-01-01 | End: 2021-01-01

## 2021-01-01 RX ORDER — ETOMIDATE 2 MG/ML
24 INJECTION INTRAVENOUS ONCE
Status: COMPLETED | OUTPATIENT
Start: 2021-01-01 | End: 2021-01-01

## 2021-01-01 RX ORDER — FENTANYL CITRATE-0.9 % NACL/PF 25 MCG/ML
0-200 PLASTIC BAG, INJECTION (ML) INJECTION
Status: DISCONTINUED | OUTPATIENT
Start: 2021-01-01 | End: 2021-01-01

## 2021-01-01 RX ORDER — ALBUTEROL SULFATE 0.83 MG/ML
2.5 SOLUTION RESPIRATORY (INHALATION)
Status: DISCONTINUED | OUTPATIENT
Start: 2021-01-01 | End: 2021-01-01

## 2021-01-01 RX ORDER — SODIUM BICARBONATE 84 MG/ML
50 INJECTION, SOLUTION INTRAVENOUS ONCE
Status: COMPLETED | OUTPATIENT
Start: 2021-01-01 | End: 2021-01-01

## 2021-01-01 RX ORDER — NALOXONE HYDROCHLORIDE 0.4 MG/ML
0.1 INJECTION, SOLUTION INTRAMUSCULAR; INTRAVENOUS; SUBCUTANEOUS AS NEEDED
Status: DISCONTINUED | OUTPATIENT
Start: 2021-01-01 | End: 2021-01-01

## 2021-01-01 RX ORDER — ONDANSETRON 2 MG/ML
4 INJECTION INTRAMUSCULAR; INTRAVENOUS ONCE
Status: DISCONTINUED | OUTPATIENT
Start: 2021-01-01 | End: 2021-01-01 | Stop reason: SDUPTHER

## 2021-01-01 RX ORDER — ALBUTEROL SULFATE 0.83 MG/ML
2.5 SOLUTION RESPIRATORY (INHALATION) AS NEEDED
Status: DISCONTINUED | OUTPATIENT
Start: 2021-01-01 | End: 2021-01-01 | Stop reason: HOSPADM

## 2021-01-01 RX ORDER — VANCOMYCIN 2 GRAM/500 ML IN 0.9 % SODIUM CHLORIDE INTRAVENOUS
2000 ONCE
Status: COMPLETED | OUTPATIENT
Start: 2021-01-01 | End: 2021-01-01

## 2021-01-01 RX ORDER — POLYETHYLENE GLYCOL 3350 17 G/17G
17 POWDER, FOR SOLUTION ORAL DAILY PRN
Status: DISCONTINUED | OUTPATIENT
Start: 2021-01-01 | End: 2021-01-01 | Stop reason: HOSPADM

## 2021-01-01 RX ORDER — POLYVINYL ALCOHOL 14 MG/ML
1 SOLUTION/ DROPS OPHTHALMIC AS NEEDED
Status: DISCONTINUED | OUTPATIENT
Start: 2021-01-01 | End: 2021-01-01

## 2021-01-01 RX ORDER — OCTREOTIDE ACETATE 100 UG/ML
100 INJECTION, SOLUTION INTRAVENOUS; SUBCUTANEOUS
Status: COMPLETED | OUTPATIENT
Start: 2021-01-01 | End: 2021-01-01

## 2021-01-01 RX ORDER — OXYCODONE HYDROCHLORIDE 5 MG/1
10 TABLET ORAL
Status: DISCONTINUED | OUTPATIENT
Start: 2021-01-01 | End: 2021-01-01

## 2021-01-01 RX ORDER — SODIUM CHLORIDE 0.9 % (FLUSH) 0.9 %
5-40 SYRINGE (ML) INJECTION EVERY 8 HOURS
Status: DISCONTINUED | OUTPATIENT
Start: 2021-01-01 | End: 2021-01-01 | Stop reason: SDUPTHER

## 2021-01-01 RX ADMIN — POTASSIUM PHOSPHATE, MONOBASIC AND POTASSIUM PHOSPHATE, DIBASIC: 224; 236 INJECTION, SOLUTION, CONCENTRATE INTRAVENOUS at 10:34

## 2021-01-01 RX ADMIN — PANTOPRAZOLE SODIUM 40 MG: 40 INJECTION, POWDER, FOR SOLUTION INTRAVENOUS at 20:41

## 2021-01-01 RX ADMIN — MEROPENEM 500 MG: 500 INJECTION, POWDER, FOR SOLUTION INTRAVENOUS at 02:00

## 2021-01-01 RX ADMIN — NOREPINEPHRINE-DEXTROSE IV SOLUTION 4 MG/250ML-5% 4 MCG/MIN: 4-5/250 SOLUTION at 09:41

## 2021-01-01 RX ADMIN — SODIUM CHLORIDE 0.4 MCG/KG/HR: 9 INJECTION, SOLUTION INTRAVENOUS at 06:41

## 2021-01-01 RX ADMIN — VANCOMYCIN HYDROCHLORIDE 1000 MG: 1 INJECTION, POWDER, LYOPHILIZED, FOR SOLUTION INTRAVENOUS at 20:14

## 2021-01-01 RX ADMIN — SODIUM CHLORIDE 10 ML: 9 INJECTION, SOLUTION INTRAMUSCULAR; INTRAVENOUS; SUBCUTANEOUS at 21:48

## 2021-01-01 RX ADMIN — MEROPENEM 500 MG: 500 INJECTION, POWDER, FOR SOLUTION INTRAVENOUS at 08:03

## 2021-01-01 RX ADMIN — PIPERACILLIN SODIUM AND TAZOBACTAM SODIUM 3.38 G: 3; .375 INJECTION, POWDER, LYOPHILIZED, FOR SOLUTION INTRAVENOUS at 05:08

## 2021-01-01 RX ADMIN — PROPOFOL 10 MCG/KG/MIN: 10 INJECTION, EMULSION INTRAVENOUS at 23:15

## 2021-01-01 RX ADMIN — LORAZEPAM 1 MG: 2 INJECTION INTRAMUSCULAR; INTRAVENOUS at 11:34

## 2021-01-01 RX ADMIN — FOLIC ACID: 5 INJECTION, SOLUTION INTRAMUSCULAR; INTRAVENOUS; SUBCUTANEOUS at 12:19

## 2021-01-01 RX ADMIN — PIPERACILLIN SODIUM AND TAZOBACTAM SODIUM 3.38 G: 3; .375 INJECTION, POWDER, LYOPHILIZED, FOR SOLUTION INTRAVENOUS at 13:57

## 2021-01-01 RX ADMIN — I.V. FAT EMULSION 250 ML: 20 EMULSION INTRAVENOUS at 17:37

## 2021-01-01 RX ADMIN — PIPERACILLIN SODIUM AND TAZOBACTAM SODIUM 3.38 G: 3; .375 INJECTION, POWDER, LYOPHILIZED, FOR SOLUTION INTRAVENOUS at 05:16

## 2021-01-01 RX ADMIN — PIPERACILLIN SODIUM AND TAZOBACTAM SODIUM 3.38 G: 3; .375 INJECTION, POWDER, LYOPHILIZED, FOR SOLUTION INTRAVENOUS at 14:06

## 2021-01-01 RX ADMIN — PROPOFOL 10 MCG/KG/MIN: 10 INJECTION, EMULSION INTRAVENOUS at 12:19

## 2021-01-01 RX ADMIN — FENTANYL CITRATE 50 MCG/HR: 0.05 INJECTION, SOLUTION INTRAMUSCULAR; INTRAVENOUS at 07:11

## 2021-01-01 RX ADMIN — POTASSIUM CHLORIDE 20 MEQ: 200 INJECTION, SOLUTION INTRAVENOUS at 04:57

## 2021-01-01 RX ADMIN — OCTREOTIDE ACETATE 50 MCG/HR: 500 INJECTION, SOLUTION INTRAVENOUS; SUBCUTANEOUS at 03:21

## 2021-01-01 RX ADMIN — ALBUMIN (HUMAN) 25 G: 12.5 INJECTION, SOLUTION INTRAVENOUS at 09:01

## 2021-01-01 RX ADMIN — DESMOPRESSIN ACETATE 24.48 MCG: 4 SOLUTION INTRAVENOUS at 19:47

## 2021-01-01 RX ADMIN — SODIUM CHLORIDE 10 ML: 9 INJECTION, SOLUTION INTRAMUSCULAR; INTRAVENOUS; SUBCUTANEOUS at 20:23

## 2021-01-01 RX ADMIN — SODIUM CHLORIDE 10 ML: 9 INJECTION, SOLUTION INTRAMUSCULAR; INTRAVENOUS; SUBCUTANEOUS at 22:00

## 2021-01-01 RX ADMIN — ALBUMIN (HUMAN) 25 G: 12.5 INJECTION, SOLUTION INTRAVENOUS at 21:03

## 2021-01-01 RX ADMIN — PANTOPRAZOLE SODIUM 40 MG: 40 INJECTION, POWDER, FOR SOLUTION INTRAVENOUS at 08:43

## 2021-01-01 RX ADMIN — PANTOPRAZOLE SODIUM 40 MG: 40 INJECTION, POWDER, FOR SOLUTION INTRAVENOUS at 08:03

## 2021-01-01 RX ADMIN — NOREPINEPHRINE-DEXTROSE IV SOLUTION 4 MG/250ML-5% 4 MCG/MIN: 4-5/250 SOLUTION at 22:33

## 2021-01-01 RX ADMIN — SODIUM CHLORIDE 1000 ML: 900 INJECTION, SOLUTION INTRAVENOUS at 15:28

## 2021-01-01 RX ADMIN — LACTULOSE 300 ML: 10 SOLUTION ORAL; RECTAL at 16:00

## 2021-01-01 RX ADMIN — SODIUM CHLORIDE 10 ML: 9 INJECTION, SOLUTION INTRAMUSCULAR; INTRAVENOUS; SUBCUTANEOUS at 21:09

## 2021-01-01 RX ADMIN — OCTREOTIDE ACETATE 50 MCG/HR: 500 INJECTION, SOLUTION INTRAVENOUS; SUBCUTANEOUS at 21:03

## 2021-01-01 RX ADMIN — SODIUM CHLORIDE: 234 INJECTION INTRAMUSCULAR; INTRAVENOUS; SUBCUTANEOUS at 17:38

## 2021-01-01 RX ADMIN — OFLOXACIN 1 DROP: 3 SOLUTION/ DROPS OPHTHALMIC at 22:06

## 2021-01-01 RX ADMIN — SODIUM CHLORIDE 10 ML: 9 INJECTION, SOLUTION INTRAMUSCULAR; INTRAVENOUS; SUBCUTANEOUS at 05:16

## 2021-01-01 RX ADMIN — SODIUM CHLORIDE 10 ML: 9 INJECTION, SOLUTION INTRAMUSCULAR; INTRAVENOUS; SUBCUTANEOUS at 14:37

## 2021-01-01 RX ADMIN — MEROPENEM 500 MG: 500 INJECTION, POWDER, FOR SOLUTION INTRAVENOUS at 02:21

## 2021-01-01 RX ADMIN — PANTOPRAZOLE SODIUM 40 MG: 40 INJECTION, POWDER, FOR SOLUTION INTRAVENOUS at 20:30

## 2021-01-01 RX ADMIN — SODIUM CHLORIDE 0.4 MCG/KG/HR: 9 INJECTION, SOLUTION INTRAVENOUS at 17:30

## 2021-01-01 RX ADMIN — SODIUM CHLORIDE 0.5 MCG/KG/HR: 9 INJECTION, SOLUTION INTRAVENOUS at 18:32

## 2021-01-01 RX ADMIN — ETOMIDATE 24 MG: 2 INJECTION INTRAVENOUS at 09:21

## 2021-01-01 RX ADMIN — SODIUM CHLORIDE 10 ML: 9 INJECTION, SOLUTION INTRAMUSCULAR; INTRAVENOUS; SUBCUTANEOUS at 14:00

## 2021-01-01 RX ADMIN — PIPERACILLIN SODIUM AND TAZOBACTAM SODIUM 3.38 G: 3; .375 INJECTION, POWDER, LYOPHILIZED, FOR SOLUTION INTRAVENOUS at 21:36

## 2021-01-01 RX ADMIN — SODIUM CHLORIDE 10 ML: 9 INJECTION, SOLUTION INTRAMUSCULAR; INTRAVENOUS; SUBCUTANEOUS at 21:33

## 2021-01-01 RX ADMIN — OCTREOTIDE ACETATE 50 MCG/HR: 500 INJECTION, SOLUTION INTRAVENOUS; SUBCUTANEOUS at 13:09

## 2021-01-01 RX ADMIN — PIPERACILLIN SODIUM AND TAZOBACTAM SODIUM 3.38 G: 3; .375 INJECTION, POWDER, LYOPHILIZED, FOR SOLUTION INTRAVENOUS at 12:20

## 2021-01-01 RX ADMIN — SODIUM CHLORIDE 10 ML: 9 INJECTION, SOLUTION INTRAMUSCULAR; INTRAVENOUS; SUBCUTANEOUS at 22:06

## 2021-01-01 RX ADMIN — VANCOMYCIN HYDROCHLORIDE 2000 MG: 10 INJECTION, POWDER, LYOPHILIZED, FOR SOLUTION INTRAVENOUS at 09:32

## 2021-01-01 RX ADMIN — SODIUM CHLORIDE 10 ML: 9 INJECTION, SOLUTION INTRAMUSCULAR; INTRAVENOUS; SUBCUTANEOUS at 05:08

## 2021-01-01 RX ADMIN — PROPOFOL 10 MCG/KG/MIN: 10 INJECTION, EMULSION INTRAVENOUS at 12:05

## 2021-01-01 RX ADMIN — SODIUM CHLORIDE 10 ML: 9 INJECTION, SOLUTION INTRAMUSCULAR; INTRAVENOUS; SUBCUTANEOUS at 14:07

## 2021-01-01 RX ADMIN — DEXTROSE MONOHYDRATE 5 MCG/MIN: 50 INJECTION, SOLUTION INTRAVENOUS at 01:05

## 2021-01-01 RX ADMIN — POTASSIUM CHLORIDE 20 MEQ: 200 INJECTION, SOLUTION INTRAVENOUS at 05:54

## 2021-01-01 RX ADMIN — LACTULOSE 300 ML: 10 SOLUTION ORAL; RECTAL at 15:25

## 2021-01-01 RX ADMIN — LORAZEPAM 2 MG: 2 INJECTION INTRAMUSCULAR at 12:21

## 2021-01-01 RX ADMIN — LACTULOSE 300 ML: 10 SOLUTION ORAL; RECTAL at 20:59

## 2021-01-01 RX ADMIN — ALBUMIN (HUMAN) 25 G: 12.5 INJECTION, SOLUTION INTRAVENOUS at 08:47

## 2021-01-01 RX ADMIN — OFLOXACIN 1 DROP: 3 SOLUTION/ DROPS OPHTHALMIC at 08:48

## 2021-01-01 RX ADMIN — CARBOXYMETHYLCELLULOSE SODIUM 1 DROP: 10 GEL OPHTHALMIC at 21:06

## 2021-01-01 RX ADMIN — CARBOXYMETHYLCELLULOSE SODIUM 1 DROP: 10 GEL OPHTHALMIC at 08:48

## 2021-01-01 RX ADMIN — VANCOMYCIN HYDROCHLORIDE 1000 MG: 1 INJECTION, POWDER, LYOPHILIZED, FOR SOLUTION INTRAVENOUS at 20:33

## 2021-01-01 RX ADMIN — PANTOPRAZOLE SODIUM 40 MG: 40 INJECTION, POWDER, FOR SOLUTION INTRAVENOUS at 08:28

## 2021-01-01 RX ADMIN — ALBUTEROL SULFATE 2.5 MG: 2.5 SOLUTION RESPIRATORY (INHALATION) at 16:45

## 2021-01-01 RX ADMIN — SODIUM CHLORIDE 10 ML: 9 INJECTION, SOLUTION INTRAMUSCULAR; INTRAVENOUS; SUBCUTANEOUS at 06:13

## 2021-01-01 RX ADMIN — ALBUMIN (HUMAN) 25 G: 12.5 INJECTION, SOLUTION INTRAVENOUS at 21:35

## 2021-01-01 RX ADMIN — PANTOPRAZOLE SODIUM 40 MG: 40 INJECTION, POWDER, FOR SOLUTION INTRAVENOUS at 07:37

## 2021-01-01 RX ADMIN — SODIUM CHLORIDE 40 ML: 9 INJECTION, SOLUTION INTRAMUSCULAR; INTRAVENOUS; SUBCUTANEOUS at 06:16

## 2021-01-01 RX ADMIN — PROPOFOL 10 MCG/KG/MIN: 10 INJECTION, EMULSION INTRAVENOUS at 10:00

## 2021-01-01 RX ADMIN — ALBUTEROL SULFATE 2.5 MG: 2.5 SOLUTION RESPIRATORY (INHALATION) at 12:00

## 2021-01-01 RX ADMIN — NOREPINEPHRINE-DEXTROSE IV SOLUTION 4 MG/250ML-5% 18 MCG/MIN: 4-5/250 SOLUTION at 06:21

## 2021-01-01 RX ADMIN — CARBOXYMETHYLCELLULOSE SODIUM 1 DROP: 10 GEL OPHTHALMIC at 14:42

## 2021-01-01 RX ADMIN — PROPOFOL 20 MCG/KG/MIN: 10 INJECTION, EMULSION INTRAVENOUS at 19:56

## 2021-01-01 RX ADMIN — NOREPINEPHRINE-DEXTROSE IV SOLUTION 4 MG/250ML-5% 14 MCG/MIN: 4-5/250 SOLUTION at 21:08

## 2021-01-01 RX ADMIN — I.V. FAT EMULSION 250 ML: 20 EMULSION INTRAVENOUS at 17:59

## 2021-01-01 RX ADMIN — PANTOPRAZOLE SODIUM 40 MG: 40 INJECTION, POWDER, FOR SOLUTION INTRAVENOUS at 21:05

## 2021-01-01 RX ADMIN — POTASSIUM CHLORIDE 20 MEQ: 200 INJECTION, SOLUTION INTRAVENOUS at 07:33

## 2021-01-01 RX ADMIN — MEROPENEM 500 MG: 500 INJECTION, POWDER, FOR SOLUTION INTRAVENOUS at 08:17

## 2021-01-01 RX ADMIN — NOREPINEPHRINE-DEXTROSE IV SOLUTION 4 MG/250ML-5% 12 MCG/MIN: 4-5/250 SOLUTION at 18:55

## 2021-01-01 RX ADMIN — LACTULOSE 300 ML: 10 SOLUTION ORAL; RECTAL at 07:37

## 2021-01-01 RX ADMIN — Medication 120 MG: at 09:20

## 2021-01-01 RX ADMIN — MORPHINE SULFATE 2 MG: 2 INJECTION, SOLUTION INTRAMUSCULAR; INTRAVENOUS at 11:34

## 2021-01-01 RX ADMIN — PIPERACILLIN SODIUM AND TAZOBACTAM SODIUM 3.38 G: 3; .375 INJECTION, POWDER, LYOPHILIZED, FOR SOLUTION INTRAVENOUS at 21:49

## 2021-01-01 RX ADMIN — SODIUM CHLORIDE 0.2 MCG/KG/HR: 9 INJECTION, SOLUTION INTRAVENOUS at 21:33

## 2021-01-01 RX ADMIN — MEROPENEM 500 MG: 500 INJECTION, POWDER, FOR SOLUTION INTRAVENOUS at 02:13

## 2021-01-01 RX ADMIN — PIPERACILLIN SODIUM AND TAZOBACTAM SODIUM 3.38 G: 3; .375 INJECTION, POWDER, LYOPHILIZED, FOR SOLUTION INTRAVENOUS at 21:31

## 2021-01-01 RX ADMIN — PROPOFOL 10 MCG/KG/MIN: 10 INJECTION, EMULSION INTRAVENOUS at 21:07

## 2021-01-01 RX ADMIN — VANCOMYCIN HYDROCHLORIDE 1500 MG: 10 INJECTION, POWDER, LYOPHILIZED, FOR SOLUTION INTRAVENOUS at 08:43

## 2021-01-01 RX ADMIN — OFLOXACIN 1 DROP: 3 SOLUTION/ DROPS OPHTHALMIC at 12:36

## 2021-01-01 RX ADMIN — VANCOMYCIN HYDROCHLORIDE 1500 MG: 10 INJECTION, POWDER, LYOPHILIZED, FOR SOLUTION INTRAVENOUS at 21:00

## 2021-01-01 RX ADMIN — LACTULOSE 300 ML: 10 SOLUTION ORAL; RECTAL at 21:33

## 2021-01-01 RX ADMIN — OCTREOTIDE ACETATE 50 MCG/HR: 500 INJECTION, SOLUTION INTRAVENOUS; SUBCUTANEOUS at 07:23

## 2021-01-01 RX ADMIN — NOREPINEPHRINE-DEXTROSE IV SOLUTION 4 MG/250ML-5% 12 MCG/MIN: 4-5/250 SOLUTION at 08:46

## 2021-01-01 RX ADMIN — CARBOXYMETHYLCELLULOSE SODIUM 1 DROP: 10 GEL OPHTHALMIC at 22:09

## 2021-01-01 RX ADMIN — LACTULOSE 300 ML: 10 SOLUTION ORAL; RECTAL at 09:00

## 2021-01-01 RX ADMIN — OFLOXACIN 1 DROP: 3 SOLUTION/ DROPS OPHTHALMIC at 17:40

## 2021-01-01 RX ADMIN — OFLOXACIN 1 DROP: 3 SOLUTION/ DROPS OPHTHALMIC at 20:06

## 2021-01-01 RX ADMIN — POTASSIUM CHLORIDE 20 MEQ: 200 INJECTION, SOLUTION INTRAVENOUS at 09:36

## 2021-01-01 RX ADMIN — SODIUM CHLORIDE: 234 INJECTION, SOLUTION INTRAVENOUS at 17:59

## 2021-01-01 RX ADMIN — FENTANYL CITRATE 50 MCG/HR: 0.05 INJECTION, SOLUTION INTRAMUSCULAR; INTRAVENOUS at 09:30

## 2021-01-01 RX ADMIN — OFLOXACIN 1 DROP: 3 SOLUTION/ DROPS OPHTHALMIC at 17:38

## 2021-01-01 RX ADMIN — OCTREOTIDE ACETATE 50 MCG/HR: 500 INJECTION, SOLUTION INTRAVENOUS; SUBCUTANEOUS at 10:43

## 2021-01-01 RX ADMIN — NOREPINEPHRINE-DEXTROSE IV SOLUTION 4 MG/250ML-5% 3 MCG/MIN: 4-5/250 SOLUTION at 19:09

## 2021-01-01 RX ADMIN — SODIUM BICARBONATE 50 MEQ: 84 INJECTION, SOLUTION INTRAVENOUS at 05:08

## 2021-01-01 RX ADMIN — MEROPENEM 500 MG: 500 INJECTION, POWDER, FOR SOLUTION INTRAVENOUS at 13:44

## 2021-01-01 RX ADMIN — POTASSIUM CHLORIDE 20 MEQ: 200 INJECTION, SOLUTION INTRAVENOUS at 07:53

## 2021-01-01 RX ADMIN — SUCCINYLCHOLINE CHLORIDE 120 MG: 20 INJECTION INTRAMUSCULAR; INTRAVENOUS at 09:20

## 2021-01-01 RX ADMIN — PIPERACILLIN SODIUM AND TAZOBACTAM SODIUM 3.38 G: 3; .375 INJECTION, POWDER, LYOPHILIZED, FOR SOLUTION INTRAVENOUS at 22:28

## 2021-01-01 RX ADMIN — NOREPINEPHRINE-DEXTROSE IV SOLUTION 4 MG/250ML-5% 6 MCG/MIN: 4-5/250 SOLUTION at 11:10

## 2021-01-01 RX ADMIN — SODIUM CHLORIDE 10 ML: 9 INJECTION, SOLUTION INTRAMUSCULAR; INTRAVENOUS; SUBCUTANEOUS at 13:45

## 2021-01-01 RX ADMIN — MEROPENEM 500 MG: 500 INJECTION, POWDER, FOR SOLUTION INTRAVENOUS at 14:07

## 2021-01-01 RX ADMIN — PANTOPRAZOLE SODIUM 40 MG: 40 INJECTION, POWDER, FOR SOLUTION INTRAVENOUS at 20:13

## 2021-01-01 RX ADMIN — NOREPINEPHRINE-DEXTROSE IV SOLUTION 4 MG/250ML-5% 6 MCG/MIN: 4-5/250 SOLUTION at 04:11

## 2021-01-01 RX ADMIN — LACTULOSE 300 ML: 10 SOLUTION ORAL; RECTAL at 21:06

## 2021-01-01 RX ADMIN — NOREPINEPHRINE-DEXTROSE IV SOLUTION 4 MG/250ML-5% 12 MCG/MIN: 4-5/250 SOLUTION at 11:09

## 2021-01-01 RX ADMIN — POTASSIUM PHOSPHATE, MONOBASIC AND POTASSIUM PHOSPHATE, DIBASIC: 224; 236 INJECTION, SOLUTION, CONCENTRATE INTRAVENOUS at 06:13

## 2021-01-01 RX ADMIN — PANTOPRAZOLE SODIUM 40 MG: 40 INJECTION, POWDER, FOR SOLUTION INTRAVENOUS at 21:48

## 2021-01-01 RX ADMIN — SODIUM CHLORIDE 10 ML: 9 INJECTION, SOLUTION INTRAMUSCULAR; INTRAVENOUS; SUBCUTANEOUS at 05:54

## 2021-01-01 RX ADMIN — I.V. FAT EMULSION 250 ML: 20 EMULSION INTRAVENOUS at 17:41

## 2021-01-01 RX ADMIN — OFLOXACIN 1 DROP: 3 SOLUTION/ DROPS OPHTHALMIC at 13:44

## 2021-01-01 RX ADMIN — MEROPENEM 500 MG: 500 INJECTION, POWDER, FOR SOLUTION INTRAVENOUS at 20:33

## 2021-01-01 RX ADMIN — NOREPINEPHRINE-DEXTROSE IV SOLUTION 4 MG/250ML-5% 14 MCG/MIN: 4-5/250 SOLUTION at 17:00

## 2021-01-01 RX ADMIN — PIPERACILLIN SODIUM AND TAZOBACTAM SODIUM 3.38 G: 3; .375 INJECTION, POWDER, LYOPHILIZED, FOR SOLUTION INTRAVENOUS at 21:07

## 2021-01-01 RX ADMIN — OFLOXACIN 1 DROP: 3 SOLUTION/ DROPS OPHTHALMIC at 08:28

## 2021-01-01 RX ADMIN — SODIUM CHLORIDE 10 ML: 9 INJECTION, SOLUTION INTRAMUSCULAR; INTRAVENOUS; SUBCUTANEOUS at 21:44

## 2021-01-01 RX ADMIN — PANTOPRAZOLE SODIUM 40 MG: 40 INJECTION, POWDER, FOR SOLUTION INTRAVENOUS at 21:36

## 2021-01-01 RX ADMIN — MEROPENEM 500 MG: 500 INJECTION, POWDER, FOR SOLUTION INTRAVENOUS at 20:12

## 2021-01-01 RX ADMIN — PIPERACILLIN SODIUM AND TAZOBACTAM SODIUM 3.38 G: 3; .375 INJECTION, POWDER, LYOPHILIZED, FOR SOLUTION INTRAVENOUS at 12:16

## 2021-01-01 RX ADMIN — LACTULOSE 300 ML: 10 SOLUTION ORAL; RECTAL at 21:10

## 2021-01-01 RX ADMIN — THIAMINE HYDROCHLORIDE 100 MG: 100 INJECTION, SOLUTION INTRAMUSCULAR; INTRAVENOUS at 17:48

## 2021-01-01 RX ADMIN — OCTREOTIDE ACETATE 100 MCG: 100 INJECTION, SOLUTION INTRAVENOUS; SUBCUTANEOUS at 15:34

## 2021-01-01 RX ADMIN — INSULIN HUMAN 4 UNITS: 100 INJECTION, SOLUTION PARENTERAL at 09:11

## 2021-01-01 RX ADMIN — HALOPERIDOL LACTATE 2 MG: 5 INJECTION, SOLUTION INTRAMUSCULAR at 06:05

## 2021-01-01 RX ADMIN — NOREPINEPHRINE-DEXTROSE IV SOLUTION 4 MG/250ML-5% 14 MCG/MIN: 4-5/250 SOLUTION at 01:00

## 2021-01-01 RX ADMIN — PANTOPRAZOLE SODIUM 40 MG: 40 INJECTION, POWDER, FOR SOLUTION INTRAVENOUS at 08:49

## 2021-01-01 RX ADMIN — NOREPINEPHRINE-DEXTROSE IV SOLUTION 4 MG/250ML-5% 16 MCG/MIN: 4-5/250 SOLUTION at 06:55

## 2021-01-01 RX ADMIN — SODIUM CHLORIDE 0.4 MCG/KG/HR: 9 INJECTION, SOLUTION INTRAVENOUS at 17:54

## 2021-01-01 RX ADMIN — FENTANYL CITRATE 50 MCG/HR: 0.05 INJECTION, SOLUTION INTRAMUSCULAR; INTRAVENOUS at 17:59

## 2021-01-01 RX ADMIN — NOREPINEPHRINE-DEXTROSE IV SOLUTION 4 MG/250ML-5% 7 MCG/MIN: 4-5/250 SOLUTION at 03:15

## 2021-01-01 RX ADMIN — FENTANYL CITRATE 25 MCG/HR: 0.05 INJECTION, SOLUTION INTRAMUSCULAR; INTRAVENOUS at 12:28

## 2021-01-01 RX ADMIN — POTASSIUM CHLORIDE 20 MEQ: 200 INJECTION, SOLUTION INTRAVENOUS at 11:42

## 2021-01-01 RX ADMIN — GLYCOPYRROLATE 0.1 MG: 0.2 INJECTION, SOLUTION INTRAMUSCULAR; INTRAVENOUS at 11:34

## 2021-01-01 RX ADMIN — LORAZEPAM 1 MG: 2 INJECTION INTRAMUSCULAR; INTRAVENOUS at 13:12

## 2021-01-01 RX ADMIN — PANTOPRAZOLE SODIUM 40 MG: 40 INJECTION, POWDER, FOR SOLUTION INTRAVENOUS at 08:39

## 2021-01-01 RX ADMIN — LACTULOSE 300 ML: 10 SOLUTION ORAL; RECTAL at 22:00

## 2021-01-01 RX ADMIN — SODIUM CHLORIDE 10 ML: 9 INJECTION, SOLUTION INTRAMUSCULAR; INTRAVENOUS; SUBCUTANEOUS at 05:32

## 2021-01-01 RX ADMIN — NOREPINEPHRINE-DEXTROSE IV SOLUTION 4 MG/250ML-5% 6 MCG/MIN: 4-5/250 SOLUTION at 13:09

## 2021-01-01 RX ADMIN — CEFTRIAXONE SODIUM 1 G: 1 INJECTION, POWDER, FOR SOLUTION INTRAMUSCULAR; INTRAVENOUS at 15:31

## 2021-01-01 RX ADMIN — ONDANSETRON 4 MG: 2 INJECTION INTRAMUSCULAR; INTRAVENOUS at 15:28

## 2021-01-01 RX ADMIN — POTASSIUM CHLORIDE 20 MEQ: 200 INJECTION, SOLUTION INTRAVENOUS at 05:27

## 2021-01-01 RX ADMIN — LACTULOSE 300 ML: 10 SOLUTION ORAL; RECTAL at 17:18

## 2021-01-01 RX ADMIN — PANTOPRAZOLE SODIUM 40 MG: 40 INJECTION, POWDER, FOR SOLUTION INTRAVENOUS at 20:05

## 2021-01-01 RX ADMIN — SODIUM CHLORIDE 10 ML: 9 INJECTION, SOLUTION INTRAMUSCULAR; INTRAVENOUS; SUBCUTANEOUS at 21:37

## 2021-01-01 RX ADMIN — HALOPERIDOL LACTATE 2 MG: 5 INJECTION, SOLUTION INTRAMUSCULAR at 08:17

## 2021-01-01 RX ADMIN — ALBUMIN (HUMAN) 25 G: 12.5 INJECTION, SOLUTION INTRAVENOUS at 09:20

## 2021-01-01 RX ADMIN — NOREPINEPHRINE-DEXTROSE IV SOLUTION 4 MG/250ML-5% 18 MCG/MIN: 4-5/250 SOLUTION at 03:56

## 2021-01-01 RX ADMIN — LACTULOSE 300 ML: 10 SOLUTION ORAL; RECTAL at 22:06

## 2021-01-01 RX ADMIN — ALBUMIN (HUMAN) 25 G: 12.5 INJECTION, SOLUTION INTRAVENOUS at 20:06

## 2021-01-01 RX ADMIN — PIPERACILLIN SODIUM AND TAZOBACTAM SODIUM 3.38 G: 3; .375 INJECTION, POWDER, LYOPHILIZED, FOR SOLUTION INTRAVENOUS at 05:17

## 2021-01-01 RX ADMIN — SODIUM CHLORIDE 40 ML: 9 INJECTION, SOLUTION INTRAMUSCULAR; INTRAVENOUS; SUBCUTANEOUS at 14:00

## 2021-01-01 RX ADMIN — HUMAN INSULIN 2 UNITS: 100 INJECTION, SOLUTION SUBCUTANEOUS at 14:59

## 2021-01-01 RX ADMIN — LORAZEPAM 2 MG: 2 INJECTION INTRAMUSCULAR; INTRAVENOUS at 12:21

## 2021-01-01 RX ADMIN — PANTOPRAZOLE SODIUM 40 MG: 40 INJECTION, POWDER, FOR SOLUTION INTRAVENOUS at 15:32

## 2021-01-01 RX ADMIN — PANTOPRAZOLE SODIUM 40 MG: 40 INJECTION, POWDER, FOR SOLUTION INTRAVENOUS at 21:31

## 2021-01-01 RX ADMIN — MAGNESIUM SULFATE HEPTAHYDRATE: 500 INJECTION, SOLUTION INTRAMUSCULAR; INTRAVENOUS at 17:14

## 2021-01-01 RX ADMIN — PIPERACILLIN SODIUM AND TAZOBACTAM SODIUM 3.38 G: 3; .375 INJECTION, POWDER, LYOPHILIZED, FOR SOLUTION INTRAVENOUS at 05:54

## 2021-01-01 RX ADMIN — DEXTROSE MONOHYDRATE 18 MCG/MIN: 50 INJECTION, SOLUTION INTRAVENOUS at 08:08

## 2021-01-01 RX ADMIN — POTASSIUM CHLORIDE 20 MEQ: 200 INJECTION, SOLUTION INTRAVENOUS at 07:36

## 2021-01-01 RX ADMIN — I.V. FAT EMULSION 250 ML: 20 EMULSION INTRAVENOUS at 17:14

## 2021-01-01 RX ADMIN — PANTOPRAZOLE SODIUM 40 MG: 40 INJECTION, POWDER, FOR SOLUTION INTRAVENOUS at 08:46

## 2021-01-01 RX ADMIN — PHYTONADIONE 10 MG: 10 INJECTION, EMULSION INTRAMUSCULAR; INTRAVENOUS; SUBCUTANEOUS at 20:45

## 2021-01-01 RX ADMIN — OFLOXACIN 1 DROP: 3 SOLUTION/ DROPS OPHTHALMIC at 08:16

## 2021-01-01 RX ADMIN — OFLOXACIN 1 DROP: 3 SOLUTION/ DROPS OPHTHALMIC at 12:32

## 2021-01-01 RX ADMIN — SODIUM CHLORIDE 10 ML: 9 INJECTION, SOLUTION INTRAMUSCULAR; INTRAVENOUS; SUBCUTANEOUS at 06:00

## 2021-01-01 RX ADMIN — SODIUM CHLORIDE 0.8 MCG/KG/HR: 9 INJECTION, SOLUTION INTRAVENOUS at 09:36

## 2021-01-01 RX ADMIN — METOCLOPRAMIDE 5 MG: 5 INJECTION, SOLUTION INTRAMUSCULAR; INTRAVENOUS at 17:08

## 2021-01-01 RX ADMIN — OFLOXACIN 1 DROP: 3 SOLUTION/ DROPS OPHTHALMIC at 18:33

## 2021-01-01 RX ADMIN — OFLOXACIN 1 DROP: 3 SOLUTION/ DROPS OPHTHALMIC at 22:10

## 2021-01-01 RX ADMIN — VANCOMYCIN HYDROCHLORIDE 1000 MG: 1 INJECTION, POWDER, LYOPHILIZED, FOR SOLUTION INTRAVENOUS at 08:47

## 2021-01-01 RX ADMIN — ALBUMIN (HUMAN) 25 G: 12.5 INJECTION, SOLUTION INTRAVENOUS at 21:50

## 2021-01-01 RX ADMIN — CARBOXYMETHYLCELLULOSE SODIUM 1 DROP: 10 GEL OPHTHALMIC at 22:05

## 2021-01-01 RX ADMIN — NOREPINEPHRINE-DEXTROSE IV SOLUTION 4 MG/250ML-5% 10 MCG/MIN: 4-5/250 SOLUTION at 12:05

## 2021-01-01 RX ADMIN — SODIUM CHLORIDE 10 ML: 9 INJECTION, SOLUTION INTRAMUSCULAR; INTRAVENOUS; SUBCUTANEOUS at 13:18

## 2021-01-01 RX ADMIN — ALBUMIN (HUMAN) 25 G: 12.5 INJECTION, SOLUTION INTRAVENOUS at 21:31

## 2021-01-01 RX ADMIN — PIPERACILLIN SODIUM AND TAZOBACTAM SODIUM 3.38 G: 3; .375 INJECTION, POWDER, LYOPHILIZED, FOR SOLUTION INTRAVENOUS at 13:18

## 2021-01-01 RX ADMIN — DEXTROSE MONOHYDRATE AND SODIUM CHLORIDE 50 ML/HR: 5; .9 INJECTION, SOLUTION INTRAVENOUS at 17:57

## 2021-01-01 RX ADMIN — OCTREOTIDE ACETATE 50 MCG/HR: 500 INJECTION, SOLUTION INTRAVENOUS; SUBCUTANEOUS at 23:30

## 2021-01-01 RX ADMIN — LORAZEPAM 2 MG: 2 INJECTION INTRAMUSCULAR at 16:22

## 2021-01-01 RX ADMIN — OFLOXACIN 1 DROP: 3 SOLUTION/ DROPS OPHTHALMIC at 08:18

## 2021-01-01 RX ADMIN — OFLOXACIN 1 DROP: 3 SOLUTION/ DROPS OPHTHALMIC at 18:11

## 2021-01-01 RX ADMIN — OFLOXACIN 1 DROP: 3 SOLUTION/ DROPS OPHTHALMIC at 21:44

## 2021-01-01 RX ADMIN — MEROPENEM 500 MG: 500 INJECTION, POWDER, FOR SOLUTION INTRAVENOUS at 08:26

## 2021-01-01 RX ADMIN — PANTOPRAZOLE SODIUM 40 MG: 40 INJECTION, POWDER, FOR SOLUTION INTRAVENOUS at 08:17

## 2021-01-01 RX ADMIN — I.V. FAT EMULSION 250 ML: 20 EMULSION INTRAVENOUS at 18:33

## 2021-01-01 RX ADMIN — ALBUMIN (HUMAN) 25 G: 12.5 INJECTION, SOLUTION INTRAVENOUS at 10:21

## 2021-01-01 RX ADMIN — MAGNESIUM SULFATE HEPTAHYDRATE: 500 INJECTION, SOLUTION INTRAMUSCULAR; INTRAVENOUS at 17:40

## 2021-01-01 RX ADMIN — PANTOPRAZOLE SODIUM 40 MG: 40 INJECTION, POWDER, FOR SOLUTION INTRAVENOUS at 21:07

## 2021-01-01 RX ADMIN — PROPOFOL 10 MCG/KG/MIN: 10 INJECTION, EMULSION INTRAVENOUS at 03:57

## 2021-01-01 RX ADMIN — NOREPINEPHRINE-DEXTROSE IV SOLUTION 4 MG/250ML-5% 12 MCG/MIN: 4-5/250 SOLUTION at 23:15

## 2021-01-01 RX ADMIN — PIPERACILLIN SODIUM AND TAZOBACTAM SODIUM 3.38 G: 3; .375 INJECTION, POWDER, LYOPHILIZED, FOR SOLUTION INTRAVENOUS at 04:10

## 2021-01-01 RX ADMIN — DEXTROSE MONOHYDRATE 42 ML/HR: 10 INJECTION, SOLUTION INTRAVENOUS at 19:52

## 2021-01-01 RX ADMIN — HUMAN INSULIN 2 UNITS: 100 INJECTION, SOLUTION SUBCUTANEOUS at 21:00

## 2021-01-01 RX ADMIN — SODIUM CHLORIDE 0.9 MCG/KG/HR: 9 INJECTION, SOLUTION INTRAVENOUS at 02:16

## 2021-01-01 RX ADMIN — SODIUM CHLORIDE 10 ML: 9 INJECTION, SOLUTION INTRAMUSCULAR; INTRAVENOUS; SUBCUTANEOUS at 05:33

## 2021-01-01 RX ADMIN — VANCOMYCIN HYDROCHLORIDE 1000 MG: 1 INJECTION, POWDER, LYOPHILIZED, FOR SOLUTION INTRAVENOUS at 08:32

## 2021-01-01 RX ADMIN — PROMETHAZINE HYDROCHLORIDE 12.5 MG: 25 INJECTION INTRAMUSCULAR; INTRAVENOUS at 15:48

## 2021-01-01 RX ADMIN — PANTOPRAZOLE SODIUM 40 MG: 40 INJECTION, POWDER, FOR SOLUTION INTRAVENOUS at 09:01

## 2021-01-01 RX ADMIN — ALBUTEROL SULFATE 2.5 MG: 2.5 SOLUTION RESPIRATORY (INHALATION) at 20:21

## 2021-01-01 RX ADMIN — ALBUTEROL SULFATE 2.5 MG: 2.5 SOLUTION RESPIRATORY (INHALATION) at 08:32

## 2021-01-01 RX ADMIN — OCTREOTIDE ACETATE 50 MCG/HR: 500 INJECTION, SOLUTION INTRAVENOUS; SUBCUTANEOUS at 15:41

## 2021-01-01 RX ADMIN — NOREPINEPHRINE-DEXTROSE IV SOLUTION 4 MG/250ML-5% 8 MCG/MIN: 4-5/250 SOLUTION at 21:35

## 2021-01-01 RX ADMIN — SODIUM CHLORIDE 10 ML: 9 INJECTION, SOLUTION INTRAMUSCULAR; INTRAVENOUS; SUBCUTANEOUS at 05:09

## 2021-01-01 RX ADMIN — VANCOMYCIN HYDROCHLORIDE 1500 MG: 10 INJECTION, POWDER, LYOPHILIZED, FOR SOLUTION INTRAVENOUS at 21:07

## 2021-01-01 RX ADMIN — LORAZEPAM 2 MG: 2 INJECTION INTRAMUSCULAR; INTRAVENOUS at 16:22

## 2021-01-01 RX ADMIN — ALBUMIN (HUMAN) 25 G: 12.5 INJECTION, SOLUTION INTRAVENOUS at 10:43

## 2021-01-01 RX ADMIN — NOREPINEPHRINE-DEXTROSE IV SOLUTION 4 MG/250ML-5% 14 MCG/MIN: 4-5/250 SOLUTION at 02:00

## 2021-02-06 PROBLEM — D64.9 ANEMIA: Status: ACTIVE | Noted: 2021-01-01

## 2021-02-06 PROBLEM — R57.8 HEMORRHAGIC SHOCK (HCC): Status: ACTIVE | Noted: 2021-01-01

## 2021-02-06 PROBLEM — K22.89 ESOPHAGEAL BLEEDING: Status: ACTIVE | Noted: 2021-01-01

## 2021-02-06 PROBLEM — K92.0 HEMATEMESIS WITH NAUSEA: Status: ACTIVE | Noted: 2021-01-01

## 2021-02-06 PROBLEM — Z99.11 ENCOUNTER FOR WEANING FROM VENTILATOR (HCC): Status: ACTIVE | Noted: 2021-01-01

## 2021-02-06 PROBLEM — E43 SEVERE PROTEIN-CALORIE MALNUTRITION (HCC): Status: ACTIVE | Noted: 2021-01-01

## 2021-02-06 NOTE — Clinical Note
Status[de-identified] INPATIENT [101]   Type of Bed: Intensive Care [6]   Inpatient Hospitalization Certified Necessary for the Following Reasons: 9.  Other (further clarification in H&P documentation)   Admitting Diagnosis: Hemorrhagic shock [facility]) [[id number]]   Admitting Diagnosis: Esophageal bleeding [[id number]]   Admitting Physician: Renee Encinas [[id number]]   Attending Physician: Kvng Rendon   Estimated Length of Stay: 2 Midnights   Discharge Plan[de-identified] Home with Office Follow-up

## 2021-02-06 NOTE — ANESTHESIA PREPROCEDURE EVALUATION
Relevant Problems No relevant active problems Anesthetic History PONV Review of Systems / Medical History Patient summary reviewed, nursing notes reviewed and pertinent labs reviewed Pulmonary Neuro/Psych Cardiovascular Exercise tolerance: >4 METS 
  
GI/Hepatic/Renal 
  
 
 
 
Liver disease Endo/Other Other Findings Comments: Coffee ground emesis with anemia Physical Exam 
 
Airway TM Distance: 4 - 6 cm Neck ROM: normal range of motion Mouth opening: Normal 
 
 Cardiovascular Regular rate and rhythm,  S1 and S2 normal,  no murmur, click, rub, or gallop Dental 
No notable dental hx Pulmonary Breath sounds clear to auscultation Abdominal 
 
 
 
 Other Findings Anesthetic Plan ASA: 4, emergent Anesthesia type: general 
 
 
 
 
Induction: RSI

## 2021-02-06 NOTE — PROCEDURES
Esophagogastroduodenoscopy    DATE of PROCEDURE: 2/6/2021    MEDICATIONS ADMINISTERED: MAC    INSTRUMENT: GIF    PROCEDURE:  After obtaining informed consent, the patient was placed in the left lateral position and sedated. The endoscope was advanced under direct vision without difficulty. The esophagus, stomach (including retroflexed views) and duodenum were evaluated. The patient was taken to the recovery area in stable condition. FINDINGS:    OROPHARYNX: Cords and pyriform recesses normal.    ESOPHAGUS: The entire esophagus appeared abnormal. It was diffusely ulcerated given its yellowish hue and some of this appeared necrotic. Diffuse bleeding was seen. One identified focus of bleeding was identified and controlled with APC. However, shortly thereafter the esophagus began to bleed from multiple locations. The tissue was so friable that clipping, banding, or gold probe were not likely to be effective. As such I elected to use hemospray. The entire length the of the esophagus was coated with the material.  Hemostasis appeared to be achieved although given the amount of spray applied it was difficult to see. This does not appear to be a variceal bleed. STOMACH: The fundus on antegrade and retroflex views is normal. The body, antrum and pylorus are normal.  Old blood was seen. No gastric varies were able to be seen, although examination was somewhat limited given the amount of blood in the stomach. DUODENUM: The bulb and second portions are normal.     Estimated blood loss: 0-minimal     PLAN:  1. Transfuse   2. Continue ppi, octreortide  3. Supportive care  4.  Will follow      Tariq Mckeon MD  [facility], [id number]

## 2021-02-06 NOTE — ED PROVIDER NOTES
61 yo M with past medical history of alcoholic cirrhosis presents via EMS from home with coffee-ground emesis over the past 3 to 4 days. Reports recent nausea, vomiting. Patient states that he got up to use the bathroom today and was unsteady and fell. Patient was reportedly on the floor for about an hour before the son came home to help him. Denies loss of consciousness. Patient states that he recently stopped drinking around a month ago. Patient denies chest pain, shortness of breath, abdominal pain, fever, chills, dysuria, hematuria, focal weakness, numbness, tingling. Reports recent dark-colored stools. Denies being on any anticoagulation. The history is provided by the patient. No  was used. Vomiting This is a new problem. The current episode started more than 2 days ago. The problem occurs 2 to 4 times per day. The problem has not changed since onset. The emesis has an appearance of coffee grounds. There has been no fever. Pertinent negatives include no chills, no fever, no sweats, no abdominal pain, no diarrhea, no headaches, no arthralgias, no myalgias, no cough, no URI and no headaches. Past Medical History:  
Diagnosis Date  Cirrhosis ([facility] [address] Past Surgical History:  
Procedure Laterality Date  IR PARACENTESIS ABD W IMAGE  12/11/2020 No family history on file. Social History Socioeconomic History  Marital status:  Spouse name: Not on file  Number of children: Not on file  Years of education: Not on file  Highest education level: Not on file Occupational History  Not on file Social Needs  Financial resource strain: Not on file  Food insecurity Worry: Not on file Inability: Not on file  Transportation needs Medical: Not on file Non-medical: Not on file Tobacco Use  Smoking status: Not on file Substance and Sexual Activity  Alcohol use: Not on file  Drug use: Not on file  Sexual activity: Not on file Lifestyle  Physical activity Days per week: Not on file Minutes per session: Not on file  Stress: Not on file Relationships  Social connections Talks on phone: Not on file Gets together: Not on file Attends [employer] service: Not on file Active member of club or organization: Not on file Attends meetings of clubs or organizations: Not on file Relationship status: Not on file  Intimate partner violence Fear of current or ex partner: Not on file Emotionally abused: Not on file Physically abused: Not on file Forced sexual activity: Not on file Other Topics Concern  Not on file Social History Narrative  Not on file ALLERGIES: Patient has no known allergies. Review of Systems Constitutional: Positive for fatigue. Negative for chills and fever. HENT: Negative for congestion and sore throat. Respiratory: Negative for cough and shortness of breath. Cardiovascular: Negative for chest pain. Gastrointestinal: Positive for blood in stool, nausea and vomiting. Negative for abdominal pain, constipation and diarrhea. Genitourinary: Negative for dysuria and flank pain. Musculoskeletal: Negative for arthralgias and myalgias. Skin: Positive for pallor. Neurological: Positive for light-headedness. Negative for speech difficulty, weakness and headaches. Vitals:  
 02/06/21 1504 BP: (!) 81/56 Pulse: (!) 111 Resp: 16 Temp: 97.7 °F (36.5 °C) SpO2: 94% Weight: 81.6 kg (180 lb) Height: 6' 7\" (2.007 m) Physical Exam 
Vitals signs and nursing note reviewed. Exam conducted with a chaperone present. Constitutional:   
   Comments: Ill-appearing. HENT:  
   Head: Normocephalic. Comments: No findings consistent with basilar skull fracture. Mouth/Throat:  
   Mouth: Mucous membranes are dry. Eyes:  
   Pupils: Pupils are equal, round, and reactive to light. Comments: Left corneal ulceration noted. Neck: Musculoskeletal: Normal range of motion. Comments: No midline Cspine. No step-off. Cardiovascular:  
   Rate and Rhythm: Regular rhythm. Tachycardia present. Pulses: Normal pulses. Heart sounds: Normal heart sounds. Comments: Pulses 2+ and equal throughout. Pulmonary:  
   Effort: Pulmonary effort is normal.  
   Breath sounds: Normal breath sounds. Comments: CTAB. Abdominal:  
   General: There is distension. Palpations: Abdomen is soft. Comments: Distended abdomen with ascites. No rebound or guarding. No peritoneal signs. Genitourinary: 
   Cesar Pereira RN present. Rectal exam with dark brown stool. Hemoccult positive. Musculoskeletal: Normal range of motion. Skin: 
   Coloration: Skin is pale. Neurological:  
   General: No focal deficit present. Mental Status: He is alert and oriented to person, place, and time. Motor: No weakness. MDM Number of Diagnoses or Management Options Acute respiratory failure with hypoxia ([address] Coffee ground emesis: new and requires workup Symptomatic anemia UGIB (upper gastrointestinal bleed): new and requires workup Diagnosis management comments: Patient hypotensive, tachycardic on arrival. 
Patient with episode of coffee-ground emesis while present in room. Significant concern for variceal bleed given history of cirrhosis. Orders placed for Protonix, Zofran, IV fluid bolus, Rocephin 1 g IV, octreotide bolus and infusion. GI consulted. Lina on call. States he needs labs back before deciding whether or not to take for EGD. Informed that patient was critically ill with likely variceal bleed.  
======================================================== 
3:44 pm 
Perri Hou (APRN) with GI present at bedside. Hb 8.7. Orders placed for 2 units PRBCs.   
Blood bank contacted. 
========================================================== 
 Patient with several episodes of hematemesis. Patient slightly more altered and noted to be hypoxic requiring nonrebreather mask. Decision made to intubate. Copious amounts of dark blood present in airway. Was able to suction out contents and placed 7.5 ET tube. Patient given etomidate and rocuronium. Precedex and fentanyl ordered for sedation. CXR ordered to confirm placement,. GI contacted w/ update. Asked if they would like Cecilia Seymour NG tube placed at this time. States that we can hold off on placing at this time as they are going to be taking for emergent EGD shortly and are waiting on anesthesia. ICU consulted for admission given patient intubated. Spoke with Dr. Wilfrid Diaz in regards to admission to ICU. Amount and/or Complexity of Data Reviewed Clinical lab tests: ordered and reviewed Tests in the radiology section of CPT®: ordered and reviewed Tests in the medicine section of CPT®: ordered and reviewed Obtain history from someone other than the patient: yes Review and summarize past medical records: yes Discuss the patient with other providers: yes Independent visualization of images, tracings, or specimens: yes Risk of Complications, Morbidity, and/or Mortality Presenting problems: high Diagnostic procedures: high Management options: high General comments: Patient later states that he may have hit his head on the bedside table. At this time no concern for his likely variceal bleed. Patient is unstable and unable to be taken to CT at this time. Patient Progress Patient progress: other (comment) (Guarded ) 
 
ED Course as of Feb 06 1534 Sat Feb 06, 2021  
1532 GI consulted. Awaiting callback. [DF] ED Course User Index 
[DF] Karri Iqbal MD  
 
 
EKG Date/Time: 2/6/2021 3:38 PM 
Performed by: Karri Iqbal MD 
Authorized by: Karri Iqbal MD  
 
ECG reviewed by ED Physician in the absence of a cardiologist: yes Rate: ECG rate:  111 ECG rate assessment: tachycardic Rhythm:  
  Rhythm: sinus tachycardia Ectopy:  
  Ectopy: none QRS:  
  QRS axis:  Normal 
  QRS intervals:  Normal 
Conduction:  
  Conduction: normal   
ST segments: ST segments:  Normal 
T waves:  
  T waves: normal   
Critical Care Performed by: Samaria Bhakta MD 
Authorized by: Samaria Bhakta MD  
 
Critical care provider statement:  
  Critical care time (minutes):  50 
  Critical care time was exclusive of:  Separately billable procedures and treating other patients Critical care was necessary to treat or prevent imminent or life-threatening deterioration of the following conditions:  Cardiac failure, circulatory failure, CNS failure or compromise, dehydration, endocrine crisis, hepatic failure, metabolic crisis, shock, sepsis, respiratory failure and renal failure Critical care was time spent personally by me on the following activities:  Evaluation of patient's response to treatment, blood draw for specimens, discussions with primary provider, discussions with consultants, development of treatment plan with patient or surrogate, examination of patient, obtaining history from patient or surrogate, ordering and performing treatments and interventions, ordering and review of laboratory studies, pulse oximetry, ordering and review of radiographic studies, re-evaluation of patient's condition and review of old charts I assumed direction of critical care for this patient from another provider in my specialty: yes Comments:  
   Pt critically ill with UGIB likely variceal bleed. Pt hypotensive, tachycardic. PRBCs ordered. Contacting lab for emergent release blood. GI consulted. Pt will need emergent EGD. Intubation Date/Time: 2/6/2021 5:37 PM 
Performed by: Samaria Bhakta MD 
Authorized by: Samaria Bhakta MD  
 
Consent:  
  Consent obtained:  Emergent situation Pre-procedure details:  
  Patient status:  Altered mental status Mallampati score: I Pretreatment meds: etomidate 20 mg  
  Paralytics:  Rocuronium (80 mg Brett) Procedure details:  
  Preoxygenation:  Nonrebreather mask CPR in progress: no Intubation method:  Oral 
  Oral intubation technique:  Video-assisted Laryngoscope blade: Mac 3 Tube size (mm):  7.5 Tube type:  Cuffed Number of attempts:  1 Tube visualized through cords: yes Placement assessment: ETT to lip:  25 Breath sounds:  Equal and absent over the epigastrium Placement verification: chest rise, condensation, CXR verification, direct visualization, equal breath sounds and ETCO2 detector Post-procedure details:  
  Patient tolerance of procedure: Tolerated well, no immediate complications Results Include: 
 
Recent Results (from the past 24 hour(s)) CBC WITH AUTOMATED DIFF Collection Time: 02/06/21  3:09 PM  
Result Value Ref Range WBC 16.5 (H) 4.3 - 11.1 K/uL  
 RBC 2.37 (L) 4.23 - 5.6 M/uL HGB 8.7 (L) 13.6 - 17.2 g/dL HCT 26.3 (L) 41.1 - 50.3 % .0 (H) 79.6 - 97.8 FL  
 MCH 36.7 (H) 26.1 - 32.9 PG  
 MCHC 33.1 31.4 - 35.0 g/dL  
 RDW 13.5 11.9 - 14.6 % PLATELET 887 (L) 435 - 450 K/uL MPV 11.3 9.4 - 12.3 FL ABSOLUTE NRBC 0.00 0.0 - 0.2 K/uL NEUTROPHILS 81 (H) 43 - 78 % LYMPHOCYTES 10 (L) 13 - 44 % MONOCYTES 8 4.0 - 12.0 % EOSINOPHILS 0 (L) 0.5 - 7.8 % BASOPHILS 0 0.0 - 2.0 % IMMATURE GRANULOCYTES 1 0.0 - 5.0 %  
 ABS. NEUTROPHILS 13.3 (H) 1.7 - 8.2 K/UL  
 ABS. LYMPHOCYTES 1.7 0.5 - 4.6 K/UL  
 ABS. MONOCYTES 1.3 0.1 - 1.3 K/UL  
 ABS. EOSINOPHILS 0.0 0.0 - 0.8 K/UL  
 ABS. BASOPHILS 0.0 0.0 - 0.2 K/UL  
 ABS. IMM. GRANS. 0.2 0.0 - 0.5 K/UL  
 RBC COMMENTS NORMOCYTIC/NORMOCHROMIC    
 WBC COMMENTS Result Confirmed By Smear PLATELET COMMENTS ADEQUATE    
 DF AUTOMATED METABOLIC PANEL, COMPREHENSIVE  Collection Time: 02/06/21  3:09 PM  
 Result Value Ref Range Sodium 135 (L) 138 - 145 mmol/L Potassium 4.1 3.5 - 5.1 mmol/L Chloride 98 98 - 107 mmol/L  
 CO2 27 21 - 32 mmol/L Anion gap 10 7 - 16 mmol/L Glucose 88 65 - 100 mg/dL BUN 33 (H) 8 - 23 MG/DL Creatinine 1.24 0.8 - 1.5 MG/DL  
 GFR est AA >60 >60 ml/min/1.73m2 GFR est non-AA >60 >60 ml/min/1.73m2 Calcium 8.2 (L) 8.3 - 10.4 MG/DL Bilirubin, total 1.7 (H) 0.2 - 1.1 MG/DL  
 ALT (SGPT) 24 12 - 65 U/L  
 AST (SGOT) 46 (H) 15 - 37 U/L Alk. phosphatase 65 50 - 136 U/L Protein, total 5.0 (L) 6.3 - 8.2 g/dL Albumin 1.7 (L) 3.2 - 4.6 g/dL Globulin 3.3 2.3 - 3.5 g/dL A-G Ratio 0.5 (L) 1.2 - 3.5 TYPE & SCREEN Collection Time: 02/06/21  3:09 PM  
Result Value Ref Range Crossmatch Expiration 02/09/2021,2359 ABO/Rh(D) O POSITIVE Antibody screen NEG Comment KEEP 2 AHEAD Unit number [id number] Blood component type [facility] Unit division 00 Status of unit ALLOCATED Crossmatch result Compatible Unit number [id number] Blood component type [facility] Unit division 00 Status of unit ALLOCATED Crossmatch result Compatible Unit number [id number] Blood component type [facility] Unit division 00 Status of unit ISSUED Crossmatch result Compatible Unit number [id number] Blood component type [facility] Unit division 00 Status of unit ISSUED Crossmatch result Compatible LIPASE Collection Time: 02/06/21  3:09 PM  
Result Value Ref Range Lipase 72 (L) 73 - 393 U/L  
PROTHROMBIN TIME + INR Collection Time: 02/06/21  3:09 PM  
Result Value Ref Range Prothrombin time 21.8 (H) 12.5 - 14.7 sec INR 1.9 PTT Collection Time: 02/06/21  3:09 PM  
Result Value Ref Range aPTT 27.4 24.1 - 35.1 SEC  
ETHYL ALCOHOL Collection Time: 02/06/21  3:09 PM  
Result Value Ref Range ALCOHOL(ETHYL),SERUM <3 MG/DL MAGNESIUM  Collection Time: 02/06/21  3:09 PM  
 Result Value Ref Range Magnesium 2.1 1.8 - 2.4 mg/dL CK Collection Time: 02/06/21  3:09 PM  
Result Value Ref Range CK 87 21 - 215 U/L  
IRON Collection Time: 02/06/21  3:09 PM  
Result Value Ref Range Iron 90 35 - 150 ug/dL VITAMIN B12 Collection Time: 02/06/21  3:09 PM  
Result Value Ref Range Vitamin B12 1,194 (H) 193 - 986 pg/mL FOLATE Collection Time: 02/06/21  3:09 PM  
Result Value Ref Range Folate 7.1 3.1 - 17.5 ng/mL EKG, 12 LEAD, INITIAL Collection Time: 02/06/21  3:31 PM  
Result Value Ref Range Ventricular Rate 111 BPM  
 Atrial Rate 220 BPM  
 QRS Duration 80 ms  
 Q-T Interval 330 ms QTC Calculation (Bezet) 448 ms Calculated R Axis 52 degrees Calculated T Axis 82 degrees Diagnosis    
  !! AGE AND GENDER SPECIFIC ECG ANALYSIS !! Accelerated Junctional rhythm with occasional Premature ventricular complexes Cannot rule out Inferior infarct , age undetermined Abnormal ECG When compared with ECG of 16-OCT-2020 13:07, 
Junctional rhythm has replaced Sinus rhythm Minimal criteria for Inferior infarct are now Present Nonspecific T wave abnormality now evident in Inferior leads Nonspecific T wave abnormality now evident in Anterolateral leads AMMONIA Collection Time: 02/06/21  3:55 PM  
Result Value Ref Range Ammonia 55 (H) 11 - 32 UMOL/L  
RBC, ALLOCATE Collection Time: 02/06/21  4:00 PM  
Result Value Ref Range HISTORY CHECKED? Historical check performed Terris Fleischer., MD; 2/6/2021 @3:36 PM Voice dictation software was used during the making of this note. This software is not perfect and grammatical and other typographical errors may be present.   This note has not been proofread for errors. 
===================================================================

## 2021-02-06 NOTE — ED TRIAGE NOTES
Pt arrives via EMS from home with n/v x 3 days. Coffee ground emesis getting progressively worse. Today got up to go to the bathroom and was unsteady and fell. Was on floor for about an hour before son came home and helped him and decided to come in. VSS with tachycardia in route. Pt states recently stopped drinking. Pt reports hx of liver issues.

## 2021-02-06 NOTE — CONSULTS
[facility] Consult Note       Primary GI Physician:  Dr Dillon Kelly    Referring Provider:  ER    Consult Date:  2/6/2021    Admit Date:  2/6/2021    Chief Complaint:  Hematemesis    Subjective:     History of Present Illness:  Patient is a 60 y.o. male with PMH of etoh abuse, seen in consultation at the request of Dr. Morena Schaeffer for evaluation of hematemesis. He presented to the ED today for complaints of a 3 day hx of dark emesis. He reports falling today, found down by family, and transported to the ED. He is hypotensive with BP 80/42 and tachycardia at 106. Labs with WBC 16.5, hgb 8.7,hct 26.3, and platelet ct 577. INR and chemistry pending. Patient is accompanied by his son who assists with hx. He stopped etoh about 2 months ago after years of a case of beer daily. He reports only occasional nsaid use but has been taking Pepto Bismol and TUMS frequently. He has had increased abdominal swelling but no edema to hands or legs. Urine has been dark but he denies jaundice or icterus. He was seen here in 12/2020, HCV +, with ultrasound which revealed gallbladder sludge, no CBD obstruction, and cirrhotic appearance of liver with ascites. Flow in portal vein could not be determined. [facility] 12/10/2021  FINDINGS:  Liver: Mildly enlarged liver measuring 18.6 cm. Increased parenchymal  echogenicity. Coarse echotexture. No flow detected in the portal vein. Ascites.   Biliary: There is gallbladder sludge or fine layering gallstones. No shadowing  gallstones demonstrated. The extrahepatic biliary duct measures 2.7 mm. There is  thickening of the gallbladder wall which may be pseudothickening due to the  ascites.   Pancreas: The pancreas is normal.     Right kidney: The kidney demonstrate normal echogenicity with no mass, stone or  hydronephrosis. The right kidney measures 13.6 cm in length.    Aorta/IVC: Not visualized. IMPRESSION:    Gallbladder sludge or fine nonshadowing gallstones.  No evidence of biliary duct  obstruction.   Cirrhotic morphology to the liver. Ascites. Flow in the portal vein could not be  confirmed. PMH:  Past Medical History:   Diagnosis Date    Cirrhosis ([address]        PSH:  Past Surgical History:   Procedure Laterality Date    IR PARACENTESIS ABD W IMAGE  12/11/2020       Allergies:  No Known Allergies    Home Medications:  Prior to Admission medications    Medication Sig Start Date End Date Taking? Authorizing Provider   LORazepam (ATIVAN) 1 mg tablet Take 1 Tab by mouth every eight (8) hours as needed (alcohol withdrawal). Max Daily Amount: 3 mg. 12/12/20   Enrike Palomino Stacks, DO       Hospital Medications:  Current Facility-Administered Medications   Medication Dose Route Frequency    cefTRIAXone (ROCEPHIN) 1 g in 0.9% sodium chloride (MBP/ADV) 50 mL MBP  1 g IntraVENous NOW    octreotide (SANDOSTATIN) 500 mcg in 0.9% sodium chloride 500 mL infusion  50 mcg/hr IntraVENous CONTINUOUS    0.9% sodium chloride infusion 250 mL  250 mL IntraVENous PRN    0.9% sodium chloride infusion 250 mL  250 mL IntraVENous PRN     Current Outpatient Medications   Medication Sig    LORazepam (ATIVAN) 1 mg tablet Take 1 Tab by mouth every eight (8) hours as needed (alcohol withdrawal). Max Daily Amount: 3 mg. Social History:  Social History     Tobacco Use    Smoking status: Not on file   Substance Use Topics    Alcohol use: Not on file         Family History:  No family history on file. Review of Systems:  A detailed 10 system ROS is obtained, with pertinent positives as listed above. All others are negative. Diet:  NPO    Objective:     Physical Exam:  Vitals:  Visit Vitals  BP (!) 80/52   Pulse (!) 111   Temp 97.7 °F (36.5 °C)   Resp 16   Ht 6' 7\" (2.007 m)   Wt 81.6 kg (180 lb)   SpO2 93%   BMI 20.28 kg/m²     Gen:  Pt is alert, cooperative  Skin:  Extremities and face reveal no rashes. Open areas to skin to LUE after reported fall  HEENT: Sclerae anicteric.   Extra-occular muscles are intact. Dipika Corea with dried blood  Cardiovascular: Regular rate and rhythm. No murmurs, gallops, or rubs. Respiratory:  Comfortable breathing with no accessory muscle use. Clear breath sounds anteriorly with no wheezes, rales, or rhonchi. GI:  Abdomen distended, firm, and nontender. Normal active bowel sounds. No enlargement of the liver or spleen. No masses palpable. Rectal:  Deferred  Musculoskeletal:  No pitting edema of the lower legs. Neurological:  Gross memory appears intact. Patient is alert and oriented. Psychiatric:  Mood appears appropriate with judgement intact. Lymphatic:  No cervical or supraclavicular adenopathy. Laboratory:    Recent Labs     02/06/21  1509   WBC 16.5*   HGB 8.7*   HCT 26.3*   *   .0*          Assessment:     Active Problems:    * Hematemesis *    Acute blood loss anemia    Cirrhosis    Hepatitis C    Plan:     61 yo male is seen in consultation for evaluation of anemia and hematemesis, presenting to the ED today after falling and found down by family. He reports dark bloody emesis x 3 days but no stools. He was seen in 12/2020 for new findings of cirrhosis on ultrasound and reports no etoh since that time. No flow noted in portal vein on U/S at that time. Hgb in ER 8.7 on arrival, down from 11.2 on admission in [facility]; other labs pending. 1.  Admission per hospitalist service recommended  2. IV fluid bolus for hypotension  3. Octreotide started by ED physician  4. Type and cross with one unit PRBC ordered; will additionally place 2 on hold in blood bank  5. Protonix IV bid  6. Plan EGD for later today after fluid resuscitation and BP improved  7.   Further recommendations pending above       Patient is seen and examined in collaboration with Dr. Cuauhtemoc Mackey.  Assessment and plan as per Dr. Fiona Rolon NP

## 2021-02-06 NOTE — ANESTHESIA POSTPROCEDURE EVALUATION
Procedure(s): ESOPHAGOGASTRODUODENOSCOPY (EGD) ENDOSCOPIC ARGON PLASMA COAGULATION. general 
 
Anesthesia Post Evaluation Patient location during evaluation: ICU Patient participation: complete - patient cannot participate Level of consciousness: obtunded/minimal responses Airway patency: patent Anesthetic complications: no 
Cardiovascular status: acceptable Respiratory status: ETT and ventilator Hydration status: acceptable Post anesthesia nausea and vomiting:  controlled INITIAL Post-op Vital signs:  
Vitals Value Taken Time /80 02/06/21 1857 Temp Pulse 144 02/06/21 1857 Resp 16 02/06/21 1857 SpO2 100 % 02/06/21 1857

## 2021-02-07 NOTE — PROGRESS NOTES
GI DAILY PROGRESS NOTE    Admit Date:  2/6/2021    Today's Date:  2/7/2021    CC: Coffee ground emesis    Subjective:     Patient seen and examined this AM. Had a diffusely ulcerated esophagus on endoscopy with diffuse hemorrhaging. Forced to use hemospray to control bleeding. UOP ~200cc. On low dose pressor.      Medications:   Current Facility-Administered Medications   Medication Dose Route Frequency    octreotide (SANDOSTATIN) 500 mcg in 0.9% sodium chloride 500 mL infusion  50 mcg/hr IntraVENous CONTINUOUS    0.9% sodium chloride infusion 250 mL  250 mL IntraVENous PRN    0.9% sodium chloride infusion 250 mL  250 mL IntraVENous PRN    pantoprazole (PROTONIX) 40 mg in 0.9% sodium chloride 10 mL injection  40 mg IntraVENous Q12H    ondansetron (ZOFRAN) injection 4 mg  4 mg IntraVENous Q4H PRN    prochlorperazine (COMPAZINE) with saline injection 10 mg  10 mg IntraVENous Q6H PRN    sodium chloride (NS) flush 5-40 mL  5-40 mL IntraVENous PRN    albuterol (PROVENTIL VENTOLIN) nebulizer solution 2.5 mg  2.5 mg Inhalation PRN    dexmedeTOMidine in 0.9 % NaCl (PRECEDEX) 400 mcg/100 mL (4 mcg/mL) infusion soln  0.1-1.5 mcg/kg/hr IntraVENous TITRATE    fentaNYL in normal saline (pf) 25 mcg/mL infusion  0-1.5 mcg/kg/hr IntraVENous TITRATE    sodium chloride (NS) flush 5-40 mL  5-40 mL IntraVENous Q8H    sodium chloride (NS) flush 5-40 mL  5-40 mL IntraVENous PRN    acetaminophen (TYLENOL) tablet 650 mg  650 mg Oral Q6H PRN    polyethylene glycol (MIRALAX) packet 17 g  17 g Oral DAILY PRN    promethazine (PHENERGAN) tablet 12.5 mg  12.5 mg Oral Q6H PRN    Or    ondansetron (ZOFRAN) injection 4 mg  4 mg IntraVENous Q6H PRN    piperacillin-tazobactam (ZOSYN) 3.375 g in 0.9% sodium chloride (MBP/ADV) 100 mL MBP  3.375 g IntraVENous Q8H    0.9% sodium chloride infusion 250 mL  250 mL IntraVENous PRN    NOREPINephrine (LEVOPHED) 4 mg in 5% dextrose 250 mL infusion  0.5-16 mcg/min IntraVENous TITRATE    0.9% sodium chloride infusion 250 mL  250 mL IntraVENous PRN       Review of Systems:  ROS was obtained, with pertinent positives as listed above. No chest pain or SOB. Diet:      Objective:   Vitals:  Visit Vitals  /65   Pulse 68   Temp 98.4 °F (36.9 °C)   Resp 20   Ht 6' 7\" (2.007 m)   Wt 81.6 kg (180 lb)   SpO2 100%   BMI 20.28 kg/m²     Intake/Output:  02/06 1901 - 02/07 0700  In: 1144   Out: -   02/05 0701 - 02/06 1900  In: 310   Out: -   Exam:  General appearance: intubated, sedated  Lungs: clear to auscultation bilaterally anteriorly  Heart: regular rate and rhythm  Abdomen: soft, non-tender. Distended. Bowel sounds normal. No masses, no organomegaly  Extremities: extremities normal, atraumatic, no cyanosis. +LE edema  Neuro:  alert and oriented    Data Review (Labs):    Recent Labs     02/07/21  0310 02/07/21  0126 02/06/21  1929 02/06/21  1509   WBC 11.4*  --  14.5* 16.5*   HGB 11.3* 11.1* 11.3* 8.7*   HCT 33.7*  --  34.4* 26.3*   *  --  126* 129*   MCV 97.4  --  103.6* 111.0*   *  --   --  135*   K 4.1  --   --  4.1     --   --  98   CO2 31  --   --  27   BUN 41*  --   --  33*   CREA 1.24  --   --  1.24   CA 7.7*  --   --  8.2*   MG  --   --  1.9 2.1   *  --   --  88   AP 56  --   --  65   ALT 20  --   --  24   AST 37  --   --  46*   ALB 1.7*  --   --  1.7*   TP 4.5*  --   --  5.0*   LPSE  --   --   --  72*   PTP 16.9*  --   --  21.8*   INR 1.4  --   --  1.9   APTT  --   --   --  27.4       Radiology:  Xr Chest Sngl V    Result Date: 2/7/2021  Unchanged bilateral lung infiltrates. Xr Chest Sngl V    Result Date: 2/6/2021  1. Left IJ central line tip projects over the midline upper mediastinum. No pneumothorax. 2. Stable multifocal pneumonia. Xr Chest Port    Result Date: 2/6/2021  1. Endotracheal tube is in appropriate position.  2. Emphysematous lungs with heterogeneous airspace opacities of the right upper lobe, right lower lobe, left lower lobe suspicious for multifocal pneumonia. Assessment:     Principal Problem:    Hematemesis with nausea (2/6/2021)    Active Problems:    Liver cirrhosis ([address] (12/10/2020)      Ascites (12/10/2020)      Hepatitis C antibody positive in blood (12/12/2020)      Anemia (2/6/2021)      Hemorrhagic shock ([address] (2/6/2021)      Esophageal bleeding (2/6/2021)      Encounter for weaning from ventilator ([address] (2/6/2021)      Severe protein-calorie malnutrition ([address] (2/6/2021)        Plan:     Hgb stable  Continue supportive care  Would continue octreotide and ppi at current doses  Transfuse as needed  Will follow  Will consider re-scoping in a few days depending on his clinical progression. RUQ with paracentesis will be needed once more stable.      Chika Ibanez MD  [facility], [place]

## 2021-02-07 NOTE — PROGRESS NOTES
Ventilator check complete; patient has a #7.5 ET tube secured at the 26 at the lip. Patient is sedated. Patient is not able to follow commands. Breath sounds are coarse and diminished. Trachea is midline, Negative for subcutaneous air, and chest excursion is symmetric. Patient is also Negative for cyanosis and is Negative for pitting edema. All alarms are set and audible. Resuscitation bag is at the head of the bed. Ventilator Settings Mode FIO2 Rate Tidal Volume Pressure PEEP I:E Ratio VC+  100 %   18 500 ml     8 cm H20  1:2.70 Peak airway pressure: 25 cm H2O Minute ventilation: 9.26 l/min Glenna Burnham, RT

## 2021-02-07 NOTE — PROGRESS NOTES
Ventilator check complete; patient has a #7.5 ET tube secured at the 26 at the lip. Patient is sedated. Patient is not able to follow commands. Breath sounds are clear. Trachea is midline, Negative for subcutaneous air, and chest excursion is symmetric. Patient is also Negative for cyanosis and is Negative for pitting edema. All alarms are set and audible. Resuscitation bag is at the head of the bed. Ventilator Settings Mode FIO2 Rate Tidal Volume Pressure PEEP I:E Ratio VC+  40 %    560 ml     8 cm H20  1:2.33 Peak airway pressure: 28 cm H2O Minute ventilation: 12.1 l/min Hector Garibay, RT

## 2021-02-07 NOTE — ED NOTES
TRANSFER - OUT REPORT: 
 
Verbal report given to Toan Diallo RN on Chetan Bob  being transferred to [facility] for routine progression of care Report consisted of patients Situation, Background, Assessment and  
Recommendations(SBAR). Information from the following report(s) SBAR, ED Summary, Procedure Summary, Intake/Output, MAR and Recent Results was reviewed with the receiving nurse. Lines:  
Peripheral IV 02/06/21 Right Forearm (Active) Peripheral IV 02/06/21 Right External jugular (Active) Peripheral IV 02/06/21 Left Antecubital (Active) Peripheral IV 02/06/21 Left Hand (Active) Opportunity for questions and clarification was provided. Patient transported with: 
 Monitor O2 @ ET tube post op liters Registered Nurse VTE prophylaxis orders have not been written for Chetan Bob. Patient and family given floor number and nurses name. Family updated re: pt status after security code verified.

## 2021-02-07 NOTE — ED NOTES
Pt transported to GI lab by Elton Tyler MD anesthesia and Andria Navarrete RN for emergent GI scope via transport monitor on stretcher, report given to Abhinav chamberlain RN and Elton Tyler MD at bedside. VSS. Family situation has been explained to nurse.

## 2021-02-07 NOTE — H&P
[address] [place] [address] Service History and Physical 
 
Patient ID: 
Jyoti Stevens 
male 1955 
[id number] Admission Date: 2/6/2021 Chief Complaint: Coffee-ground emesis Reason for Admission: Hemodynamic shock, gastrointestinal hemorrhaging ASSESSMENT & PLAN: 
HPI: 29-year-old male with past medical history of liver cirrhosis secondary to alcohol use, hepatitis C. With a complaint of nausea vomiting x3 days with coffee-ground emesis preceded by weakness and unsteady gait where you fell at home he was found down by his son. He was then  Brought to the emergency department by EMS He was initially started on octreotide drip, IV ceftriaxone, and IV Protonix, he was also noted to be hypotensive were packed red blood cells transfusions were ordered and recovery of blood pressure was initially promising, his mental status then began to wean, he became hypoxic, and had frequent episodes of copious vomiting of dark red blood. He was emergently intubated in the emergency department, placed on mechanical ventilator, sedated with Precedex and fentanyl, hemodynamically stable for transfer to GI endoscopy where a EGD was performed  revealing diffusely ulcerated esophagus with appearances of necrosis, diffuse bleeding was seen, one focus was identified and controlled with APC, and then noted the esophagus had other areas of bleeding in multiple locations. The tissue was reported to be friable that clipping and banding or gold probe were likely to be ineffective, hemospray was then applied to the entire length of the esophagus was coated with hemospray material, and hemostasis appeared to be achieved, no variceal bleed was identified. The stomach was seen with a large amount of blood, so visualization was limited but no gastric varices were able to be seen, the duodenal bulb and second portions of the duodenum appeared normal.  He was then transferred to the intensive care unit, and intensivist consulted for continued critical care management. Undifferentiated hemodynamic shock Hemorrhagic shock with gastrointestinal hemorrhaging and hematemesis Hypovolemic shock Septic shock possibility of underlying SBP, and aspiration pneumonia Gastroenterology consult placed, EGD performed revealing PRBC, FFP, DDAVP, vitamin K, platelets transfuse to maintain hemodynamics and hemoglobin targets IV vasopressors for hemodynamic support Intensivist consulted for further critical care management Acute hypoxemic and hypercarbic respiratory failure Secondary to aspiration, aspiration pneumonia, airway protection, underlying COPD Endotracheally intubated 7.5 ET tube, confirmed with chest x-ray Intubated and sedated on mechanical ventilator Intensivist consulted for further critical care management Decompensated liver cirrhosis with coagulopathy PRBC, FFP, DDAVP, vitamin K, platelets transfuse to maintain hemodynamics and hemoglobin targets Macrocytic anemia Secondary to liver cirrhosis, longstanding history of alcoholism Protein calorie malnutrition Secondary to cachexia of COPD, and adult failure to thrive Disposition: ICU Diet: NPO 
VTE ppx: SCDs GI ppx: IV PPI 
CODE STATUS: Full Surrogate decision-maker: His Daughter in [place] This visit took in excess of 45 minutes of Critical Care time in evaluation and management of a critically ill or injured patient, such that the critical illness or injury acutely impairs one or more organ system: Hemorrhagic shock with gastrointestinal hemorrhaging and hematemesis Hypovolemic shock  Septic shock possibility of underlying SBP, and aspiration pneumonia 
 
 such that there is a high probability of imminent or life-threatening deterioration in the patient's condition needing immediate attention or presence of critical care physician near bedside for the above time . The time listed is exclusive of any procedures which may have been performed. Critical care time includes time spent at bedside performing patient interview and physical exam, time spent researching patient prior to interaction with patient, time spent discussing findings and treatment plan with patient and/or family, time spent discussing patient with consultants and colleagues, time spent reviewing pertinent laboratory and radiographic evaluations, time spent re-evaluating patient, or time spent discussing patient with nursing staff. No Known Allergies Prior to Admission Medications Prescriptions Last Dose Informant Patient Reported? Taking? LORazepam (ATIVAN) 1 mg tablet   No No  
Sig: Take 1 Tab by mouth every eight (8) hours as needed (alcohol withdrawal). Max Daily Amount: 3 mg. Facility-Administered Medications: None Past Medical History:  
Diagnosis Date  Cirrhosis ([facility] Past Surgical History:  
Procedure Laterality Date  IR PARACENTESIS ABD W IMAGE  12/11/2020 Social History Tobacco Use  Smoking status: Not on file Substance Use Topics  Alcohol use: Not on file FAMILY HISTORY:   
Unable to be determined because of patient's extremis state and altered mental status REVIEW OF SYSTEMS: 
A 14 point review of systems was taken and pertinent positive as per HPI. PHYSICAL EXAM: 
 
Visit Vitals /67 Pulse 74 Temp (!) 94.8 °F (34.9 °C) Resp 22 Ht 6' 7\" (2.007 m) Wt 81.6 kg (180 lb) SpO2 100% BMI 20.28 kg/m² General: Disheveled appearing male, who is chronically and acute ill-appearing male, tall in stature, thin, cachectic, visibly jaundiced, and vomiting blood HEENT: Right pupil reactive, left prior unknown undisclosed eye pathology/blindness Neck: Supple, no lymphadenopathy, no JVD Lungs: Bibasilar crackles Cardiovascular: Regular rate and rhythm with normal S1 and S2 Abdomen: Hepatomegaly, distended Extremities: No cyanosis clubbing or edema Neuro: Nonfocal, is alert and oriented but increasingly becoming somnolent Intake/Output last 3 shifts: 
Date 02/06/21 0700 - 02/07/21 7061 02/07/21 0700 - 02/08/21 7540 Shift 3827-3327 6374-1492 24 Hour Total 5164-2114 0233-7634 24 Hour Total  
INTAKE Blood  PRBC 310  310 Volume (TRANSFUSE PACKED RBC'S)  310 310 Volume (TRANSFUSE PACKED RBC'S)  310 310 Volume (TRANSFUSE PLASMA)  302 302 Shift Total(mL/kg) 310(3.8) 922(11.3) 1232(15.1) OUTPUT  
 Shift Total(mL/kg) NET [id number] [id number] Weight (kg) 81.6 81.6 81.6 81.6 81.6 81.6 Labs: 
CMP:  
Lab Results Component Value Date/Time  (L) 02/06/2021 03:09 PM  
 K 4.1 02/06/2021 03:09 PM  
 CL 98 02/06/2021 03:09 PM  
 CO2 27 02/06/2021 03:09 PM  
 AGAP 10 02/06/2021 03:09 PM  
 GLU 88 02/06/2021 03:09 PM  
 BUN 33 (H) 02/06/2021 03:09 PM  
 CREA 1.24 02/06/2021 03:09 PM  
 GFRAA >60 02/06/2021 03:09 PM  
 GFRNA >60 02/06/2021 03:09 PM  
 CA 8.2 (L) 02/06/2021 03:09 PM  
 MG 1.9 02/06/2021 07:29 PM  
 PHOS 3.7 02/06/2021 07:29 PM  
 ALB 1.7 (L) 02/06/2021 03:09 PM  
 TBILI 1.7 (H) 02/06/2021 03:09 PM  
 TP 5.0 (L) 02/06/2021 03:09 PM  
 GLOB 3.3 02/06/2021 03:09 PM  
 AGRAT 0.5 (L) 02/06/2021 03:09 PM  
 ALT 24 02/06/2021 03:09 PM  
 
 
 
CBC:   
Lab Results Component Value Date/Time WBC 14.5 (H) 02/06/2021 07:29 PM  
 HGB 11.3 (L) 02/06/2021 07:29 PM  
 HCT 34.4 (L) 02/06/2021 07:29 PM  
  (L) 02/06/2021 07:29 PM  
 
 
Lab Results Component Value Date/Time INR 1.9 02/06/2021 03:09 PM  
 INR 1.4 12/10/2020 11:44 AM  
 Prothrombin time 21.8 (H) 02/06/2021 03:09 PM  
 Prothrombin time 17.5 (H) 12/10/2020 11:44 AM  
 
 
ABG:   
Lab Results Component Value Date/Time PHI 7.29 (L) 02/06/2021 08:37 PM  
 PCO2I 56.1 (H) 02/06/2021 08:37 PM  
 PO2I 113 (H) 02/06/2021 08:37 PM  
 HCO3I 26.8 (H) 02/06/2021 08:37 PM  
 FIO2I 100 02/06/2021 08:37 PM  
 
 
 
 
Lab Results Component Value Date/Time CK 87 02/06/2021 03:09 PM  
 
 
 
Imaging & Other Studies: XR Results (maximum last 3): Results from [place] Encounter encounter on 02/06/21 XR CHEST SNGL V  
 Narrative EXAM: XR CHEST SNGL V 
INDICATION: central line placement COMPARISON: Chest x-ray, 2/6/2021 FINDINGS: 
The cardiomediastinal silhouette is within normal limits. Interval placement of 
a left-sided central line with the tip overlying the midline mediastinum.  The 
 endotracheal tube remains satisfactorily positioned. There are likely trace 
bilateral pleural effusions. Unchanged multifocal airspace opacities throughout 
both lungs, most advanced in the right lung. No pneumothorax. No acute osseous 
abnormality. Impression 1. Left IJ central line tip projects over the midline upper mediastinum. No 
pneumothorax. 2. Stable multifocal pneumonia. XR CHEST PORT Narrative EXAM: Single view chest radiograph. INDICATION: Status post intubation. COMPARISON: Chest radiograph dated December 10, 2020. FINDINGS: 
Endotracheal tube is in appropriate position. No pneumothorax or pleural 
effusion. There are heterogeneous airspace opacities of the right upper lobe and 
peripheral aspect of the right lower lobe as well as of the left lung base but 
appears to be underlying emphysematous changes of the lungs. Heart is normal in 
size. No acute osseous abnormality. Impression 1. Endotracheal tube is in appropriate position. 2. Emphysematous lungs with heterogeneous airspace opacities of the right upper 
lobe, right lower lobe, left lower lobe suspicious for multifocal pneumonia. Results from [facility] Encounter encounter on 12/10/20 XR CHEST PA LAT Narrative EXAMINATION: XR CHEST PA LAT  12/10/2020 2:52 PM 
 
ACCESSION NUMBER: [id number] COMPARISON: None available INDICATION: fluid overload FINDINGS:  
 
Lungs: Coarse interstitial markings. Emphysematous changes. Apical pleural 
thickening. No infiltrate. No evidence of pleural effusion. Vasculature: Within normal limits. Cardiac: No cardiomegaly. Natalie/Mediastinum:  No visible mass or adenopathy. Bones: No acute changes. Other:  No additional findings. Impression IMPRESSION: 
 
1. No acute abnormality. CT Results (maximum last 3): No results found for this or any previous visit. MRI Results (maximum last 3): No results found for this or any previous visit. Nuclear Medicine Results (maximum last 3): No results found for this or any previous visit. US Results (maximum last 3): [facility] encounter on 12/10/20 US ABD LTD Narrative EXAM: US ABD LTD INDICATION: abdominal distension and lower extremity edema COMPARISON: None. TECHNIQUE:  
Real-time sonography of the right upper abdomen abdomen was performed with 
multiple static images of the liver, gallbladder, pancreas, spleen, kidneys, 
abdominal aorta and IVC obtained. FINDINGS: 
Liver: Mildly enlarged liver measuring 18.6 cm. Increased parenchymal 
echogenicity. Coarse echotexture. No flow detected in the portal vein. Ascites. Biliary: There is gallbladder sludge or fine layering gallstones. No shadowing 
gallstones demonstrated. The extrahepatic biliary duct measures 2.7 mm. There is 
thickening of the gallbladder wall which may be pseudothickening due to the 
ascites. Pancreas: The pancreas is normal.   
 
Right kidney: The kidney demonstrate normal echogenicity with no mass, stone or 
hydronephrosis. The right kidney measures 13.6 cm in length. Aorta/IVC: Not visualized. Impression IMPRESSION:  
 
Gallbladder sludge or fine nonshadowing gallstones. No evidence of biliary duct 
obstruction. Enrico Nathanael Cirrhotic morphology to the liver. Ascites. Flow in the portal vein could not be 
confirmed. DEXA Results (maximum last 3): No results found for this or any previous visit. [place]ORENCE Results (maximum last 3): No results found for this or any previous visit. IR Results (maximum last 3): Results from [facility] [place] Encounter encounter on 12/10/20 IR PARACENTESIS ABD W IMAGE Narrative Title: Ultrasound guided paracentesis. History: Alcoholic cirrhosis, ascites. :  Derek Huerta PA-C Supervising Physician: Dylon Dominguez M.D. Consent:  Informed written and oral consent was obtained from the patient after the risks and benefits were discussed. All questions were answered and the 
patient requested that we proceed. Procedure:  Maximal sterile barrier technique was used. Following routine prep 
and drape of the abdomen, a local field block with 1% lidocaine was achieved. Ultrasound evaluation was performed. Fluid was identified in the peritoneal cavity. Using real-time ultrasound 
guidance, the peritoneal cavity was accessed with an 8 Pashto centesis catheter. The catheter was connected to wall suction. A total of 1050 cc of thin yellow fluid was removed. The catheter was removed 
and a bandage applied. Complications: None. Findings: Large-volume ascites. Impression Impression: Uncomplicated ultrasound guided paracentesis. VAS/US Results (maximum last 3): Results from [place] encounter on 10/16/20 DUPLEX LOWER EXT VENOUS LEFT Narrative Clinical History: The Male patient is 60years old  presenting with symptoms of 
Swelling left lower extremity. Comparisons:  None Findings: The left common femoral, superficial femoral, popliteal, and visualized calf 
veins are patent demonstrating normal compressibility, normal color flow, and 
normal response to distal augmentation. The peroneal veins however are poorly 
visualized and noncompressible with questionable thrombus. Impression Impression: 1. Poor visualization of peroneal veins may be noncompressible. 2. Otherwise no evidence of clinically significant left lower extremity DVT. CPT code(s) 11468 PET Results (maximum last 3): No results found for this or any previous visit. No results found for this or any previous visit. Active Problems: 
Patient Active Problem List  
 Diagnosis Date Noted  Anemia 02/06/2021  Hematemesis with nausea 02/06/2021  Hemorrhagic shock ([facility] 02/06/2021  Esophageal bleeding 02/06/2021  Encounter for weaning from ventilator ([facility] 02/06/2021  Severe protein-calorie malnutrition ([facility] 02/06/2021  Hepatitis C antibody positive in blood 12/12/2020  Hyponatremia 12/10/2020  Liver cirrhosis ([facility] 12/10/2020  Ascites 12/10/2020  Elevated bilirubin 12/10/2020  Alcohol dependence ([facility] 12/10/2020  
 Nicotine dependence 12/10/2020  Thrombocytopenia ([facility] 12/10/2020  Diarrhea 12/10/2020  DNR (do not resuscitate) 12/10/2020 Dennis Styles MD 
[address] [place] Service 2/7/2021 1:18 AM

## 2021-02-07 NOTE — PROGRESS NOTES
ICU called and notified of procedure end. Patient has been assessed and ready to be transported.  Patient transported with monitor and ambu bag by RN and Dr. Nayeli Todd, anesthesiologist.

## 2021-02-07 NOTE — H&P
HISTORY AND PHYSICAL Chetan Bob 2/6/2021 Date of Admission:  2/6/2021 The patient's chart is reviewed and the patient is discussed with the staff. Subjective:  
 
Patient is a 60 y.o.  male presents with hematemesis. Patient per notes from ER and chart has history of ETOH abuse and had dark emesis for 3 days. Found down by family and unresponsive with SBP of 80/42 and hg of 8.7. Per chart son told staff stopped ETOH about 2 months after years of a case a day. Also using NSAIDS, TUMS and pepto bismo. Recently noted to have HCV+ and cirrhotic on US. Patient seen by GI STAT and felt unstable from likely decompensated cirrhosis and suspected PVT. The did EGD and Anesthesia intubated patient and took to OR. GI endoscopy noted to have diffusely ulcerated esophagus. No active varices or ulcer noted. Tissue was reported friable and used hemospray instead and coated esophagus. Get get 2 units of PRBC and ordered FFP and platelets. Patient now in ICU on Vent and we were asked to take over care now. Per notes from staff -- SON was belligerent with staff and banned from hospital at this time. Daughter in law -- will be contacted instead for care. -- Erich -- [phone] Erich reports that found on the floor and report he was on the floor about 1 hour. Then shaking and had to let him back to the floor. Very weak. Erich is CRNA and reports patient has not been eating for a while and N/V for 3 days. She is not aware of issues with  and staff. She will be happy to be the  to talk to for the patient's care. Review of Systems Sedated on vent and not able to assess. Patient Active Problem List  
Diagnosis Code  Hyponatremia E87.1  Liver cirrhosis ([facility] K74.60  Ascites R18.8  Elevated bilirubin R17  Alcohol dependence (HCC) F10.20  
 Nicotine dependence F17.200  Thrombocytopenia ([facility] D69.6  Diarrhea R19.7  DNR (do not resuscitate) [phone]  Hepatitis C antibody positive in blood R76.8  Anemia D64.9  
 Hematemesis with nausea K92.0  Hemorrhagic shock (HCC) R57.8  Esophageal bleeding K22.8  Encounter for weaning from ventilator [facility]) Z99.11  Severe protein-calorie malnutrition ([facility] E43 Prior to Admission Medications Prescriptions Last Dose Informant Patient Reported? Taking? LORazepam (ATIVAN) 1 mg tablet   No No  
Sig: Take 1 Tab by mouth every eight (8) hours as needed (alcohol withdrawal). Max Daily Amount: 3 mg. Facility-Administered Medications: None Past Medical History:  
Diagnosis Date  Cirrhosis ([facility] Past Surgical History:  
Procedure Laterality Date  IR PARACENTESIS ABD W IMAGE  12/11/2020 Social History Socioeconomic History  Marital status:  Spouse name: Not on file  Number of children: Not on file  Years of education: Not on file  Highest education level: Not on file Occupational History  Not on file Social Needs  Financial resource strain: Not on file  Food insecurity Worry: Not on file Inability: Not on file  Transportation needs Medical: Not on file Non-medical: Not on file Tobacco Use  Smoking status: Not on file Substance and Sexual Activity  Alcohol use: Not on file  Drug use: Not on file  Sexual activity: Not on file Lifestyle  Physical activity Days per week: Not on file Minutes per session: Not on file  Stress: Not on file Relationships  Social connections Talks on phone: Not on file Gets together: Not on file Attends Hoahaoism service: Not on file Active member of club or organization: Not on file Attends meetings of clubs or organizations: Not on file Relationship status: Not on file  Intimate partner violence Fear of current or ex partner: Not on file Emotionally abused: Not on file Physically abused: Not on file Forced sexual activity: Not on file Other Topics Concern  Not on file Social History Narrative  Not on file No family history on file. No Known Allergies Current Facility-Administered Medications Medication Dose Route Frequency  octreotide (SANDOSTATIN) 500 mcg in 0.9% sodium chloride 500 mL infusion  50 mcg/hr IntraVENous CONTINUOUS  
 0.9% sodium chloride infusion 250 mL  250 mL IntraVENous PRN  
 0.9% sodium chloride infusion 250 mL  250 mL IntraVENous PRN  pantoprazole (PROTONIX) 40 mg in 0.9% sodium chloride 10 mL injection  40 mg IntraVENous Q12H  
 dextrose 5% and 0.9% NaCl infusion  50 mL/hr IntraVENous CONTINUOUS  
 ondansetron (ZOFRAN) injection 4 mg  4 mg IntraVENous Q4H PRN  prochlorperazine (COMPAZINE) with saline injection 10 mg  10 mg IntraVENous Q6H PRN  
 sodium chloride (NS) flush 5-40 mL  5-40 mL IntraVENous PRN  
 albuterol (PROVENTIL VENTOLIN) nebulizer solution 2.5 mg  2.5 mg Inhalation PRN  
 dexmedeTOMidine in 0.9 % NaCl (PRECEDEX) 400 mcg/100 mL (4 mcg/mL) infusion soln  0.1-1.5 mcg/kg/hr IntraVENous TITRATE  fentaNYL in normal saline (pf) 25 mcg/mL infusion  0-1.5 mcg/kg/hr IntraVENous TITRATE  sodium chloride (NS) flush 5-40 mL  5-40 mL IntraVENous Q8H  
 sodium chloride (NS) flush 5-40 mL  5-40 mL IntraVENous PRN  
 acetaminophen (TYLENOL) tablet 650 mg  650 mg Oral Q6H PRN  polyethylene glycol (MIRALAX) packet 17 g  17 g Oral DAILY PRN  promethazine (PHENERGAN) tablet 12.5 mg  12.5 mg Oral Q6H PRN Or  
 ondansetron (ZOFRAN) injection 4 mg  4 mg IntraVENous Q6H PRN  piperacillin-tazobactam (ZOSYN) 3.375 g in 0.9% sodium chloride (MBP/ADV) 100 mL MBP  3.375 g IntraVENous Q8H  
 desmopressin (DDAVP) 24.48 mcg in 0.9% sodium chloride 50 mL IVPB  0.3 mcg/kg IntraVENous ONCE  
 0.9% sodium chloride infusion 250 mL  250 mL IntraVENous PRN Objective:  
 
Vitals: 02/06/21 1700 02/06/21 1749 02/06/21 1758 02/06/21 1857 BP: 134/79 135/80 (!) 144/80 124/80 Pulse: (!) 110 (!) 126 (!) 127 (!) 144 Resp:  20 20 16 Temp:      
SpO2: 93% 100% 100% 100% Weight:      
Height:      
 
Ventilator Settings Mode FIO2 Rate Tidal Volume Pressure PEEP  
VC+  100 %    400 ml     5 cm H20 Peak airway pressure: 33 cm H2O Minute ventilation: 8.09 l/min ABG: No results for input(s): PH, PCO2, PO2, HCO3, PHI, PCO2I, PO2I, HCO3I in the last 72 hours. 02/05 0701 - 02/06 1900 In: 310 Out: - PHYSICAL EXAM  
 
Constitutional:  the patient is well developed and in no acute distress EENMT:  Sclera clear, pupils equal, oral mucosa moist 
Respiratory: coarse with mild rhonchi. No wheezing Cardiovascular:  RRR without M,G,R 
Gastrointestinal: soft and distended no bowel sounds. Musculoskeletal: warm without cyanosis. There is +1 lower extremity edema. Skin:  no jaundice or rashes, no visible wounds  +spider nevi Neurologic: no gross neuro deficits Psychiatric:  Sedated on vent. Drips: 
Fentanyl 50 
precedex 0.4 D5NS at 50 Octrotide CXR:ETT noted with b/l infiltrates R> left CXR Results  (Last 48 hours) 02/06/21 1755  XR CHEST PORT Final result Impression:  1. Endotracheal tube is in appropriate position. 2. Emphysematous lungs with heterogeneous airspace opacities of the right upper  
lobe, right lower lobe, left lower lobe suspicious for multifocal pneumonia. Narrative:  EXAM: Single view chest radiograph. INDICATION: Status post intubation. COMPARISON: Chest radiograph dated December 10, 2020. FINDINGS:  
Endotracheal tube is in appropriate position. No pneumothorax or pleural  
effusion. There are heterogeneous airspace opacities of the right upper lobe and  
peripheral aspect of the right lower lobe as well as of the left lung base but appears to be underlying emphysematous changes of the lungs. Heart is normal in  
size. No acute osseous abnormality. Recent Labs 02/06/21 
1509 WBC 16.5* HGB 8.7* HCT 26.3*  
* INR 1.9 Recent Labs 02/06/21 
1509 * K 4.1 CL 98  
GLU 88  
CO2 27 BUN 33* CREA 1.24  
MG 2.1 CA 8.2* ALB 1.7* TBILI 1.7* ALT 24 No results for input(s): PH, PCO2, PO2, HCO3, PHI, PCO2I, PO2I, HCO3I in the last 72 hours. No results for input(s): LCAD, LAC in the last 72 hours. Assessment:  (Medical Decision Making) Hospital Problems  Date Reviewed: 2/6/2021 Codes Class Noted POA * (Principal) Hematemesis with nausea ICD-10-CM: K92.0 ICD-9-CM: 578.0, 787.02  2/6/2021 Unknown Anemia ICD-10-CM: D64.9 ICD-9-CM: 285.9  2/6/2021 Unknown Esophageal bleeding ICD-10-CM: K22.8 ICD-9-CM: 530.82  2/6/2021 Unknown Encounter for weaning from ventilator [facility]) ICD-10-CM: Z99.11 ICD-9-CM: V46.13  2/6/2021 Unknown Liver cirrhosis (HCC) ICD-10-CM: K74.60 ICD-9-CM: 571.5  12/10/2020 Yes Severe protein-calorie malnutrition ([facility] ICD-10-CM: L23 ICD-9-CM: 537  2/6/2021 Unknown Hepatitis C antibody positive in blood ICD-10-CM: R76.8 ICD-9-CM: 795.79  12/12/2020 Yes Hemorrhagic shock ([facility] ICD-10-CM: R57.8 ICD-9-CM: 785.59  2/6/2021 Unknown Ascites ICD-10-CM: R18.8 ICD-9-CM: 789.59  12/10/2020 Yes Patient with cirrhosis/ETOH abuse history/HepC came in with hematemesis and on vent now. EDG with friable esophagus. Sedated now Plan:  (Medical Decision Making) --continue vent support 
--do not plan to extubate tonight.  
--serial hg/hct and f/u INR 
--given 2 units prbc and f/u later. Will add vit k 
--per GI will continue octreotide and add ppi q12 for now. 
--get abg 
--start abx on zosyn and should be enough for likely SBP and aspiration pna 
--scd 
--continue remaining treatment. Daughter in law -- will be contacted instead for son for care . -- Ant Lei -- [phone] -- I spoke with her. More than 50% of the time documented was spent in face-to-face contact with the patient and in the care of the patient on the floor/unit where the patient is located.  
 
Jagdish Billingsley MD

## 2021-02-07 NOTE — ED NOTES
Sivan Shrestha MD at bedside, verbal order for 1 unit emergent blood now for patient. This nurse to blood bank to obtain blood.

## 2021-02-07 NOTE — PROGRESS NOTES
TRANSFER - IN REPORT: 
 
Verbal report received from endo RN(name) on Chichi Callejas  being received from ENDO(unit) for routine progression of care Report consisted of patients Situation, Background, Assessment and  
Recommendations(SBAR). Information from the following report(s) SBAR, Procedure Summary and Cardiac Rhythm sinus tach was reviewed with the receiving nurse. Opportunity for questions and clarification was provided. Assessment completed upon patients arrival to unit and care assumed.

## 2021-02-07 NOTE — PROGRESS NOTES
Endo team called in to do emergency EGD by Dr. An Ortega. Patient intubated in ER. Dr. Mercedes Edmond, anesthesiologist present to manage patient during procedure. No consent signed, family not at bedside. Consent obtained and signed by 2 MD's. Patient transported with monitor and ambu bag to endo fluoro room by RN and Dr. Mercedes Edmond.

## 2021-02-07 NOTE — PROGRESS NOTES
Bedside report given to primary RN, Verena Kingston. Patient moved from stretcher to bed. Procedure summary reviewed. Opportunity for questions provided, all answered.

## 2021-02-07 NOTE — ED NOTES
Susanna Draper MD on telephone, verbal order for 5mg Reglan, IV now at this time. Updated on pt status, pt to be scoped at 1830PM.

## 2021-02-07 NOTE — PROCEDURES
Indication: 
 
Time out done and correct patient identified and correct incision/insertion site identified. Everyone Agrees For procedure sterile techniques used including: Sterile gown, gloves, cap, mask, drapes and chlorhexidine to the insertions site/location. Using Ultrasound Large left Internal Jugular Vein Located. Area prepped and sterilized with chlorhexedine. Sterile drapes applied. Sterile sheath place on ultrasound probe. Patient given local lidocane for anesthesia. Using Seldinger Technique with realtime ultrasound guidance Quad Lumen Catheter inserted. Guidewire removed. All ports with blood return and lines flushed. Line Sutured in [facility]. Patient tolerated procedure well, no complications. CXR ordered and line in place to use. Estimated Blood loss: 5 ml James Rodrigues MD 
 
Electronically signed.

## 2021-02-07 NOTE — PROGRESS NOTES
Head to toe assessment completed on pt this AM: 
 
Neuro: Pt eyes open to pain/ stimulus. R eye, 2 mm and sluggish to react. L eye opaque. Decreased attention/ concentration and decreased command following. Resp: PRVC FiO2 40%. Coarse upper with wheezing/ diminished lower. Dark black secretions suctioned from ETT. Cardiac: NSR with MAP goal > 65. Levo gtt infusing. GI: NPO. Abd distended and rotund with hypoactive bowel sounds. : Serra - draining and patent. Michell UOP. Skin: Skin tears scattered on BUEs. L IJ CVC. Jaundiced. BLEs 3+ pitting edema.

## 2021-02-07 NOTE — PROGRESS NOTES
Dual skin assessment performed on admission with 2nd RN. Patient has multiple skin tears to both arms and scattered bruising. Purple area noted to right upper back. Sacrum is benign. No wounds or open areas. New allevyn applied

## 2021-02-07 NOTE — PROGRESS NOTES
Critical Care Daily Progress Note: 2/7/2021 Luci Alcantara Admission Date: 2/6/2021 The patient's chart is reviewed and the patient is discussed with the staff. Patient is a 60 y.o.  male presents with hematemesis. Patient per notes from ER and chart has history of ETOH abuse and had dark emesis for 3 days. Found down by family and unresponsive with SBP of 80/42 and hg of 8.7. Per chart son told staff stopped ETOH about 2 months after years of a case a day. Also using NSAIDS, TUMS and pepto bismo. Recently noted to have HCV+ and cirrhotic on US. 
  
Patient seen by GI STAT and felt unstable from likely decompensated cirrhosis and suspected PVT. The did EGD and Anesthesia intubated patient and took to OR.  
  
GI endoscopy noted to have diffusely ulcerated esophagus. No active varices or ulcer noted. Tissue was reported friable and used hemospray instead and coated esophagus.  
  
Get get 2 units of PRBC and ordered FFP and platelets.  
  
Patient now in ICU on Vent and we were asked to take over care now.  
  
Per notes from staff -- SON was belligerent with staff and banned from hospital at this time. Daughter in law -- will be contacted instead for care. -- Maritza Porter -- [phone] 
  
Maritza Porter reports that found on the floor and report he was on the floor about 1 hour. Then shaking and had to let him back to the floor. Very weak. Maritza Porter is CRNA and reports patient has not been eating for a while and N/V for 3 days. She is not aware of issues with  and staff. She will be happy to be the  to talk to for the patient's care. Subjective: EGD with friable mucosa, ulcerated esophagus hemorrhaging. Intubated and left on vent post procedure. On levo at 4. Current Facility-Administered Medications Medication Dose Route Frequency  octreotide (SANDOSTATIN) 500 mcg in 0.9% sodium chloride 500 mL infusion  50 mcg/hr IntraVENous CONTINUOUS  
  0.9% sodium chloride infusion 250 mL  250 mL IntraVENous PRN  
 0.9% sodium chloride infusion 250 mL  250 mL IntraVENous PRN  pantoprazole (PROTONIX) 40 mg in 0.9% sodium chloride 10 mL injection  40 mg IntraVENous Q12H  
 ondansetron (ZOFRAN) injection 4 mg  4 mg IntraVENous Q4H PRN  prochlorperazine (COMPAZINE) with saline injection 10 mg  10 mg IntraVENous Q6H PRN  
 sodium chloride (NS) flush 5-40 mL  5-40 mL IntraVENous PRN  
 albuterol (PROVENTIL VENTOLIN) nebulizer solution 2.5 mg  2.5 mg Inhalation PRN  
 dexmedeTOMidine in 0.9 % NaCl (PRECEDEX) 400 mcg/100 mL (4 mcg/mL) infusion soln  0.1-1.5 mcg/kg/hr IntraVENous TITRATE  fentaNYL in normal saline (pf) 25 mcg/mL infusion  0-1.5 mcg/kg/hr IntraVENous TITRATE  sodium chloride (NS) flush 5-40 mL  5-40 mL IntraVENous Q8H  
 sodium chloride (NS) flush 5-40 mL  5-40 mL IntraVENous PRN  
 acetaminophen (TYLENOL) tablet 650 mg  650 mg Oral Q6H PRN  polyethylene glycol (MIRALAX) packet 17 g  17 g Oral DAILY PRN  promethazine (PHENERGAN) tablet 12.5 mg  12.5 mg Oral Q6H PRN Or  
 ondansetron (ZOFRAN) injection 4 mg  4 mg IntraVENous Q6H PRN  piperacillin-tazobactam (ZOSYN) 3.375 g in 0.9% sodium chloride (MBP/ADV) 100 mL MBP  3.375 g IntraVENous Q8H  
 0.9% sodium chloride infusion 250 mL  250 mL IntraVENous PRN  
 NOREPINephrine (LEVOPHED) 4 mg in 5% dextrose 250 mL infusion  0.5-16 mcg/min IntraVENous TITRATE  
 0.9% sodium chloride infusion 250 mL  250 mL IntraVENous PRN Review of Systems Unobtainable due to patient status. Objective:  
 
Vitals:  
 02/07/21 0276 02/07/21 6988 02/07/21 0407 02/07/21 6021 BP: 99/62 100/63 100/65 Pulse: 69 69 68 68 Resp: 22 22 20 20 Temp:      
SpO2: 100% 100% 100% 98% Weight:      
Height:      
 
 
Intake/Output Summary (Last 24 hours) at 2/7/2021 4226 Last data filed at 2/7/2021 8321 Gross per 24 hour Intake 3298.03 ml Output 200 ml Net 3098.03 ml Physical Exam:         
Constitutional:  the patient is well developed and in no acute distress EENMT:  Sclera clear, pupils equal, oral mucosa moist 
Respiratory: minimal crackles, no obstruction on FVL on vent Cardiovascular:  RRR without M,G,R 
Gastrointestinal: soft and non-tender; with positive bowel sounds. Musculoskeletal: warm without cyanosis. There is 1+ lower extremity edema. Skin:  no jaundice or rashes Neurologic: no gross neuro deficits Psychiatric:  sedated LINES: 
Peripheral IV 02/06/21 Right Forearm Peripheral IV 02/06/21 Left Antecubital 
Peripheral IV 02/06/21 Right External jugular Peripheral IV 02/06/21 Left Hand Quad Lumen quad lumen 02/06/21 Left Internal jugular Urinary Catheter 02/06/21 2- way Airway - Endotracheal Tube 02/0 DRIPS: 
  dexmedeTOMidine in 0.9 % NaCl (PRECEDEX) 400 mcg/100 mL (4 mcg/mL) infusion soln    
  0.1-1.5 mcg/kg/hr, IV, TITRATE    
  Last Action:  Rate Verify, 0.4 mcg/kg/hr at 02/06 1953    
  fentaNYL in normal saline (pf) 25 mcg/mL infusion    
  0-1.5 mcg/kg/hr, IV, TITRATE    
  Last Action:  New Bag, 50 mcg/hr at 02/06 1759    
  NOREPINephrine (LEVOPHED) 4 mg in 5% dextrose 250 mL infusion    
  0.5-16 mcg/min, IV, TITRATE    
  Last Action:  Rate Change, 4 mcg/min at 02/07 0902    
  octreotide (SANDOSTATIN) 500 mcg in 0.9% sodium chloride 500 mL infusion    
  50 mcg/hr, IV, CONTINUOUS    
  Last Action:  New Bag, 50 mcg/hr at 02/07 0321 CXR: RUL, multifocal infiltrates LAB No results for input(s): GLUCPOC in the last 72 hours. No lab exists for component: [place] Recent Labs 02/07/21 0310 02/07/21 
0126 02/06/21 
1929 02/06/21 
1509 WBC 11.4*  --  14.5* 16.5* HGB 11.3* 11.1* 11.3* 8.7* HCT 33.7*  --  34.4* 26.3*  
*  --  126* 129* INR 1.4  --   --  1.9 Recent Labs 02/07/21 0310 02/06/21 1929 02/06/21 
1509 *  --  135* K 4.1  --  4.1   --  98  
CO2 31  --  27 *  --  88  
 BUN 41*  --  33* CREA 1.24  --  1.24  
MG  --  1.9 2.1 PHOS  --  3.7  --   
CA 7.7*  --  8.2* ALB 1.7*  --  1.7* TBILI 4.2*  --  1.7* ALT 20  --  24 Recent Labs 02/07/21 0435 02/06/21 2037 PHI 7.48* 7.29* PCO2I 40.5 56.1*  
PO2I 274* 113* HCO3I 29.9* 26.8* No results for input(s): LCAD, LAC in the last 72 hours. Recent Labs 02/07/21 0435 02/06/21 2037 PHI 7.48* 7.29* PCO2I 40.5 56.1*  
PO2I 274* 113* HCO3I 29.9* 26.8* Patient Active Problem List  
Diagnosis Code  Hyponatremia E87.1  Liver cirrhosis ([facility] K74.60  Ascites R18.8  Elevated bilirubin R17  Alcohol dependence (HCC) F10.20  
 Nicotine dependence F17.200  Thrombocytopenia ([facility] D69.6  Diarrhea R19.7  DNR (do not resuscitate) 466 8983  Hepatitis C antibody positive in blood R76.8  Anemia D64.9  
 Hematemesis with nausea K92.0  Hemorrhagic shock (HCC) R57.8  Esophageal bleeding K22.8  Encounter for weaning from [facility]) Z99.11  Severe protein-calorie malnutrition ([facility] E43 Assessment:  (Medical Decision Making) Hospital Problems  Date Reviewed: 2/6/2021 Codes Class Noted POA Anemia ICD-10-CM: D64.9 ICD-9-CM: 285.9  2/6/2021 Unknown * (Principal) Hematemesis with nausea ICD-10-CM: K92.0 ICD-9-CM: 578.0, 787.02  2/6/2021 Unknown Hemorrhagic shock ([facility] ICD-10-CM: R57.8 ICD-9-CM: 785.59  2/6/2021 Unknown Esophageal bleeding ICD-10-CM: K22.8 ICD-9-CM: 530.82  2/6/2021 Unknown Encounter for weaning from ventilator [facility]) ICD-10-CM: Z99.11 ICD-9-CM: V46.13  2/6/2021 Unknown Severe protein-calorie malnutrition ([address] ICD-10-CM: F45 ICD-9-CM: 157  2/6/2021 Unknown Hepatitis C antibody positive in blood ICD-10-CM: R76.8 ICD-9-CM: 795.79  12/12/2020 Yes Liver cirrhosis (HCC) ICD-10-CM: K74.60 ICD-9-CM: 571.5  12/10/2020 Yes Ascites ICD-10-CM: R18.8 ICD-9-CM: 789.59  12/10/2020 Yes Plan:  (Medical Decision Making) --continue vent support 
--minimize sedation for extubation in next couple days 
--add albumin for hypotension, cirrhosis, alb 1.7 and poor UOP 
--monitor VAMSI, UOP 
--continue Zosyn for presumed aspiration pneumonia 
--monitor Hg s/p esophageal hemorrhage, GI following, likely rescope in a couple days --octreotide per GI 
--no feeding for now given esophageal bleeding, if unable to extubate could get GI to leave feeding tube when they rescope him. Otherwise could wait until extubated in next 1-2 days Full Code The patient is critically ill with respiratory failure, circulatory failure and requires high complexity decision making for assessment and support including frequent ventilator adjustment , frequent evaluation and titration of therapies , application of advanced monitoring technologies and extensive interpretation of multiple databases Time devoted to patient care services described in this note- 11 min face to face/ 20 min total evaluation time. Cumulative time devoted to patient care services by me for day of service -31 min Miryam Ramirez MD

## 2021-02-07 NOTE — ED NOTES
RT, Hunter King MD at bedside with this nurse, Roberta Kelly, JUJU, Tea Ramirez RN. 
1729 20mg Atomidate given IV at this time by Roberta Kelly RN L forearm IV 
1730 80mg Roccuranium given IV at this time by Roberta Kelly RN L forearm IV 
1731 7.5 ET tubed placed by glidiscope by Hunter King MD 
1732- suction 300ml liquid brown/black emesis from pt oral airway, color change noted 1733- pt O2 saturation 99%, placed on vent by RT, ET tube secured to pt face, VSS 
 
GI updated of pt plan of care and airway by Hunter King MD.

## 2021-02-07 NOTE — ED NOTES
Son at bedside, agitated at situation, states he feels many different plans being told by many different providers, this nurse attempted to explain to pt family that we do many tasks simultaneously in attempt to best care for patient, I.e give blood before going to GI lab. Pt family states he is being mislead on pt status. Haig Blizzard, MD states plan of care for pt and severity of pt condition explained in depth. Pt son continually agitated and yelling at this nurse and charge nurse Jillian Demarco and Haig Blizzard, MD at bedside. Pt family informed we will be intubated pt at this time and to please wait out in waiting area as room space is limited. Pt son states he is going to [facility] this nurse and doctors head in\" for making him leave room. Pt son states he will need \"12  to hold me down to keep me from coming back to get you\". Security has escorted pt son out of pt room so this nurse can care for pt.

## 2021-02-07 NOTE — ED NOTES
Blood admin given, unable to scan in, Unit O positive, expiration 3/12/21, unit # [id number] Given at 500ml/hour, 310ml, in R EJ IV, flushed with 250ml NS bolus. No transfusion reaction. Given emergently by verbal telephone order of Cee Bermudez MD now. Administed by this nurse and Lisa Rhoades RN at bedside.

## 2021-02-07 NOTE — PROGRESS NOTES
Bedside and verbal [facility] Dual signed Fentanyl gtt.

## 2021-02-08 NOTE — PROGRESS NOTES
Chart reviewed s/p admission to ICU Hematemesis with nausea, now intubated/vent. Fio2 35% per RT. Precedex, fentanyl,, levo, and sadostatin gtts. Spoke with Penelope Coyle CNA with [facility] agency. Pt has been staying with her and his son prior to admission. Prior, he lived alone with room mate. DIL, states that son, Steffen Alvarez, has some anger issues which he took out on staff due to be exhausted with care of his dad and trying to work. He is only child. Per MD notes, son is not allowed to visit at present due to being belligerent to staff. Discussed possible d/c needs of HH vs STR. Explained that [facility] may call for assist with LAL[facility] or any other applications pt may be eligibly for. Verbalized understanding. Explained if they filled out financial application we could possibly assist with IRC vs HH. Again verbalized understanding. Did ask if could use [facility], told daughter absolutely if they are willing to work out payment agreement with [facility] and they will accept. NO PCP currently as well and will need referral when closer to d/c. Aware CM to follow for d/c needs/POC. Care Management Interventions PCP Verified by CM: No 
Mode of Transport at Discharge: Other (see comment) Transition of Care Consult (CM Consult): Discharge Planning Discharge Durable Medical Equipment: (walker) Current Support Network: Relative's Home Confirm Follow Up Transport: Family Freedom of Choice List was Provided with Basic Dialogue that Supports the Patient's Individualized Plan of Care/Goals, Treatment Preferences and Shares the Quality Data Associated with the Providers?: Yes [employer] Information Provided?: (no PAYOR source/DECO following) Discharge Location Discharge Placement: Unable to determine at this time

## 2021-02-08 NOTE — ADDENDUM NOTE
Addendum  created 02/08/21 1000 by Verena Paulino, CRNA Flowsheet accepted, Intraprocedure Flowsheets edited

## 2021-02-08 NOTE — PROGRESS NOTES
Initial visit made to patient and a prayer was provided. He was intubated and not alert. Devon Kanner, [address] [facility], [facility]

## 2021-02-08 NOTE — PROGRESS NOTES
Critical Care Daily Progress Note: 2/8/2021 Eric Conklin Admission Date: 2/6/2021 The patient's chart is reviewed and the patient is discussed with the staff. Patient is a 60 y.o.  male presents with hematemesis. Patient per notes from ER and chart has history of ETOH abuse and had dark emesis for 3 days. Found down by family and unresponsive with SBP of 80/42 and hg of 8.7. Per chart son told staff stopped ETOH about 2 months after years of a case a day. Also using NSAIDS, TUMS and pepto bismo. Recently noted to have HCV+ and cirrhotic on US. 
  
Patient seen by GI STAT and felt unstable from likely decompensated cirrhosis and suspected PVT. The did EGD and Anesthesia intubated patient and took to OR.  
  
GI endoscopy noted to have diffusely ulcerated esophagus. No active varices or ulcer noted. Tissue was reported friable and used hemospray instead and coated esophagus.  
  
Get get 2 units of PRBC and ordered FFP and platelets.  
  
Patient now in ICU on Vent and we were asked to take over care now.  
  
Per notes from staff -- SON was belligerent with staff and banned from hospital at this time. Daughter in law -- will be contacted instead for care. -- Bright Vega -- [phone] 
  
Brightkim Vega reports that found on the floor and report he was on the floor about 1 hour. Then shaking and had to let him back to the floor. Very weak. Bright Vega is CRNA and reports patient has not been eating for a while and N/V for 3 days. She is not aware of issues with  and staff. She will be happy to be the  to talk to for the patient's care. Subjective:  
 
Remains intubated, sedated on minimal vent settings. Bleeding appears stable as Hb has remained around 11. He is on/off low-dose levophed. Current Facility-Administered Medications Medication Dose Route Frequency  albumin human 5% (BUMINATE) solution 25 g  25 g IntraVENous Q12H  octreotide (SANDOSTATIN) 500 mcg in 0.9% sodium chloride 500 mL infusion  50 mcg/hr IntraVENous CONTINUOUS  
 0.9% sodium chloride infusion 250 mL  250 mL IntraVENous PRN  
 0.9% sodium chloride infusion 250 mL  250 mL IntraVENous PRN  pantoprazole (PROTONIX) 40 mg in 0.9% sodium chloride 10 mL injection  40 mg IntraVENous Q12H  
 ondansetron (ZOFRAN) injection 4 mg  4 mg IntraVENous Q4H PRN  prochlorperazine (COMPAZINE) with saline injection 10 mg  10 mg IntraVENous Q6H PRN  
 sodium chloride (NS) flush 5-40 mL  5-40 mL IntraVENous PRN  
 albuterol (PROVENTIL VENTOLIN) nebulizer solution 2.5 mg  2.5 mg Inhalation PRN  
 dexmedeTOMidine in 0.9 % NaCl (PRECEDEX) 400 mcg/100 mL (4 mcg/mL) infusion soln  0.1-1.5 mcg/kg/hr IntraVENous TITRATE  fentaNYL in normal saline (pf) 25 mcg/mL infusion  0-1.5 mcg/kg/hr IntraVENous TITRATE  sodium chloride (NS) flush 5-40 mL  5-40 mL IntraVENous Q8H  
 sodium chloride (NS) flush 5-40 mL  5-40 mL IntraVENous PRN  
 acetaminophen (TYLENOL) tablet 650 mg  650 mg Oral Q6H PRN  polyethylene glycol (MIRALAX) packet 17 g  17 g Oral DAILY PRN  promethazine (PHENERGAN) tablet 12.5 mg  12.5 mg Oral Q6H PRN Or  
 ondansetron (ZOFRAN) injection 4 mg  4 mg IntraVENous Q6H PRN  piperacillin-tazobactam (ZOSYN) 3.375 g in 0.9% sodium chloride (MBP/ADV) 100 mL MBP  3.375 g IntraVENous Q8H  
 0.9% sodium chloride infusion 250 mL  250 mL IntraVENous PRN  
 NOREPINephrine (LEVOPHED) 4 mg in 5% dextrose 250 mL infusion  0.5-16 mcg/min IntraVENous TITRATE  
 0.9% sodium chloride infusion 250 mL  250 mL IntraVENous PRN Review of Systems Unobtainable due to patient status. Objective:  
 
Vitals:  
 02/08/21 5200 02/08/21 0745 02/08/21 0933 02/08/21 7243 BP: 108/64 101/60 107/64 Pulse: 72 72 72 71 Resp: 26 27 18 20 Temp: 36.9 °C (98.4 °F) SpO2: 95% 95% 95% 95% Weight:      
Height: Intake/Output Summary (Last 24 hours) at 2/8/2021 0900 Last data filed at 2/8/2021 8497 Gross per 24 hour Intake 2685.12 ml Output 722 ml Net 1963.12 ml Physical Exam:         
Constitutional:  the patient is well developed and in no acute distress EENMT:  Sclera clear, pupils equal, oral mucosa moist 
Respiratory: minimal crackles, no obstruction on FVL on vent Cardiovascular:  RRR without M,G,R 
Gastrointestinal: ascites, distended, Musculoskeletal: warm without cyanosis. There is 1+ lower extremity edema. Skin:  no jaundice or rashes Neurologic: no gross neuro deficits Psychiatric:  sedated LINES: 
Peripheral IV 02/06/21 Right Forearm Peripheral IV 02/06/21 Left Antecubital 
Peripheral IV 02/06/21 Right External jugular Peripheral IV 02/06/21 Left Hand Quad Lumen quad lumen 02/06/21 Left Internal jugular Urinary Catheter 02/06/21 2- way Airway - Endotracheal Tube 02/0 DRIPS: 
  dexmedeTOMidine in 0.9 % NaCl (PRECEDEX) 400 mcg/100 mL (4 mcg/mL) infusion soln    
  0.1-1.5 mcg/kg/hr, IV, TITRATE    
  Last Action:  Rate Verify, 0.4 mcg/kg/hr at 02/06 1953    
  fentaNYL in normal saline (pf) 25 mcg/mL infusion    
  0-1.5 mcg/kg/hr, IV, TITRATE    
  Last Action:  New Bag, 50 mcg/hr at 02/06 1759    
  NOREPINephrine (LEVOPHED) 4 mg in 5% dextrose 250 mL infusion    
  0.5-16 mcg/min, IV, TITRATE    
  Last Action:  Rate Change, 4 mcg/min at 02/07 0902    
  octreotide (SANDOSTATIN) 500 mcg in 0.9% sodium chloride 500 mL infusion    
  50 mcg/hr, IV, CONTINUOUS    
  Last Action:  New Bag, 50 mcg/hr at 02/07 0321 CXR: RUL, multifocal infiltrates LAB No results for input(s): GLUCPOC in the last 72 hours. No lab exists for component: [place] Recent Labs 02/08/21 
0327 02/07/21 
2235 02/07/21 
1658 02/07/21 
0310 02/07/21 
0310 02/06/21 
1929 02/06/21 
1929 02/06/21 
1509 WBC 9.2  --   --   --  11.4*  --  14.5* 16.5*  
 HGB 11.0* 11.2* 11.2*   < > 11.3*   < > 11.3* 8.7* HCT 32.8*  --   --   --  33.7*  --  34.4* 26.3*  
*  --   --   --  126*  --  126* 129* INR  --   --   --   --  1.4  --   --  1.9  
 < > = values in this interval not displayed. Recent Labs 02/08/21 0327 02/07/21 0310 02/06/21 1929 02/06/21 
1509 * 136*  --  135* K 3.4* 4.1  --  4.1  100  --  98  
CO2 29 31  --  27  
GLU 70 142*  --  88 BUN 41* 41*  --  33* CREA 0.90 1.24  --  1.24  
MG  --   --  1.9 2.1 PHOS  --   --  3.7  --   
CA 8.1* 7.7*  --  8.2* ALB  --  1.7*  --  1.7* TBILI  --  4.2*  --  1.7* ALT  --  20  --  24 Recent Labs 02/08/21 0321 02/07/21 
1885 02/06/21 2037 PHI 7.45 7.48* 7.29* PCO2I 38.5 40.5 56.1*  
PO2I 69* 274* 113* HCO3I 26.7* 29.9* 26.8* No results for input(s): LCAD, LAC in the last 72 hours. Recent Labs 02/08/21 0321 02/07/21 
9433 02/06/21 2037 PHI 7.45 7.48* 7.29* PCO2I 38.5 40.5 56.1*  
PO2I 69* 274* 113* HCO3I 26.7* 29.9* 26.8* Patient Active Problem List  
Diagnosis Code  Hyponatremia E87.1  Liver cirrhosis ([facility] K74.60  Ascites R18.8  Elevated bilirubin R17  Alcohol dependence (HCC) F10.20  
 Nicotine dependence F17.200  Thrombocytopenia ([facility] D69.6  Diarrhea R19.7  DNR (do not resuscitate) [phone]  Hepatitis C antibody positive in blood R76.8  Anemia D64.9  
 Hematemesis with nausea K92.0  Hemorrhagic shock (HCC) R57.8  Esophageal bleeding K22.8  Encounter for weaning from ventilator [facility]) Z99.11  Severe protein-calorie malnutrition ([facility] E43 Assessment:  (Medical Decision Making) Hospital Problems  Date Reviewed: 2/6/2021 Codes Class Noted POA Anemia ICD-10-CM: D64.9 ICD-9-CM: 285.9  2/6/2021 Unknown * (Principal) Hematemesis with nausea ICD-10-CM: K92.0 ICD-9-CM: 578.0, 787.02  2/6/2021 Unknown Hemorrhagic shock ([facility] ICD-10-CM: R57.8 ICD-9-CM: 785.59  2/6/2021 Unknown Esophageal bleeding ICD-10-CM: K22.8 ICD-9-CM: 530.82  2/6/2021 Unknown Encounter for weaning from ventilator [facility]) ICD-10-CM: Z99.11 ICD-9-CM: V46.13  2/6/2021 Unknown Severe protein-calorie malnutrition ([facility] ICD-10-CM: Y74 ICD-9-CM: 217  2/6/2021 Unknown Hepatitis C antibody positive in blood ICD-10-CM: R76.8 ICD-9-CM: 795.79  12/12/2020 Yes Liver cirrhosis (HCC) ICD-10-CM: K74.60 ICD-9-CM: 571.5  12/10/2020 Yes Ascites ICD-10-CM: R18.8 ICD-9-CM: 789.59  12/10/2020 Yes Plan:  (Medical Decision Making) 60 M with cirrhosis who presented with UGI bleeding s/p scope revealing friable mucosa and ulcerated esophagus with hemorrhaging s/p intervention with GI 
 
--continue vent support - remains on minimal vent settings currently - will keep intubated until further plans per GI are elucidated  
--will minimize sedation as able for extubation in next couple days 
--add albumin for hypotension, cirrhosis, alb 1.7 and poor UOP 
--monitor VAMSI, UOP - Cr has remained stable  
--continue Zosyn for presumed aspiration pneumonia 
--monitor Hg s/p esophageal hemorrhage -Hb has remained stable, GI following, likely rescope in a couple days? 
--Ascites - may need LVP in the future --octreotide per GI 
--no feeding for now given esophageal bleeding, if unable to extubate could get GI to leave feeding tube when they rescope him? . Otherwise could wait until extubated. Will follow up with GI Full Code Son, Bianca Johnson, can be reached at [phone] - updated this morning The patient is critically ill with respiratory failure, circulatory failure and requires high complexity decision making for assessment and support including frequent ventilator adjustment , frequent evaluation and titration of therapies , application of advanced monitoring technologies and extensive interpretation of multiple databases Cumulative time devoted to patient care services by me for day of service -31 min Roxie Carrera MD

## 2021-02-08 NOTE — PROGRESS NOTES
Ventilator check complete; patient has a #7.5 ET tube secured at the 26 at the lip. Patient is sedated. Patient is not able to follow commands. Breath sounds are diminished. Trachea is midline, Negative for subcutaneous air, and chest excursion is symmetric. Patient is also Negative for cyanosis and is Negative for pitting edema. All alarms are set and audible. Resuscitation bag is at the head of the bed. Ventilator Settings Mode FIO2 Rate Tidal Volume Pressure PEEP I:E Ratio VC+  35 %    560 ml     8 cm H20  1:2.3 Peak airway pressure: 26 cm H2O Minute ventilation: 12.1 l/min ABG: No results for input(s): PH, PCO2, PO2, HCO3 in the last 72 hours.  
 
 
Analilia Lopez, RT

## 2021-02-08 NOTE — PROGRESS NOTES
Ventilator check complete; patient has a #7.5 ET tube secured at the 26 at the lip. Patient is not sedated. Patient is not able to follow commands. Breath sounds are diminished. Trachea is midline, Negative for subcutaneous air, and chest excursion is symmetric. Patient is also Negative for cyanosis and is Negative for pitting edema. All alarms are set and audible. Resuscitation bag is at the head of the bed. Ventilator Settings Mode FIO2 Rate Tidal Volume Pressure PEEP I:E Ratio VC+  40 %   20 560 ml     8 cm H20  1:2.33 Peak airway pressure: 26 cm H2O Minute ventilation: 11.6 l/min Saad Gilliam, RT

## 2021-02-08 NOTE — ACP (ADVANCE CARE PLANNING)
Advance Care Planning General Advance Care Planning (ACP) Conversation Date of Conversation: 2/6/2021 Conducted with: per MD orders, FULL. Healthcare Decision Maker:  
 
 
Son: Iesha Baker -[phone], only child. No LW/HCPOA on file currently. Status . Son legal NOK unless document presented stating otherwise.   
 
 
 
 
Ever Merritt RN

## 2021-02-08 NOTE — PROGRESS NOTES
GI DAILY PROGRESS NOTE Admit Date:  2/6/2021 Today's Date:  2/8/2021 CC: Coffee ground emesis Subjective:  
 
Patient sedated on vent. Still requiring pressors. No active bleeding. Medications:  
Current Facility-Administered Medications Medication Dose Route Frequency  albumin human 5% (BUMINATE) solution 25 g  25 g IntraVENous Q12H  
 octreotide (SANDOSTATIN) 500 mcg in 0.9% sodium chloride 500 mL infusion  50 mcg/hr IntraVENous CONTINUOUS  
 0.9% sodium chloride infusion 250 mL  250 mL IntraVENous PRN  
 0.9% sodium chloride infusion 250 mL  250 mL IntraVENous PRN  pantoprazole (PROTONIX) 40 mg in 0.9% sodium chloride 10 mL injection  40 mg IntraVENous Q12H  
 ondansetron (ZOFRAN) injection 4 mg  4 mg IntraVENous Q4H PRN  prochlorperazine (COMPAZINE) with saline injection 10 mg  10 mg IntraVENous Q6H PRN  
 sodium chloride (NS) flush 5-40 mL  5-40 mL IntraVENous PRN  
 albuterol (PROVENTIL VENTOLIN) nebulizer solution 2.5 mg  2.5 mg Inhalation PRN  
 dexmedeTOMidine in 0.9 % NaCl (PRECEDEX) 400 mcg/100 mL (4 mcg/mL) infusion soln  0.1-1.5 mcg/kg/hr IntraVENous TITRATE  fentaNYL in normal saline (pf) 25 mcg/mL infusion  0-1.5 mcg/kg/hr IntraVENous TITRATE  sodium chloride (NS) flush 5-40 mL  5-40 mL IntraVENous Q8H  
 sodium chloride (NS) flush 5-40 mL  5-40 mL IntraVENous PRN  
 acetaminophen (TYLENOL) tablet 650 mg  650 mg Oral Q6H PRN  polyethylene glycol (MIRALAX) packet 17 g  17 g Oral DAILY PRN  promethazine (PHENERGAN) tablet 12.5 mg  12.5 mg Oral Q6H PRN Or  
 ondansetron (ZOFRAN) injection 4 mg  4 mg IntraVENous Q6H PRN  piperacillin-tazobactam (ZOSYN) 3.375 g in 0.9% sodium chloride (MBP/ADV) 100 mL MBP  3.375 g IntraVENous Q8H  
 0.9% sodium chloride infusion 250 mL  250 mL IntraVENous PRN  
 NOREPINephrine (LEVOPHED) 4 mg in 5% dextrose 250 mL infusion  0.5-16 mcg/min IntraVENous TITRATE  0.9% sodium chloride infusion 250 mL  250 mL IntraVENous PRN Review of Systems: 
Unable to obtain Diet:  NPO Objective:  
Vitals: 
Visit Vitals /65 Pulse 71 Temp 98.4 °F (36.9 °C) Resp 21 Ht 6' 7\" (2.007 m) Wt 81.6 kg (180 lb) SpO2 96% BMI 20.28 kg/m² Intake/Output: 
02/08 0701 - 02/08 1900 In: -  
Out: 150 [Urine:150] 02/06 1901 - 02/08 0700 In: 5673.2 [I.V.:4529.2] Out: 955 [Urine:955] Exam: 
General appearance: intubated, sedated Lungs: clear to auscultation bilaterally anteriorly Heart: regular rate and rhythm Abdomen: soft, non-tender. Distended. Bowel sounds normal. No masses, no organomegaly Extremities: extremities normal, atraumatic, no cyanosis. +LE edema Neuro:  sedated Data Review (Labs):   
Recent Labs 02/08/21 
4673 02/08/21 
0327 02/07/21 
2235 02/07/21 
1658 02/07/21 
1007 02/07/21 
0310 02/07/21 
0126 02/06/21 
1929 02/06/21 
1509 WBC  --  9.2  --   --   --  11.4*  --  14.5* 16.5* HGB 11.1* 11.0* 11.2* 11.2* 11.5* 11.3* 11.1* 11.3* 8.7* HCT  --  32.8*  --   --   --  33.7*  --  34.4* 26.3*  
PLT  --  100*  --   --   --  126*  --  126* 129* MCV  --  97.9*  --   --   --  97.4  --  103.6* 111.0* NA  --  136*  --   --   --  136*  --   --  135* K  --  3.4*  --   --   --  4.1  --   --  4.1 CL  --  102  --   --   --  100  --   --  98  
CO2  --  29  --   --   --  31  --   --  27 BUN  --  41*  --   --   --  41*  --   --  33* CREA  --  0.90  --   --   --  1.24  --   --  1.24  
CA  --  8.1*  --   --   --  7.7*  --   --  8.2* MG  --   --   --   --   --   --   --  1.9 2.1 GLU  --  70  --   --   --  142*  --   --  88  
AP  --   --   --   --   --  56  --   --  65 ALT  --   --   --   --   --  20  --   --  24  
AST  --   --   --   --   --  37  --   --  46* ALB  --   --   --   --   --  1.7*  --   --  1.7* TP  --   --   --   --   --  4.5*  --   --  5.0*  
LPSE  --   --   --   --   --   --   --   --  72* PTP  --   --   --   --   --  16.9*  --   --  21.8* INR  --   --   --   --   --  1.4  --   --  1.9 APTT  --   --   --   --   --   --   --   --  27.4 Radiology: Xr Chest Sngl V Result Date: 2/8/2021 Mildly improved bilateral lung infiltrates. Xr Chest Sngl V Result Date: 2/7/2021 Unchanged bilateral lung infiltrates. Xr Chest Sngl V Result Date: 2/6/2021 1. Left IJ central line tip projects over the midline upper mediastinum. No pneumothorax. 2. Stable multifocal pneumonia. Xr Chest Iram Gaudier Result Date: 2/6/2021 1. Endotracheal tube is in appropriate position. 2. Emphysematous lungs with heterogeneous airspace opacities of the right upper lobe, right lower lobe, left lower lobe suspicious for multifocal pneumonia. Esophagogastroduodenoscopy 
  
DATE of PROCEDURE: 2/6/2021 
  
MEDICATIONS ADMINISTERED: MAC 
  
INSTRUMENT: GIF 
  
PROCEDURE:  After obtaining informed consent, the patient was placed in the left lateral position and sedated. The endoscope was advanced under direct vision without difficulty. The esophagus, stomach (including retroflexed views) and duodenum were evaluated. The patient was taken to the recovery area in stable condition. 
  
FINDINGS: 
  
OROPHARYNX: Cords and pyriform recesses normal. 
  
ESOPHAGUS: The entire esophagus appeared abnormal. It was diffusely ulcerated given its yellowish hue and some of this appeared necrotic. Diffuse bleeding was seen. One identified focus of bleeding was identified and controlled with APC. However, shortly thereafter the esophagus began to bleed from multiple locations. The tissue was so friable that clipping, banding, or gold probe were not likely to be effective. As such I elected to use hemospray. The entire length the of the esophagus was coated with the material.  Hemostasis appeared to be achieved although given the amount of spray applied it was difficult to see.  This does not appear to be a variceal bleed. 
  
 STOMACH: The fundus on antegrade and retroflex views is normal. The body, antrum and pylorus are normal.  Old blood was seen. No gastric varies were able to be seen, although examination was somewhat limited given the amount of blood in the stomach.   
  
DUODENUM: The bulb and second portions are normal.  
  
Estimated blood loss: 0-minimal  
  PLAN: 
1. Transfuse 2. Continue ppi, octreortide 3. Supportive care 4. Will follow Assessment:  
 
Principal Problem: 
  Hematemesis with nausea (2/6/2021) Active Problems: 
  Liver cirrhosis ([address] (12/10/2020) Ascites (12/10/2020) Hepatitis C antibody positive in blood (12/12/2020) Anemia (2/6/2021) Hemorrhagic shock ([address] (2/6/2021) Esophageal bleeding (2/6/2021) Encounter for weaning from ventilator [facility]) (2/6/2021) Severe protein-calorie malnutrition ([address] (2/6/2021) Plan:  
 
 
59yo WM with [address] abuse presenting with acute upper GI bleed found to have a diffusely ulcerated esophagus s/p hemospray. - Hgb stable over past several days - Continue Octreotide gtt today but d/c tomorrow am 
- Continue supportive care - Transfuse as needed - Will consider re-scoping in a few days depending on his clinical progression. 
- Large volume paracentesis will be needed once more stable. - Continue IV albumin (Cr improving) Chhaya Cortes MD

## 2021-02-09 NOTE — PROGRESS NOTES
Critical Care Daily Progress Note: 2/9/2021 Jerod Crenshaw Admission Date: 2/6/2021 The patient's chart is reviewed and the patient is discussed with the staff. Patient is a 60 y.o.  male presents with hematemesis. Patient per notes from ER and chart has history of ETOH abuse and had dark emesis for 3 days. Found down by family and unresponsive with SBP of 80/42 and hg of 8.7. Per chart son told staff stopped ETOH about 2 months after years of a case a day. Also using NSAIDS, TUMS and pepto bismo. Recently noted to have HCV+ and cirrhotic on US. 
  
Patient seen by GI STAT and felt unstable from likely decompensated cirrhosis and suspected PVT. The did EGD and Anesthesia intubated patient and took to OR.  
  
GI endoscopy noted to have diffusely ulcerated esophagus. No active varices or ulcer noted. Tissue was reported friable and used hemospray instead and coated esophagus.  
  
Get get 2 units of PRBC and ordered FFP and platelets.  
  
Patient now in ICU on Vent and we were asked to take over care now.  
  
 
  
    
Subjective: No major issues overnight. He remains on minimal vent settings and Hb has remained stable. Current Facility-Administered Medications Medication Dose Route Frequency  NUTRITIONAL SUPPORT ELECTROLYTE PRN ORDERS   Does Not Apply PRN  potassium chloride 20 mEq in 100 ml IVPB  20 mEq IntraVENous Q2H  
 albumin human 5% (BUMINATE) solution 25 g  25 g IntraVENous Q12H  
 octreotide (SANDOSTATIN) 500 mcg in 0.9% sodium chloride 500 mL infusion  50 mcg/hr IntraVENous CONTINUOUS  
 0.9% sodium chloride infusion 250 mL  250 mL IntraVENous PRN  
 0.9% sodium chloride infusion 250 mL  250 mL IntraVENous PRN  pantoprazole (PROTONIX) 40 mg in 0.9% sodium chloride 10 mL injection  40 mg IntraVENous Q12H  
 ondansetron (ZOFRAN) injection 4 mg  4 mg IntraVENous Q4H PRN  
  prochlorperazine (COMPAZINE) with saline injection 10 mg  10 mg IntraVENous Q6H PRN  
 sodium chloride (NS) flush 5-40 mL  5-40 mL IntraVENous PRN  
 albuterol (PROVENTIL VENTOLIN) nebulizer solution 2.5 mg  2.5 mg Inhalation PRN  
 dexmedeTOMidine in 0.9 % NaCl (PRECEDEX) 400 mcg/100 mL (4 mcg/mL) infusion soln  0.1-1.5 mcg/kg/hr IntraVENous TITRATE  fentaNYL in normal saline (pf) 25 mcg/mL infusion  0-1.5 mcg/kg/hr IntraVENous TITRATE  sodium chloride (NS) flush 5-40 mL  5-40 mL IntraVENous Q8H  
 sodium chloride (NS) flush 5-40 mL  5-40 mL IntraVENous PRN  
 acetaminophen (TYLENOL) tablet 650 mg  650 mg Oral Q6H PRN  polyethylene glycol (MIRALAX) packet 17 g  17 g Oral DAILY PRN  promethazine (PHENERGAN) tablet 12.5 mg  12.5 mg Oral Q6H PRN Or  
 ondansetron (ZOFRAN) injection 4 mg  4 mg IntraVENous Q6H PRN  piperacillin-tazobactam (ZOSYN) 3.375 g in 0.9% sodium chloride (MBP/ADV) 100 mL MBP  3.375 g IntraVENous Q8H  
 0.9% sodium chloride infusion 250 mL  250 mL IntraVENous PRN  
 NOREPINephrine (LEVOPHED) 4 mg in 5% dextrose 250 mL infusion  0.5-16 mcg/min IntraVENous TITRATE  
 0.9% sodium chloride infusion 250 mL  250 mL IntraVENous PRN Review of Systems Unobtainable due to patient status. Objective:  
 
Vitals:  
 02/09/21 4668 02/09/21 0730 02/09/21 9209 02/09/21 9648 BP: 98/66 95/67 97/66 Pulse: (!) 57 (!) 59 (!) 58 (!) 58 Resp: 20 20 20 20 Temp:      
SpO2: 96% 97% 97% 97% Weight:      
Height:      
 
 
Intake/Output Summary (Last 24 hours) at 2/9/2021 3884 Last data filed at 2/9/2021 4308 Gross per 24 hour Intake 3265.8 ml Output 855 ml Net 2410.8 ml Physical Exam:         
Constitutional: frail appearing EENMT:  Sclera clear, pupils equal, oral mucosa moist 
Respiratory: ETT, vented breath sounds, no wheezing, minimal crackles,  
Cardiovascular:  Mildly marie, regular rhythm Gastrointestinal: ascites, distended Musculoskeletal: warm without cyanosis. No significant LE edema Skin: no appreciable ecchymoses Neurologic: no gross neuro deficits Psychiatric:  sedated LINES: 
Quad Lumen quad lumen 02/06/21 Left Internal jugular Urinary Catheter 02/06/21 2- way Airway - Endotracheal Tube 02/6 DRIPS: 
  dexmedeTOMidine in 0.9 % NaCl (PRECEDEX) 400 mcg/100 mL (4 mcg/mL) infusion soln    
  0.1-1.5 mcg/kg/hr, IV, TITRATE    
  Last Action:  Rate Verify, 0.4 mcg/kg/hr at 02/06 1953    
  fentaNYL in normal saline (pf) 25 mcg/mL infusion    
  0-1.5 mcg/kg/hr, IV, TITRATE    
  Last Action:  New Bag, 50 mcg/hr at 02/06 1759    
  NOREPINephrine (LEVOPHED) 4 mg in 5% dextrose 250 mL infusion    
  0.5-16 mcg/min, IV, TITRATE    
  Last Action:  Rate Change, 4 mcg/min at 02/07 0902    
  octreotide (SANDOSTATIN) 500 mcg in 0.9% sodium chloride 500 mL infusion    
  50 mcg/hr, IV, CONTINUOUS    
  Last Action:  New Bag, 50 mcg/hr at 02/07 0321 CXR: RUL, multifocal infiltrates LAB No results for input(s): GLUCPOC in the last 72 hours. No lab exists for component: [place] Recent Labs 02/09/21 
8150 02/08/21 
2763 02/08/21 
0327 02/07/21 
0310 02/07/21 
0310 02/06/21 
1509 02/06/21 
1509 WBC 6.6  --  9.2  --  11.4*   < > 16.5* HGB 10.6* 11.1* 11.0*   < > 11.3*   < > 8.7* HCT 31.7*  --  32.8*  --  33.7*   < > 26.3*  
PLT 71*  --  100*  --  126*   < > 129* INR 1.8  --   --   --  1.4  --  1.9  
 < > = values in this interval not displayed. Recent Labs 02/09/21 
6805 02/08/21 
0327 02/07/21 
0310 02/06/21 
1929 02/06/21 
1509  136* 136*  --  135*  
K 2.9* 3.4* 4.1  --  4.1  102 100  --  98  
CO2 28 29 31  --  27  
GLU 66 70 142*  --  88 BUN 30* 41* 41*  --  33* CREA 0.78* 0.90 1.24  --  1.24  
MG 2.2  --   --  1.9 2.1 PHOS 1.8*  --   --  3.7  --   
CA 8.2* 8.1* 7.7*  --  8.2* ALB 2.5*  --  1.7*  --  1.7* TBILI 5.4*  --  4.2*  --  1.7* ALT 13  --  20  --  24  
 
 Recent Labs 02/09/21 
0210 02/08/21 
0321 02/07/21 
6927 PHI 7.45 7.45 7.48* PCO2I 35.6 38.5 40.5 PO2I 71* 69* 274* HCO3I 24.4 26.7* 29.9* No results for input(s): LCAD, LAC in the last 72 hours. Recent Labs 02/09/21 
0210 02/08/21 
0321 02/07/21 
6230 PHI 7.45 7.45 7.48* PCO2I 35.6 38.5 40.5 PO2I 71* 69* 274* HCO3I 24.4 26.7* 29.9* Patient Active Problem List  
Diagnosis Code  Hyponatremia E87.1  [facility] cirrhosis ([facility] K74.60  Ascites R18.8  Elevated bilirubin R17  Alcohol dependence (HCC) F10.20  
 Nicotine dependence F17.200  Thrombocytopenia ([facility] D69.6  Diarrhea R19.7  DNR (do not resuscitate) [phone]  Hepatitis C antibody positive in blood R76.8  Anemia D64.9  
 Hematemesis with nausea K92.0  Hemorrhagic shock (HCC) R57.8  Esophageal bleeding K22.8  Encounter for weaning from ventilator [facility]) Z99.11  Severe protein-calorie malnutrition ([facility] E43 Assessment:  (Medical Decision Making) Hospital Problems  Date Reviewed: 2/6/2021 Codes Class Noted POA Anemia ICD-10-CM: D64.9 ICD-9-CM: 285.9  2/6/2021 Unknown * (Principal) Hematemesis with nausea ICD-10-CM: K92.0 ICD-9-CM: 578.0, 787.02  2/6/2021 Unknown Hemorrhagic shock ([facility] ICD-10-CM: R57.8 ICD-9-CM: 785.59  2/6/2021 Unknown Esophageal bleeding ICD-10-CM: K22.8 ICD-9-CM: 530.82  2/6/2021 Unknown Encounter for weaning from ventilator [facility]) ICD-10-CM: Z99.11 ICD-9-CM: V46.13  2/6/2021 Unknown Severe protein-calorie malnutrition ([facility] ICD-10-CM: A97 ICD-9-CM: 965  2/6/2021 Unknown Hepatitis C antibody positive in blood ICD-10-CM: R76.8 ICD-9-CM: 795.79  12/12/2020 Yes Liver cirrhosis (HCC) ICD-10-CM: K74.60 ICD-9-CM: 571.5  12/10/2020 Yes Ascites ICD-10-CM: R18.8 ICD-9-CM: 789.59  12/10/2020 Yes Plan:  (Medical Decision Making) 60 M with cirrhosis who presented with UGI bleeding s/p scope revealing friable mucosa and ulcerated esophagus with hemorrhaging s/p intervention with GI 
 
--continue vent support - remains on minimal vent settings currently - if no plan for procedure from GI today will wake and wean in anticipation of extubation  
--albumin for hypotension, cirrhosis, alb 1.7 and poor UOP 
--monitor VAMSI, UOP - Cr has improved  
--continue Zosyn for presumed aspiration pneumonia 
--monitor Hg s/p esophageal hemorrhage from diffusely ulcerated esophagus -Hb has remained stable, GI following - possibly rescoping soon? --Thrombocytopenia - 71k this morning - will continue to monitor  
--Ascites - may need LVP in the future --octreotide per GI 
--no feeding for now given esophageal bleeding. Will follow up with GI regarding procedure and then possible extubation/feeding in the near future PPX: on protonix, SCDs, will consider starting chemical DVT ppx pending GI's plans today and platelet trend Full Code Son, Karen Lopez, can be reached at [phone] - attempted to call this morning but he did not answer. Addendum - spoke with the son later in the morning and updated on his father's clinical status. The patient is critically ill with respiratory failure, circulatory failure and requires high complexity decision making for assessment and support including frequent ventilator adjustment , frequent evaluation and titration of therapies , application of advanced monitoring technologies and extensive interpretation of multiple databases Cumulative time devoted to patient care services by me for day of service -32 min Kristina Khan MD

## 2021-02-09 NOTE — PROGRESS NOTES
Ventilator check complete; patient has a #7.5 ET tube secured at the 26 at the lip. Patient is sedated. Patient is not able to follow commands. Breath sounds are coarse and diminished. Trachea is midline, Negative for subcutaneous air, and chest excursion is symmetric. Patient is also Negative for cyanosis and is Negative for pitting edema. All alarms are set and audible. Resuscitation bag is at the head of the bed. Ventilator Settings Mode FIO2 Rate Tidal Volume Pressure PEEP I:E Ratio VC+  35 %    560 ml     8 cm H20  1:2.3 Peak airway pressure: 23 cm H2O Minute ventilation: 13.6 l/min RT Marilyn

## 2021-02-09 NOTE — PROGRESS NOTES
Bedside report received from [facility]. Patient assessed and repositioned. Gtts verified.

## 2021-02-09 NOTE — PROGRESS NOTES
[facility] Progress Note Admit Date:  2/6/2021 Today's Date:  2/9/2021 CC: Coffee ground emesis Subjective:  
 
Patient: No active bleeding. Weaning off pressors. Primary team discussing extubation. Medications:  
Current Facility-Administered Medications Medication Dose Route Frequency  NUTRITIONAL SUPPORT ELECTROLYTE PRN ORDERS   Does Not Apply PRN  potassium chloride 20 mEq in 100 ml IVPB  20 mEq IntraVENous Q2H  
 albumin human 5% (BUMINATE) solution 25 g  25 g IntraVENous Q12H  
 octreotide (SANDOSTATIN) 500 mcg in 0.9% sodium chloride 500 mL infusion  50 mcg/hr IntraVENous CONTINUOUS  
 0.9% sodium chloride infusion 250 mL  250 mL IntraVENous PRN  
 0.9% sodium chloride infusion 250 mL  250 mL IntraVENous PRN  pantoprazole (PROTONIX) 40 mg in 0.9% sodium chloride 10 mL injection  40 mg IntraVENous Q12H  
 ondansetron (ZOFRAN) injection 4 mg  4 mg IntraVENous Q4H PRN  prochlorperazine (COMPAZINE) with saline injection 10 mg  10 mg IntraVENous Q6H PRN  
 sodium chloride (NS) flush 5-40 mL  5-40 mL IntraVENous PRN  
 albuterol (PROVENTIL VENTOLIN) nebulizer solution 2.5 mg  2.5 mg Inhalation PRN  
 dexmedeTOMidine in 0.9 % NaCl (PRECEDEX) 400 mcg/100 mL (4 mcg/mL) infusion soln  0.1-1.5 mcg/kg/hr IntraVENous TITRATE  fentaNYL in normal saline (pf) 25 mcg/mL infusion  0-1.5 mcg/kg/hr IntraVENous TITRATE  sodium chloride (NS) flush 5-40 mL  5-40 mL IntraVENous Q8H  
 sodium chloride (NS) flush 5-40 mL  5-40 mL IntraVENous PRN  
 acetaminophen (TYLENOL) tablet 650 mg  650 mg Oral Q6H PRN  polyethylene glycol (MIRALAX) packet 17 g  17 g Oral DAILY PRN  promethazine (PHENERGAN) tablet 12.5 mg  12.5 mg Oral Q6H PRN Or  
 ondansetron (ZOFRAN) injection 4 mg  4 mg IntraVENous Q6H PRN  piperacillin-tazobactam (ZOSYN) 3.375 g in 0.9% sodium chloride (MBP/ADV) 100 mL MBP  3.375 g IntraVENous Q8H  
  0.9% sodium chloride infusion 250 mL  250 mL IntraVENous PRN  
 NOREPINephrine (LEVOPHED) 4 mg in 5% dextrose 250 mL infusion  0.5-16 mcg/min IntraVENous TITRATE  
 0.9% sodium chloride infusion 250 mL  250 mL IntraVENous PRN Review of Systems: ROS was obtained, with pertinent positives as listed above. No chest pain or SOB. Diet:  NPO Objective:  
Vitals: 
Visit Vitals /71 Pulse (!) 59 Temp 97.6 °F (36.4 °C) Resp 27 Ht 6' 7\" (2.007 m) Wt 81.6 kg (180 lb) SpO2 98% BMI 20.28 kg/m² Intake/Output: 
No intake/output data recorded. 02/07 1901 - 02/09 0700 In: 5024.8 [I.V.:5024.8] Out: 1380 [WIWWY:[id number]] Exam: 
General appearance: alert, cooperative, no distress ALERT ON VENT; JAUNDICE; NURSING AT BEDSIDE Lungs: ON VENT Heart: BRADYCARDIC Abdomen: soft, non-tender. MODERATE DISTENTION Bowel sounds normal. No masses, no organomegaly Extremities: extremities normal, atraumatic, no cyanosis 1+ BILATERAL; SCD IN PLACE Neuro:  alert BUT UNABLE TO FOLLOW COMMANDS. ASTERIXIS Data Review (Labs):   
Recent Labs 02/09/21 
9021 02/08/21 
2463 02/08/21 
0327 02/07/21 
2235 02/07/21 
1658 02/07/21 
1007 02/07/21 
0310 02/07/21 
0126 02/06/21 
1929 02/06/21 
1509 WBC 6.6  --  9.2  --   --   --  11.4*  --  14.5* 16.5* HGB 10.6* 11.1* 11.0* 11.2* 11.2* 11.5* 11.3* 11.1* 11.3* 8.7* HCT 31.7*  --  32.8*  --   --   --  33.7*  --  34.4* 26.3*  
PLT 71*  --  100*  --   --   --  126*  --  126* 129* MCV 97.8  --  97.9*  --   --   --  97.4  --  103.6* 111.0*   --  136*  --   --   --  136*  --   --  135*  
K 2.9*  --  3.4*  --   --   --  4.1  --   --  4.1   --  102  --   --   --  100  --   --  98  
CO2 28  --  29  --   --   --  31  --   --  27 BUN 30*  --  41*  --   --   --  41*  --   --  33* CREA 0.78*  --  0.90  --   --   --  1.24  --   --  1.24  
CA 8.2*  --  8.1*  --   --   --  7.7*  --   --  8.2* MG 2.2  --   --   --   --   --   --   --  1.9 2.1 GLU 66  --  70  --   --   --  142*  --   --  88  
AP 41*  --   --   --   --   --  56  --   --  65 ALT 13  --   --   --   --   --  20  --   --  24 TBILI 5.4*  --   --   --   --   --  4.2*  --   --  1.7* CBIL 3.4*  --   --   --   --   --   --   --   --   --   
ALB 2.5*  --   --   --   --   --  1.7*  --   --  1.7* TP 4.7*  --   --   --   --   --  4.5*  --   --  5.0*  
LPSE  --   --   --   --   --   --   --   --   --  72* PTP 21.0*  --   --   --   --   --  16.9*  --   --  21.8* INR 1.8  --   --   --   --   --  1.4  --   --  1.9 APTT  --   --   --   --   --   --   --   --   --  27.4 Assessment:  
 
Principal Problem: 
  Hematemesis with nausea (2/6/2021) Active Problems: 
  Liver cirrhosis ([facility] (12/10/2020) Ascites (12/10/2020) Hepatitis C antibody positive in blood (12/12/2020) Anemia (2/6/2021) Hemorrhagic shock ([facility] (2/6/2021) Esophageal bleeding (2/6/2021) Encounter for weaning from ventilator [facility]) (2/6/2021) Severe protein-calorie malnutrition ([facility] (2/6/2021) 57yo WM with [place] [address] abuse presenting with acute upper GI bleed found to have a diffusely ulcerated esophagus s/p hemospray. He has had no further bleeding and his Hgb is stable at 10.6. Unfortunately, his T bili and PT INR are trending up. Hepatitis discriminant function 34 today. Ammonia 55 on 2/6 with confusion and asterixis. Plan:  
 
-will add Lactulose enemas for hepatic encephalopathy 
-Octreotide drip to be discontinued today after 72 hours completion (1516) -Do not recommend Dobhoff with his esophagitis. If unable to tolerate po after extubation, may need to be started on TPN. -No plans for repeat EGD right now. Will allow esophagitis further time to heal prior to re-scoping 
-Continue Pantoprazole 40mg IV bid 
-Serial H&H and transfuse as needed 
-Will discuss with Dr. Mahsa Galarza and primary team if there is role for steroid usage for his ETOH hepatitis 
-continue IV albumin -Eventually, will need Large volume paracentesis when more stable 
-Daily labs Ezella Buff. Chrisandra Buerger in collaboration with Dr. Mignon Corona [facility]

## 2021-02-09 NOTE — PROGRESS NOTES
Ventilator check complete; patient has a #8. 0 ET tube secured at the 26 at the lip. Patient is sedated. Patient is not able to follow commands. Breath sounds are coarse. Trachea is midline, Negative for subcutaneous air, and chest excursion is symmetric. Patient is also negative for cyanosis and is Positive for pitting edema. All alarms are set and audible. Resuscitation bag is at the head of the bed. Ventilator Settings Mode FIO2 Rate Tidal Volume Pressure PEEP I:E Ratio VC+  36 %   20 0.56 ml     8 cm H20  1:2.3 Peak airway pressure: 27 cm H2O Minute ventilation: 11.4 l/min ABG: No results for input(s): PH, PCO2, PO2, HCO3 in the last 72 hours.  
 
 
Aida Galvan, RT

## 2021-02-09 NOTE — PROGRESS NOTES
A follow up visit was made to the patient. Emotional support, spiritual presence and  
prayer were provided for the patient. His nurse shared that he would be extubated today. The patient was alert. Jennifer Bravo, [address] [facility], [facility]

## 2021-02-10 NOTE — PROGRESS NOTES
Ventilator check complete; patient has a #8. 0 ET tube secured at the 23 at the lip. Patient is sedated. Patient is not able to follow commands. Breath sounds are coarse and diminished. Trachea is midline, Negative for subcutaneous air, and chest excursion is symmetric. Patient is also Negative for cyanosis and is Negative for pitting edema. All alarms are set and audible. Resuscitation bag is at the head of the bed. Ventilator Settings Mode FIO2 Rate Tidal Volume Pressure PEEP I:E Ratio VC+  75 %    500 ml  12 cm H2O  8 cm H20  1:2.0 Peak airway pressure: 26 cm H2O Minute ventilation: 15.1 l/min Jam Loya, RT

## 2021-02-10 NOTE — PROGRESS NOTES
Bedside and Verbal shift change report given to Hugo Nunes RN (oncoming nurse) by Deniz Prince RN (offgoing nurse). Report included the following information SBAR, Kardex, Intake/Output, MAR, Recent Results, Cardiac Rhythm NSR and Alarm Parameters .

## 2021-02-10 NOTE — PROCEDURES
Emergent Intubation Performed by: Subhash Grant MD 
Authorized by: Subhash Grant MD  
 
Emergent Intubation: Location:  ICU Date/Time:  2/10/2021 9:38 AM 
Indications:  Respiratory failure Spontaneous Ventilation: present Expected sedation level: somnolent Preoxygenated: Yes Airway Documentation: Airway:  ETT - Cuffed Advanced Technique:  Glide scope Insertion Site:  Oral 
Blade Type:  Rhonda Blade Size:  4 ETT size (mm):  8.0 ETT Line Josr:  Gumline ETT Insertion depth (cm):  23 Placement verified by: auscultation, EtCO2 and BBS Attempts:  1 Difficult airway: No

## 2021-02-10 NOTE — PROGRESS NOTES
Pharmacokinetic Consult to Pharmacist 
 
Zohaib Casas is a 60 y.o. male being treated with vancomycin. Height: 6' 7\" (200.7 cm)  Weight: 107 kg (235 lb 14.3 oz) Lab Results Component Value Date/Time BUN 31 (H) 02/10/2021 03:19 AM  
 Creatinine 0.83 02/10/2021 03:19 AM  
 WBC 4.5 02/10/2021 03:19 AM  
  
Estimated Creatinine Clearance: 119.2 mL/min (based on SCr of 0.83 mg/dL). CULTURES: 
pending Day 1 of vancomycin. Goal trough is 15-20. Vancomycin dose initiated at 2000 mg x 1, then 1500 mg q 12 hours. Will continue to follow patient and order levels when clinically indicated. Thank you, Jo James, PharmD, [facility] Clinical Pharmacist 
[phone]

## 2021-02-10 NOTE — PROGRESS NOTES
Critical Care Daily Progress Note: 2/10/2021 Hector Dukes Admission Date: 2/6/2021 The patient's chart is reviewed and the patient is discussed with the staff. Patient is a 60 y.o.  male presents with hematemesis. Patient per notes from ER and chart has history of ETOH abuse and had dark emesis for 3 days. Found down by family and unresponsive with SBP of 80/42 and hg of 8.7. Per chart son told staff stopped ETOH about 2 months after years of a case a day. Also using NSAIDS, TUMS and pepto bismo. Recently noted to have HCV+ and cirrhotic on US. 
  
Patient seen by GI STAT and felt unstable from likely decompensated cirrhosis and suspected PVT. The did EGD and Anesthesia intubated patient and took to OR.  
  
GI endoscopy noted to have diffusely ulcerated esophagus. No active varices or ulcer noted. Tissue was reported friable and used hemospray instead and coated esophagus.  
  
Get get 2 units of PRBC and ordered FFP and platelets.  
  
Patient now in ICU on Vent and we were asked to take over care now.  
  
 
  
    
Subjective:  
 
Patient was extubated yesterday without issues and had some intermittent agitation. Had worsening of his oxygenation early this morning. CXR with significant interval worsening airspace disease bilaterally (R >L). Hb has remained stable and patient still intermittently agitated. Current Facility-Administered Medications Medication Dose Route Frequency  potassium chloride 20 mEq in 100 ml IVPB  20 mEq IntraVENous Q2H  
 haloperidol lactate (HALDOL) injection 2 mg  2 mg IntraVENous Q8H PRN  
 NUTRITIONAL SUPPORT ELECTROLYTE PRN ORDERS   Does Not Apply PRN  
 lactulose (CHRONULAC) 10 gram/15 mL solution 300 mL  200 g Rectal TID  nicotine (NICODERM CQ) 14 mg/24 hr patch 1 Patch  1 Patch TransDERmal Q24H  
 albuterol-ipratropium (DUO-NEB) 2.5 MG-0.5 MG/3 ML  3 mL Nebulization Q6H PRN  
  0.9% sodium chloride infusion 250 mL  250 mL IntraVENous PRN  
 0.9% sodium chloride infusion 250 mL  250 mL IntraVENous PRN  pantoprazole (PROTONIX) 40 mg in 0.9% sodium chloride 10 mL injection  40 mg IntraVENous Q12H  
 ondansetron (ZOFRAN) injection 4 mg  4 mg IntraVENous Q4H PRN  prochlorperazine (COMPAZINE) with saline injection 10 mg  10 mg IntraVENous Q6H PRN  
 sodium chloride (NS) flush 5-40 mL  5-40 mL IntraVENous PRN  
 albuterol (PROVENTIL VENTOLIN) nebulizer solution 2.5 mg  2.5 mg Inhalation PRN  
 sodium chloride (NS) flush 5-40 mL  5-40 mL IntraVENous Q8H  
 sodium chloride (NS) flush 5-40 mL  5-40 mL IntraVENous PRN  
 acetaminophen (TYLENOL) tablet 650 mg  650 mg Oral Q6H PRN  polyethylene glycol (MIRALAX) packet 17 g  17 g Oral DAILY PRN  promethazine (PHENERGAN) tablet 12.5 mg  12.5 mg Oral Q6H PRN Or  
 ondansetron (ZOFRAN) injection 4 mg  4 mg IntraVENous Q6H PRN  piperacillin-tazobactam (ZOSYN) 3.375 g in 0.9% sodium chloride (MBP/ADV) 100 mL MBP  3.375 g IntraVENous Q8H  
 0.9% sodium chloride infusion 250 mL  250 mL IntraVENous PRN  
 NOREPINephrine (LEVOPHED) 4 mg in 5% dextrose 250 mL infusion  0.5-16 mcg/min IntraVENous TITRATE  
 0.9% sodium chloride infusion 250 mL  250 mL IntraVENous PRN Review of Systems Unobtainable due to patient status. Objective:  
 
Vitals:  
 02/10/21 7066 02/10/21 0276 02/10/21 0458 02/10/21 0510 BP: (!) 100/58 109/61 (!) 89/55 Pulse: 63 66 66 Resp: 30 (!) 32 29 Temp:      
SpO2: 91% 90% 93% Weight:    107 kg (235 lb 14.3 oz) Height:      
 
 
Intake/Output Summary (Last 24 hours) at 2/10/2021 4273 Last data filed at 2/10/2021 2592 Gross per 24 hour Intake 1466.72 ml Output 1665 ml Net -198.28 ml Physical Exam:         
Constitutional: frail appearing, intermittent agitation EENMT:  Mildly jaundiced, pupils equal, oral mucosa moist 
Respiratory: face mask, coarse breath sounds bilaterally Cardiovascular:  RRR, no M/R/G Gastrointestinal: ascites, distended Musculoskeletal: warm without cyanosis. No significant LE edema Skin: no appreciable ecchymoses Neurologic: no focal deficits Psychiatric:  Intermittent agitation LINES: 
Quad Lumen quad lumen 02/06/21 Left Internal jugular Urinary Catheter 02/06/21 2- way Airway - Endotracheal Tube 02/6 - extubated 2/9 DRIPS: 
  dexmedeTOMidine in 0.9 % NaCl (PRECEDEX) 400 mcg/100 mL (4 mcg/mL) infusion soln    
  0.1-1.5 mcg/kg/hr, IV, TITRATE    
  Last Action:  Rate Verify, 0.4 mcg/kg/hr at 02/06 1953    
  fentaNYL in normal saline (pf) 25 mcg/mL infusion    
  0-1.5 mcg/kg/hr, IV, TITRATE    
  Last Action:  New Bag, 50 mcg/hr at 02/06 1759    
  NOREPINephrine (LEVOPHED) 4 mg in 5% dextrose 250 mL infusion    
  0.5-16 mcg/min, IV, TITRATE    
  Last Action:  Rate Change, 4 mcg/min at 02/07 0902    
  octreotide (SANDOSTATIN) 500 mcg in 0.9% sodium chloride 500 mL infusion    
  50 mcg/hr, IV, CONTINUOUS    
  Last Action:  New Bag, 50 mcg/hr at 02/07 0321 CXR:  
2/10 - worsening bilateral airspace disease (R>L) LAB No results for input(s): GLUCPOC in the last 72 hours. No lab exists for component: [place] Recent Labs  
  02/10/21 
0319 02/09/21 
8940 02/08/21 
0910 02/08/21 
0327 WBC 4.5 6.6  --  9.2 HGB 11.2* 10.6* 11.1* 11.0*  
HCT 33.1* 31.7*  --  32.8* PLT 67* 71*  --  100* INR 1.8 1.8  --   --   
 
Recent Labs  
  02/10/21 
0319 02/09/21 
1246 02/09/21 
0324 02/08/21 
0327   --  139 136*  
K 3.4* 4.2 2.9* 3.4*  
*  --  106 102 CO2 26  --  28 29 GLU 69  --  66 70 BUN 31*  --  30* 41* CREA 0.83  --  0.78* 0.90 MG  --   --  2.2  --   
PHOS  --   --  1.8*  --   
CA 8.3  --  8.2* 8.1* ALB 2.6*  --  2.5*  --   
TBILI 5.3*  --  5.4*  --   
ALT 12  --  13  --   
 
Recent Labs  
  02/10/21 
0537 02/09/21 
0210 02/08/21 
0321 PHI 7.40 7.45 7.45  
PCO2I 38.3 35.6 38.5 PO2I 51* 71* 69*  
 HCO3I 23.9 24.4 26.7* No results for input(s): LCAD, LAC in the last 72 hours. Recent Labs  
  02/10/21 
0537 02/09/21 
0210 02/08/21 
0321 PHI 7.40 7.45 7.45  
PCO2I 38.3 35.6 38.5 PO2I 51* 71* 69* HCO3I 23.9 24.4 26.7* Patient Active Problem List  
Diagnosis Code  Hyponatremia E87.1  Liver cirrhosis ([facility] K74.60  Ascites R18.8  Elevated bilirubin R17  Alcohol dependence (HCC) F10.20  
 Nicotine dependence F17.200  Thrombocytopenia ([facility] D69.6  Diarrhea R19.7  DNR (do not resuscitate) Hoang Santosh  Hepatitis C antibody positive in blood R76.8  Anemia D64.9  
 Hematemesis with nausea K92.0  Hemorrhagic shock (HCC) R57.8  Esophageal bleeding K22.8  Encounter for weaning from ventilator [facility]) Z99.11  Severe protein-calorie malnutrition ([facility] E43 Assessment:  (Medical Decision Making) Hospital Problems  Date Reviewed: 2/6/2021 Codes Class Noted POA Anemia ICD-10-CM: D64.9 ICD-9-CM: 285.9  2/6/2021 Unknown * (Principal) Hematemesis with nausea ICD-10-CM: K92.0 ICD-9-CM: 578.0, 787.02  2/6/2021 Unknown Hemorrhagic shock ([facility] ICD-10-CM: R57.8 ICD-9-CM: 785.59  2/6/2021 Unknown Esophageal bleeding ICD-10-CM: K22.8 ICD-9-CM: 530.82  2/6/2021 Unknown Encounter for weaning from ventilator [facility]) ICD-10-CM: Z99.11 ICD-9-CM: V46.13  2/6/2021 Unknown Severe protein-calorie malnutrition ([facility] ICD-10-CM: B53 ICD-9-CM: 266  2/6/2021 Unknown Hepatitis C antibody positive in blood ICD-10-CM: R76.8 ICD-9-CM: 795.79  12/12/2020 Yes Liver cirrhosis (HCC) ICD-10-CM: K74.60 ICD-9-CM: 571.5  12/10/2020 Yes Ascites ICD-10-CM: R18.8 ICD-9-CM: 789.59  12/10/2020 Yes Plan:  (Medical Decision Making) 60 M with cirrhosis who presented with UGI bleeding s/p scope revealing friable mucosa and ulcerated esophagus with hemorrhaging s/p intervention with GI 
 
 --Patient was extubated 2/9 without issues but had acute worsening oxygenation early this morning 
        -CXR with worsening bilateral infiltrates (R>L) concerning for possible worsening aspiration PNA? 
        -Will add Vanc to Zosyn  
         -Patient may need to be reintubated for airway protection if his mentation/O2 does not improve 
  -Lactulose enemas and limiting sedating meds - prn low dose haldol for agitation  
--monitor Hg s/p esophageal hemorrhage from diffusely ulcerated esophagus -Hb has remained stable, GI following --Thrombocytopenia - 67k this morning - will continue to monitor  
--Ascites - may need LVP in the future --octreotide per GI - stopped 2/9 
--Patient unable to tolerate PO currently and unable to pass NGT due to his ulcerated esophagus. Given worsening pneumonia and mentation, patient will likely require TPN. Will touch base with nutrition PPX: on protonix, SCDs, holding chemical DVT PPX given concerns for esophageal bleeding per GI Full Code Son, Gurdeep Gunter, can be reached at [phone] - updated this morning. We discussed patient's mental status and he reports that he has been confused and altered for roughly 1 month now. I discussed my concerns with him regarding his father's worsening pneumonia, O2 requirement and mental status and how we might have to reintubate him in the near future. He endorsed understanding and all questions/concerns addressed. The patient is critically ill with respiratory failure, circulatory failure and requires high complexity decision making for assessment and support including frequent ventilator adjustment , frequent evaluation and titration of therapies , application of advanced monitoring technologies and extensive interpretation of multiple databases Cumulative time devoted to patient care services by me for day of service -33 min Jose Hodges MD

## 2021-02-10 NOTE — PROGRESS NOTES
Chart reviewed after MD rounds. Pt remains ICU, extubated with re-intubation this am. Fentanyl, levophed gtts. MD has spoke with family. CM following for any assist and d/c POC when stable. LOS 4 days.

## 2021-02-10 NOTE — PROGRESS NOTES
[facility] Progress Note Admit Date:  2/6/2021 Today's Date:  2/10/2021 CC: Coffee ground emesis Subjective:  
 
Patient extubated yesterday. No further bleeding. Still confused and not tolerating PO. Medications:  
Current Facility-Administered Medications Medication Dose Route Frequency  potassium chloride 20 mEq in 100 ml IVPB  20 mEq IntraVENous Q2H  
 haloperidol lactate (HALDOL) injection 2 mg  2 mg IntraVENous Q8H PRN  
 vancomycin (VANCOCIN) 2000 mg in  ml infusion  2,000 mg IntraVENous ONCE  
 vancomycin (VANCOCIN) 1500 mg in  ml infusion  1,500 mg IntraVENous Q12H  
 NUTRITIONAL SUPPORT ELECTROLYTE PRN ORDERS   Does Not Apply PRN  
 lactulose (CHRONULAC) 10 gram/15 mL solution 300 mL  200 g Rectal TID  nicotine (NICODERM CQ) 14 mg/24 hr patch 1 Patch  1 Patch TransDERmal Q24H  
 albuterol-ipratropium (DUO-NEB) 2.5 MG-0.5 MG/3 ML  3 mL Nebulization Q6H PRN  
 0.9% sodium chloride infusion 250 mL  250 mL IntraVENous PRN  
 0.9% sodium chloride infusion 250 mL  250 mL IntraVENous PRN  pantoprazole (PROTONIX) 40 mg in 0.9% sodium chloride 10 mL injection  40 mg IntraVENous Q12H  
 ondansetron (ZOFRAN) injection 4 mg  4 mg IntraVENous Q4H PRN  prochlorperazine (COMPAZINE) with saline injection 10 mg  10 mg IntraVENous Q6H PRN  
 sodium chloride (NS) flush 5-40 mL  5-40 mL IntraVENous PRN  
 albuterol (PROVENTIL VENTOLIN) nebulizer solution 2.5 mg  2.5 mg Inhalation PRN  
 sodium chloride (NS) flush 5-40 mL  5-40 mL IntraVENous Q8H  
 sodium chloride (NS) flush 5-40 mL  5-40 mL IntraVENous PRN  
 acetaminophen (TYLENOL) tablet 650 mg  650 mg Oral Q6H PRN  polyethylene glycol (MIRALAX) packet 17 g  17 g Oral DAILY PRN  promethazine (PHENERGAN) tablet 12.5 mg  12.5 mg Oral Q6H PRN  Or  
 ondansetron (ZOFRAN) injection 4 mg  4 mg IntraVENous Q6H PRN  
  piperacillin-tazobactam (ZOSYN) 3.375 g in 0.9% sodium chloride (MBP/ADV) 100 mL MBP  3.375 g IntraVENous Q8H  
 0.9% sodium chloride infusion 250 mL  250 mL IntraVENous PRN  
 NOREPINephrine (LEVOPHED) 4 mg in 5% dextrose 250 mL infusion  0.5-16 mcg/min IntraVENous TITRATE  
 0.9% sodium chloride infusion 250 mL  250 mL IntraVENous PRN Review of Systems: ROS was obtained, with pertinent positives as listed above. No chest pain or SOB. Diet:  NPO Objective:  
Vitals: 
Visit Vitals BP (!) 89/55 Pulse 66 Temp 97.8 °F (36.6 °C) Resp 29 Ht 6' 7\" (2.007 m) Wt 107 kg (235 lb 14.3 oz) SpO2 93% BMI 26.57 kg/m² Intake/Output: 
No intake/output data recorded. 02/08 1901 - 02/10 0700 In: 3144.4 [I.V.:3144.4] Out: 1920 [Urine:920; Drains:1000] Exam: 
General appearance: confused Lungs: CTAB Heart: BRADYCARDIC Abdomen: soft, non-tender. MODERATE DISTENTION Bowel sounds normal. No masses, no organomegaly Extremities: extremities normal, atraumatic, no cyanosis 1+ BILATERAL; SCD IN PLACE Neuro:  confused Data Review (Labs):   
Recent Labs  
  02/10/21 
0319 02/09/21 
1246 02/09/21 
0324 02/08/21 
0910 02/08/21 
0327 02/07/21 
2235 02/07/21 
1658 02/07/21 
1007 WBC 4.5  --  6.6  --  9.2  --   --   --   
HGB 11.2*  --  10.6* 11.1* 11.0* 11.2* 11.2* 11.5* HCT 33.1*  --  31.7*  --  32.8*  --   --   --   
PLT 67*  --  71*  --  100*  --   --   -- MCV 99.1*  --  97.8  --  97.9*  --   --   --   
  --  139  --  136*  --   --   --   
K 3.4* 4.2 2.9*  --  3.4*  --   --   --   
*  --  106  --  102  --   --   --   
CO2 26  --  28  --  29  --   --   --   
BUN 31*  --  30*  --  41*  --   --   --   
CREA 0.83  --  0.78*  --  0.90  --   --   --   
CA 8.3  --  8.2*  --  8.1*  --   --   --   
MG  --   --  2.2  --   --   --   --   --   
GLU 69  --  66  --  70  --   --   --   
AP 41*  --  41*  --   --   --   --   --   
ALT 12  --  13  --   --   --   --   --   
 TBILI 5.3*  --  5.4*  --   --   --   --   --   
CBIL 3.6*  --  3.4*  --   --   --   --   --   
ALB 2.6*  --  2.5*  --   --   --   --   --   
TP 4.7*  --  4.7*  --   --   --   --   --   
PTP 20.8*  --  21.0*  --   --   --   --   --   
INR 1.8  --  1.8  --   --   --   --   --   
 
 
Assessment:  
 
Principal Problem: 
  Hematemesis with nausea (2/6/2021) Active Problems: 
  Liver cirrhosis ([facility] (12/10/2020) Ascites (12/10/2020) Hepatitis C antibody positive in blood (12/12/2020) Anemia (2/6/2021) Hemorrhagic shock ([facility] (2/6/2021) Esophageal bleeding (2/6/2021) Encounter for weaning from ventilator [facility]) (2/6/2021) Severe protein-calorie malnutrition ([facility] (2/6/2021) 57yo WM with [place] [address] abuse presenting with acute upper GI bleed found to have a diffusely ulcerated esophagus s/p hemospray. He has had no further bleeding and his Hgb is stable at 10.6. Unfortunately, his T bili and PT INR are trending up. Hepatitis discriminant function 34 today. Ammonia 55 on 2/6 with confusion and asterixis. Plan:  
 
-Lactulose enemas for hepatic encephalopathy: once tolerating PO, start Lactulose PO and Xifaxan 550mg BID 
-Do not recommend Dobhoff with his esophagitis. If unable to tolerate po after extubation, may need to be started on TPN. -No plans for repeat EGD right now. Will allow esophagitis further time to heal prior to re-scoping 
-Continue Pantoprazole 40mg IV bid 
-Serial H&H and transfuse as needed 
-Bili up but rest of LFTs improved: likely multifactorial (underlying liver disease, infection, Abx-Zosyn, hypotension, etc). Would hold on Steroids for ETOH hep for now 
-continue IV albumin 
-Eventually, will need Large volume paracentesis when more stable 
-SCDs for now. May consider starting DVT ppx with Lovenox in several days 
-Daily labs Howie Munguia MD

## 2021-02-10 NOTE — ROUTINE PROCESS
Pt with decreased urine output. 125ml over 5-6 hours. MD notified and order for 500ml bolus of NS received and administered.

## 2021-02-10 NOTE — PROGRESS NOTES
Pt agitated and unable to adequately oxygenate. On 100% FiO2 via airvo and O2 saturation in high 80s. CXR obtained and MD suspecting aspiration. Pt becoming more tachycardic and O2 saturation dropping. Planning to emergently intubate. RT aware. Orders for meds received.

## 2021-02-10 NOTE — PROCEDURES
Arterial Line Placement Start time: 2/10/2021 10:05 AM 
End time: 2/10/2021 10:09 AM 
Performed by: Natalya Jane MD 
Authorized by: Natalya Jane MD  
 
Pre-Procedure Indications:  Arterial pressure monitoring and blood sampling Preanesthetic Checklist: patient identified, risks and benefits discussed, anesthesia consent, site marked, patient being monitored, timeout performed and patient being monitored Timeout Time: 10:05 Procedure:  
Prep:  Chlorhexidine Seldinger Technique?: Yes Orientation:  Right Location:  Radial artery Catheter size:  20 G Number of attempts:  1 Assessment:  
Post-procedure:  Line secured and sterile dressing applied Patient Tolerance:  Patient tolerated the procedure well with no immediate complications Comment:  
Potential access sites were examined with ultrasound and the acceptable patent access site was selected with the site noted above. The needle path and access were visualized in real time using ultrasound and an image of the wire in the vessel was recorded for permanent record.

## 2021-02-10 NOTE — CONSULTS
Comprehensive Nutrition Assessment Type and Reason for Visit: Initial, Consult Consult for TPN Management [facility] Pulmonary) Nutrition Recommendations/Plan:  
Parenteral Nutrition: 
Start TPN 
15%DEX/ 5%AA at 42 ml/hr with 250 ml 20% Lipids daily To provide: 1210 kcal/d, 50 grams of protein/d, 150 grams of CHO/d and 1250 ml of total volume/d. Lytes/L:  
Potassium 40 meq (10 meq KCl, 30 meq KPO4), 8 meq Mg Other additives: MTE, MVI Vitamin and Mineral Supplement Therapy: 
Electrolyte management replacement protocol has been activated. 20 mEq IV KCl x 2 today. Labs:  
? BMP daily, Phos and Mg tomorrow then MWF, Triglyceride tomorrow am.   
 
Malnutrition Assessment: 
Malnutrition Status: Severe malnutrition Context: Chronic illness Findings of clinical characteristics of malnutrition:    
Body Fat Loss:  7 - Severe body fat loss, Buccal region, Fat overlying ribs, Orbital, Triceps Muscle Mass Loss:  7 - Severe muscle mass loss, Clavicles (pectoralis &deltoids), Temples (temporalis) Fluid Accumulation:  7 - Severe, Ascites, Extremities  Strength:  Not performed Nutrition Assessment:  
Nutrition History: Family member reports that the patient \"has not been eating for awhile and had N/V x 3 days. \"     
Nutrition Background: Patient is a 60 y.o.  male presents with hematemesis. Patient per notes from ER and chart has history of ETOH abuse and had dark emesis for 3 days. Found down by family and unresponsive with SBP of 80/42 and hg of 8.7. Per chart son told staff stopped ETOH about 2 months after years of a case a day. Also using NSAIDS, TUMS and pepto bismo. Recently noted to have HCV+ and cirrhotic on US. Daily Update: 
Required re-intubation earlier today. Now sedated with Fentanyl and Diprivan. Levophed was started due to hypotension. Abdominal Status (last documented): Distended, Rigid abdomen with Hypoactive  bowel sounds. Last BM unknown. Pertinent Medications: Zosyn, Vancomycin, Lactulose Drips: Fentanyl, Levophed IVF: none Pertinent Labs: potassium 3.4, AM glucose 69, TG 53 
 
Nutrition Related Findings:  
Upper body muscle wasting. Current Nutrition Therapies: DIET NPO Current Intake: Additional Caloric Sources: Diprivan is currently contributing ~150 calories/day. Anthropometric Measures: 
Height: 6' 7\" (200.7 cm)(per EMR) Current Body Wt: 107 kg (235 lb 14.3 oz)(2/10 ICU bed 2/10/21), Weight source: Bed scale BMI: 26.6, Ideal Body Wt: 220 lbs (100 kg), 107.2 % Usual Body Wt: 81.6 kg (180 lb), Percent weight change: 31.1 Estimated Daily Nutrient Needs: 
Energy (kcal/day): 9199-4371 (25-30 octavio/kg/day - vent) (Kcal/kg, Weight Used: Usual) Protein (g/day): 81-97 (1-1.2 gm pro/kg/day) Weight Used: (Usual) CHO limit (g/day):  466 (4 mg/kg/min/day) Fluid (ml/day):   (1 ml/kcal) Nutrition Diagnosis: · Severe malnutrition, In context of chronic illness related to inadequate protein-energy intake as evidenced by severe loss of subcutaneous fat, severe muscle loss, localized or generalized fluid accumulation · Inadequate protein-energy intake related to altered GI function(GIB) as evidenced by (no enteral access per GI & need to start TPN for nutrition) Nutrition Interventions:  
  
  
Coordination of Nutrition Care: Continue to monitor while inpatient Plan of Care discussed with Ian Lacey Goals: Active Goal: Meet >75% of daily nutrition needs within 5 days. Nutrition Monitoring and Evaluation:  
  
Food/Nutrient Intake Outcomes: Parenteral nutrition intake/tolerance Physical Signs/Symptoms Outcomes: Biochemical data, GI status Discharge Planning: Too soon to determine Balbina Lozano RD, LD on 2/10/2021 at 1:11 PM 
Contact: [phone] Disaster Mode active

## 2021-02-10 NOTE — PROGRESS NOTES
02/10/21 6157 Oxygen Therapy O2 Sat (%) 90 % O2 Device Heated; Hi flow nasal cannula O2 Flow Rate (L/min) 60 l/min O2 Temperature 87.8 °F (31 °C) FIO2 (%) 100 %

## 2021-02-11 NOTE — PROGRESS NOTES
Ventilator check complete; patient has a #8. 0 ET tube secured at the 24 at the lip. Patient is sedated. Patient is not able to follow commands. Breath sounds are coarse. Trachea is midline, Negative for subcutaneous air, and chest excursion is symmetric. Patient is also Negative for cyanosis and is Negative for pitting edema. All alarms are set and audible. Resuscitation bag is at the head of the bed. Ventilator Settings Mode FIO2 Rate Tidal Volume Pressure PEEP I:E Ratio VC+  75 %    500 ml  12 cm H2O  8 cm H20  1:2.0 Peak airway pressure: 13 cm H2O Minute ventilation: 11.3 l/min Tarik Cerda, RT

## 2021-02-11 NOTE — PROGRESS NOTES
Comprehensive Nutrition Assessment Type and Reason for Visit: [place] Verde TPN Management [facility] Pulmonary) Nutrition Recommendations/Plan:  
Parenteral Nutrition: · Continue TPN · 15%DEX/ 5%AA at 42 ml/hr (1L) with 250 ml 20% Lipids daily · To provide: 1210 kcal/d, 50 grams of protein/d, 150 grams of CHO/d and 1250 ml of total volume/d. · Advance TPN to goal solution/rate when serum glucose is controlled. · Lytes/L:  
· Potassium 40 meq (10 meq K acetate, 30 meq KPO4), 8 meq Mg 
· Other additives: MTE, 10 units insulin/liter · Vitamin and Mineral Supplement Therapy: · Electrolyte management replacement protocol has been activated. · 20 millimole potassium phosphate. · Labs: · BMP daily, Phos and Mg MWF. Malnutrition Assessment: 
Malnutrition Status: Severe malnutrition Context: Chronic illness Findings of clinical characteristics of malnutrition:     
Body Fat Loss:  7 - Severe body fat loss, Buccal region, Fat overlying ribs, Orbital, Triceps Muscle Mass Loss:  7 - Severe muscle mass loss, Clavicles (pectoralis &deltoids), Temples (temporalis) Fluid Accumulation:  7 - Severe, Ascites, Extremities  Strength:  Not performed Nutrition Assessment:  
Nutrition History: Family member reports that the patient \"has not been eating for awhile and had N/V x 3 days. \"     
Nutrition Background: Patient is a 60 y.o.  male presents with hematemesis. Patient per notes from ER and chart has history of ETOH abuse and had dark emesis for 3 days. Found down by family and unresponsive with SBP of 80/42 and hg of 8.7. Per chart son told staff stopped ETOH about 2 months after years of a case a day. Also using NSAIDS, TUMS and pepto bismo. Recently noted to have HCV+ and cirrhotic on US. Daily Update: 
Remains ventilated and sedated. TPN with lipids was started last evening. Abdominal Status (last documented): Distended, Ascites, Soft abdomen with Hypoactive  bowel sounds. Last BM 02/11/21. Pertinent Medications: Zosyn, Vancomycin, Lactulose Drips: Fentanyl, Levophed, Diprivan IVF: none Pertinent Labs: sodium 142 and increasing (no sodium in TPN), potassium 3.6, AM glucose 220, phosphorus 2.0, TG 98; POC glucose this . Nutrition Related Findings:  
Upper body muscle wasting. +Ascites. + all extremities edema. Current Nutrition Therapies: 
Current Parenteral Nutrition Orders: · Type and Formula: 5%AA/15%DEX · Lipids: 250ml, Daily · Duration: Continuous · Rate/Volume: 42 ml/hr/1L 
· Current PN Order Provides: 1210 calories/day, 50 gm protein/day in 1250 ml/day · Goal PN Orders Provides: 1920 calories/day, 100 gm protein/day in 2250 ml/day Current Intake: Additional Caloric Sources: Diprivan is currently contributing 125-150 calories/day. Anthropometric Measures: 
Height: 6' 7\" (200.7 cm)(per EMR) Current Body Wt: 107.8 kg (237 lb 10.5 oz)(2/11/21), Weight source: Bed scale BMI: 26.8, Ideal Body Wt: 220 lbs (100 kg), 107.2 % Usual Body Wt: 81.6 kg (180 lb), Percent weight change: 31.1 Estimated Daily Nutrient Needs: 
Energy (kcal/day): 6509-4363 (25-30 octavio/kg/day - vent) (Kcal/kg, Weight Used: Usual) Protein (g/day): 81-97 (1-1.2 gm pro/kg/day) Weight Used: (Usual) Fluid (ml/day):   (1 ml/kcal) Nutrition Diagnosis: · Severe malnutrition, In context of chronic illness related to inadequate protein-energy intake as evidenced by severe loss of subcutaneous fat, severe muscle loss, localized or generalized fluid accumulation · Inadequate protein-energy intake related to altered GI function(GIB) as evidenced by (no enteral access per GI & need to start TPN for nutrition) Nutrition Interventions:  
Food and/or Nutrient Delivery: Modify parenteral nutrition Coordination of Nutrition Care: Continue to monitor while inpatient Plan of Care discussed with Og Gan RN. Goals:  
Previous Goal Met: Progressing toward goal(s) Active Goal: Meet >75% of daily nutrition needs within 5 days. Nutrition Monitoring and Evaluation:  
  
Food/Nutrient Intake Outcomes: Parenteral nutrition intake/tolerance Physical Signs/Symptoms Outcomes: Biochemical data, GI status Discharge Planning: Too soon to determine Creasie Lavender. Franny Hardwick RD, LD on 2/11/2021 at 10:06 AM 
Contact: [phone] Disaster Mode active

## 2021-02-11 NOTE — PROGRESS NOTES
Ventilator check complete; patient has a #8. 0 ET tube secured at the 27 
 at the lip. Patient is sedated. Patient is not able to follow commands. Breath sounds are coarse and scattered wheezing. Trachea is midline, Negative for subcutaneous air, and chest excursion is symmetric. Patient is also Negative for cyanosis and is Negative for pitting edema. All alarms are set and audible. Resuscitation bag is at the head of the bed. Ventilator Settings Mode FIO2 Rate Tidal Volume Pressure PEEP I:E Ratio VC+  65 %    500 ml  12 cm H2O  8 cm H20  1:2.0 Peak airway pressure: 28 cm H2O Minute ventilation: 11.4 l/min RT Mckay

## 2021-02-11 NOTE — PROGRESS NOTES
[facility] Progress Note Admit Date:  2/6/2021 Today's Date:  2/11/2021 CC: Coffee ground emesis Subjective:  
 
Patient: Patient re-intubated. Remains on Pressors. No further signs of bleeding. Medications:  
Current Facility-Administered Medications Medication Dose Route Frequency  potassium phosphate 20 mmol in 0.9% sodium chloride 250 mL infusion   IntraVENous ONCE  
 insulin regular (NOVOLIN R, HUMULIN R) injection   SubCUTAneous Q4H  
 albumin human 5% (BUMINATE) solution 25 g  25 g IntraVENous Q12H  
 vanc trough reminder   Other ONCE  
 haloperidol lactate (HALDOL) injection 2 mg  2 mg IntraVENous Q8H PRN  
 vancomycin (VANCOCIN) 1500 mg in  ml infusion  1,500 mg IntraVENous Q12H  propofol (DIPRIVAN) 10 mg/mL infusion  0-50 mcg/kg/min IntraVENous TITRATE  fentaNYL in normal saline (pf) 25 mcg/mL infusion  0-200 mcg/hr IntraVENous TITRATE  TPN ADULT-CENTRAL - dextrose 15% amino acid 5%   IntraVENous QPM  
 fat emulsion 20% (LIPOSYN, INTRAlipid) infusion 250 mL  250 mL IntraVENous QPM  
 NUTRITIONAL SUPPORT ELECTROLYTE PRN ORDERS   Does Not Apply PRN  
 lactulose (CHRONULAC) 10 gram/15 mL solution 300 mL  200 g Rectal TID  nicotine (NICODERM CQ) 14 mg/24 hr patch 1 Patch  1 Patch TransDERmal Q24H  
 albuterol-ipratropium (DUO-NEB) 2.5 MG-0.5 MG/3 ML  3 mL Nebulization Q6H PRN  
 0.9% sodium chloride infusion 250 mL  250 mL IntraVENous PRN  
 0.9% sodium chloride infusion 250 mL  250 mL IntraVENous PRN  pantoprazole (PROTONIX) 40 mg in 0.9% sodium chloride 10 mL injection  40 mg IntraVENous Q12H  
 ondansetron (ZOFRAN) injection 4 mg  4 mg IntraVENous Q4H PRN  prochlorperazine (COMPAZINE) with saline injection 10 mg  10 mg IntraVENous Q6H PRN  
 sodium chloride (NS) flush 5-40 mL  5-40 mL IntraVENous PRN  
 albuterol (PROVENTIL VENTOLIN) nebulizer solution 2.5 mg  2.5 mg Inhalation PRN  
  sodium chloride (NS) flush 5-40 mL  5-40 mL IntraVENous Q8H  
 sodium chloride (NS) flush 5-40 mL  5-40 mL IntraVENous PRN  
 acetaminophen (TYLENOL) tablet 650 mg  650 mg Oral Q6H PRN  polyethylene glycol (MIRALAX) packet 17 g  17 g Oral DAILY PRN  promethazine (PHENERGAN) tablet 12.5 mg  12.5 mg Oral Q6H PRN Or  
 ondansetron (ZOFRAN) injection 4 mg  4 mg IntraVENous Q6H PRN  piperacillin-tazobactam (ZOSYN) 3.375 g in 0.9% sodium chloride (MBP/ADV) 100 mL MBP  3.375 g IntraVENous Q8H  
 0.9% sodium chloride infusion 250 mL  250 mL IntraVENous PRN  
 NOREPINephrine (LEVOPHED) 4 mg in 5% dextrose 250 mL infusion  0.5-16 mcg/min IntraVENous TITRATE  
 0.9% sodium chloride infusion 250 mL  250 mL IntraVENous PRN Review of Systems: ROS was obtained, with pertinent positives as listed above. No chest pain or SOB. Diet:  NPO Objective:  
Vitals: 
Visit Vitals BP (!) 106/57 Pulse 79 Temp 97.7 °F (36.5 °C) Resp 24 Ht 6' 7\" (2.007 m) Comment: per EMR Wt 107.8 kg (237 lb 10.5 oz) SpO2 100% BMI 26.77 kg/m² Intake/Output: 
02/11 0701 - 02/11 1900 In: -  
Out: 60 [Urine:60] 
02/09 1901 - 02/11 0700 In: -  
Out: 3676 [Urine:765; Drains:1000] Exam: 
General appearance: alert, cooperative, no distress ALERT; NURSING AT BEDSIDE; TRACKS WITH EYES, BUT NOT FOLLOWING COMMANDS Lungs: ON VENT Heart: regular rate and rhythm Abdomen: soft, non-tender. MODERATE DISTENTION Bowel sounds normal. No masses, no organomegaly Extremities: extremities normal, atraumatic, no cyanosis or  1+ edema; SCD IN PLACE Neuro:  alert and oriented Data Review (Labs):   
Recent Labs  
  02/11/21 
0355 02/10/21 
0319 02/09/21 
1246 02/09/21 
0324 WBC 9.0 4.5  --  6.6 HGB 11.4* 11.2*  --  10.6* HCT 35.1* 33.1*  --  31.7* PLT 74* 67*  --  71* .4* 99.1*  --  97.8  141  --  139  
K 3.6 3.4* 4.2 2.9*  
* 108*  --  106 CO2 24 26  --  28 BUN 31* 31*  --  30* CREA 1.10 0.83  --  0.78* CA 7.8* 8.3  --  8.2* MG 2.1 2.3  --  2.2 * 69  --  66  
AP 55 41*  --  41* ALT 18 12  --  13  
TBILI 5.3* 5.3*  --  5.4* CBIL 3.5* 3.6*  --  3.4* ALB 2.1* 2.6*  --  2.5* TP 4.3* 4.7*  --  4.7* PTP 23.6* 20.8*  --  21.0* INR 2.1 1.8  --  1.8 Assessment:  
 
Principal Problem: 
  Hematemesis with nausea (2/6/2021) Active Problems: 
  Liver cirrhosis ([address] (12/10/2020) Ascites (12/10/2020) Hepatitis C antibody positive in blood (12/12/2020) Anemia (2/6/2021) Hemorrhagic shock ([address] (2/6/2021) Esophageal bleeding (2/6/2021) Encounter for weaning from ventilator [facility]) (2/6/2021) Severe protein-calorie malnutrition ([address] (2/6/2021) 57yo WM with [address] abuse presenting with acute upper GI bleed found to have a diffusely ulcerated esophagus s/p hemospray. He has had no further bleeding and his Hgb is improving at 11.4. Unfortunately, his T bili has remained elevated and PT INR is trending up 2.1. Hepatitis discriminant function 46 today, but this may be altered due to sepsis. Currently no plans for steroid therapy. Plan:  
 
-Continue Lactulose and resume Xifaxan once able to tolerate po 
-Agree with TPN. Do not recommend NG with esophagitis 
-continue Pantoprazole 40mg IV bid 
-Continue IV albumin 
-No plans for paracentesis right now due to instability with hypotension requiring pressors.  
-There is little to offer from a GI standpoint. Unfortunately, he is not a liver transplant candidate. We will sign off. Please call with any questions. Ronel Black. Varghese Posadas in collaboration with Dr. Juwan Gale [facility]

## 2021-02-11 NOTE — PROGRESS NOTES
Critical Care Daily Progress Note: 2/11/2021 Raj Dela Cruz Admission Date: 2/6/2021 The patient's chart is reviewed and the patient is discussed with the staff. Patient is a 60 y.o.  male presents with hematemesis. Patient per notes from ER and chart has history of ETOH abuse and had dark emesis for 3 days. Found down by family and unresponsive with SBP of 80/42 and hg of 8.7. Per chart son told staff stopped ETOH about 2 months after years of a case a day. Also using NSAIDS, TUMS and pepto bismo. Recently noted to have HCV+ and cirrhotic on US. 
  
Patient seen by GI STAT and felt unstable from likely decompensated cirrhosis and suspected PVT. The did EGD and Anesthesia intubated patient and took to OR.  
  
GI endoscopy noted to have diffusely ulcerated esophagus. No active varices or ulcer noted. Tissue was reported friable and used hemospray instead and coated esophagus.  
  
Get get 2 units of PRBC and ordered FFP and platelets.  
  
Patient now in ICU on Vent and we were asked to take over care now.  
  
 
  
    
Subjective:  
 
Patient had to be reintubated yesterday secondary to worsening oxygenation and mental status. Hb has remained stable but patient had increasing levophed requirement. Patient had some decreased UOP and received some IV fluids. TPN was started. Current Facility-Administered Medications Medication Dose Route Frequency  potassium phosphate 20 mmol in 0.9% sodium chloride 250 mL infusion   IntraVENous ONCE  
 insulin regular (NOVOLIN R, HUMULIN R) injection   SubCUTAneous Q4H  
 haloperidol lactate (HALDOL) injection 2 mg  2 mg IntraVENous Q8H PRN  
 vancomycin (VANCOCIN) 1500 mg in  ml infusion  1,500 mg IntraVENous Q12H  propofol (DIPRIVAN) 10 mg/mL infusion  0-50 mcg/kg/min IntraVENous TITRATE  fentaNYL in normal saline (pf) 25 mcg/mL infusion  0-200 mcg/hr IntraVENous TITRATE  TPN ADULT-CENTRAL - dextrose 15% amino acid 5%   IntraVENous QPM  
 fat emulsion 20% (LIPOSYN, INTRAlipid) infusion 250 mL  250 mL IntraVENous QPM  
 NUTRITIONAL SUPPORT ELECTROLYTE PRN ORDERS   Does Not Apply PRN  
 lactulose (CHRONULAC) 10 gram/15 mL solution 300 mL  200 g Rectal TID  nicotine (NICODERM CQ) 14 mg/24 hr patch 1 Patch  1 Patch TransDERmal Q24H  
 albuterol-ipratropium (DUO-NEB) 2.5 MG-0.5 MG/3 ML  3 mL Nebulization Q6H PRN  
 0.9% sodium chloride infusion 250 mL  250 mL IntraVENous PRN  
 0.9% sodium chloride infusion 250 mL  250 mL IntraVENous PRN  pantoprazole (PROTONIX) 40 mg in 0.9% sodium chloride 10 mL injection  40 mg IntraVENous Q12H  
 ondansetron (ZOFRAN) injection 4 mg  4 mg IntraVENous Q4H PRN  prochlorperazine (COMPAZINE) with saline injection 10 mg  10 mg IntraVENous Q6H PRN  
 sodium chloride (NS) flush 5-40 mL  5-40 mL IntraVENous PRN  
 albuterol (PROVENTIL VENTOLIN) nebulizer solution 2.5 mg  2.5 mg Inhalation PRN  
 sodium chloride (NS) flush 5-40 mL  5-40 mL IntraVENous Q8H  
 sodium chloride (NS) flush 5-40 mL  5-40 mL IntraVENous PRN  
 acetaminophen (TYLENOL) tablet 650 mg  650 mg Oral Q6H PRN  polyethylene glycol (MIRALAX) packet 17 g  17 g Oral DAILY PRN  promethazine (PHENERGAN) tablet 12.5 mg  12.5 mg Oral Q6H PRN Or  
 ondansetron (ZOFRAN) injection 4 mg  4 mg IntraVENous Q6H PRN  piperacillin-tazobactam (ZOSYN) 3.375 g in 0.9% sodium chloride (MBP/ADV) 100 mL MBP  3.375 g IntraVENous Q8H  
 0.9% sodium chloride infusion 250 mL  250 mL IntraVENous PRN  
 NOREPINephrine (LEVOPHED) 4 mg in 5% dextrose 250 mL infusion  0.5-16 mcg/min IntraVENous TITRATE  
 0.9% sodium chloride infusion 250 mL  250 mL IntraVENous PRN Review of Systems Unobtainable due to patient status. Objective:  
 
Vitals:  
 02/11/21 0700 02/11/21 0715 02/11/21 0730 02/11/21 0745 BP:      
Pulse: 79 77 77 77 Resp: 25 21 20 22 Temp:    36.5 °C (97.7 °F) SpO2: 98% 100% 99% 100% Weight:      
Height:      
 
 
Intake/Output Summary (Last 24 hours) at 2/11/2021 6188 Last data filed at 2/11/2021 9895 Gross per 24 hour Intake  Output 485 ml Net -485 ml Physical Exam:         
Constitutional: frail appearing, intubated EENMT:  Mildly jaundiced, pupils equal, oral mucosa moist 
Respiratory: ETT, vented, coarse breath sounds bilaterally Cardiovascular:  RRR, no M/R/G Gastrointestinal: ascites, distended Musculoskeletal: warm without cyanosis. No significant LE edema Neurologic: sedated Psychiatric:  sedated LINES: 
Quad Lumen quad lumen 02/06/21 Left Internal jugular Urinary Catheter 02/06/21 2- way Airway - Endotracheal Tube 02/6 - extubated 2/9 - reintubated 2/10 DRIPS: 
Propofol Fentanyl Levophed CXR:  
2/10 - worsening bilateral airspace disease (R>L) LAB No results for input(s): GLUCPOC in the last 72 hours. No lab exists for component: [place] Recent Labs  
  02/11/21 
0355 02/10/21 
0319 02/09/21 
3546 WBC 9.0 4.5 6.6 HGB 11.4* 11.2* 10.6* HCT 35.1* 33.1* 31.7* PLT 74* 67* 71* INR 2.1 1.8 1.8 Recent Labs  
  02/11/21 
0355 02/10/21 
0319 02/09/21 
1246 02/09/21 
0324  141  --  139  
K 3.6 3.4* 4.2 2.9*  
* 108*  --  106 CO2 24 26  --  28  
* 69  --  66 BUN 31* 31*  --  30* CREA 1.10 0.83  --  0.78* MG 2.1 2.3  --  2.2 PHOS 2.0* 2.5  --  1.8*  
CA 7.8* 8.3  --  8.2* ALB 2.1* 2.6*  --  2.5* TBILI 5.3* 5.3*  --  5.4* ALT 18 12  --  13 Recent Labs  
  02/11/21 
0221 02/10/21 
1114 02/10/21 
0537 PHI 7.34* 7.37 7.40 PCO2I 42.4 38.9 38.3 PO2I 94 71* 51* HCO3I 23.0 22.2 23.9 No results for input(s): LCAD, LAC in the last 72 hours. Recent Labs  
  02/11/21 
0221 02/10/21 
1114 02/10/21 
0537 PHI 7.34* 7.37 7.40 PCO2I 42.4 38.9 38.3 PO2I 94 71* 51* HCO3I 23.0 22.2 23.9 Patient Active Problem List  
Diagnosis Code  Hyponatremia E87.1  Liver cirrhosis ([facility] K74.60  Ascites R18.8  Elevated bilirubin R17  Alcohol dependence (HCC) F10.20  
 Nicotine dependence F17.200  Thrombocytopenia ([facility] D69.6  Diarrhea R19.7  DNR (do not resuscitate) [phone]  Hepatitis C antibody positive in blood R76.8  Anemia D64.9  
 Hematemesis with nausea K92.0  Hemorrhagic shock (HCC) R57.8  Esophageal bleeding K22.8  Encounter for weaning from ventilator [facility]) Z99.11  Severe protein-calorie malnutrition ([facility] E43 Assessment:  (Medical Decision Making) Hospital Problems  Date Reviewed: 2/6/2021 Codes Class Noted POA Anemia ICD-10-CM: D64.9 ICD-9-CM: 285.9  2/6/2021 Unknown * (Principal) Hematemesis with nausea ICD-10-CM: K92.0 ICD-9-CM: 578.0, 787.02  2/6/2021 Unknown Hemorrhagic shock ([facility] ICD-10-CM: R57.8 ICD-9-CM: 785.59  2/6/2021 Unknown Esophageal bleeding ICD-10-CM: K22.8 ICD-9-CM: 530.82  2/6/2021 Unknown Encounter for weaning from ventilator [facility]) ICD-10-CM: Z99.11 ICD-9-CM: V46.13  2/6/2021 Unknown Severe protein-calorie malnutrition ([facility] ICD-10-CM: W85 ICD-9-CM: 461  2/6/2021 Unknown Hepatitis C antibody positive in blood ICD-10-CM: R76.8 ICD-9-CM: 795.79  12/12/2020 Yes Liver cirrhosis (HCC) ICD-10-CM: K74.60 ICD-9-CM: 571.5  12/10/2020 Yes Ascites ICD-10-CM: R18.8 ICD-9-CM: 789.59  12/10/2020 Yes Plan:  (Medical Decision Making) 60 M with cirrhosis who presented with UGI bleeding s/p scope revealing friable mucosa and ulcerated esophagus with hemorrhaging s/p intervention with GI 
 
-Acute hypoxic resp failure - extubated 2/9 but had to be reintubated 2/10 due to likely aspiration 
        -CXR with worsening bilateral infiltrates (R>L) -Vanc (started 2/10), Zosyn (started 2/6 PM) 
        -Propofol, fentanyl for sedation - will try to limit as able --monitor Hb s/p esophageal hemorrhage from diffusely ulcerated esophagus -Hb has remained stable, GI following  
--Chronic Thrombocytopenia - stable this morning - will continue to monitor  
--Ascites - may need LVP in the future  
 -Lactulose enemas 
 -Daily LFTs, INR 
--Unable to place NGT due to his ulcerated esophagus and bleeding risk - TPN started 
--SSI PPX: on protonix, SCDs, holding chemical DVT PPX given concerns for esophageal bleeding per GI Full Code Son, Karen Lopez, can be reached at [phone] - updated this morning The patient is critically ill with respiratory failure, circulatory failure and requires high complexity decision making for assessment and support including frequent ventilator adjustment , frequent evaluation and titration of therapies , application of advanced monitoring technologies and extensive interpretation of multiple databases Cumulative time devoted to patient care services by me for day of service - 33 min Kristina Khan MD

## 2021-02-12 NOTE — PROGRESS NOTES
Ventilator check complete; patient has a #8. 0 ET tube secured at the 26 at the lip. Breath sounds are diminished. Trachea is midline, Negative for subcutaneous air, and chest excursion is symmetric. Patient is also Negative for cyanosis and is Negative for pitting edema. All alarms are set and audible. Resuscitation bag is at the head of the bed. Ventilator Settings Mode FIO2 Rate Tidal Volume Pressure PEEP I:E Ratio VC+  55 %    0.5 ml  12 cm H2O  8 cm H20  1:1.5 Peak airway pressure: 31 cm H2O Minute ventilation: 14.5 l/min ABG: No results for input(s): PH, PCO2, PO2, HCO3 in the last 72 hours.  
 
 
Castillo Herr, RT

## 2021-02-12 NOTE — PROGRESS NOTES
Ventilator check complete; patient has a #8. 0 ET tube secured at the 27 at the lip. Patient is sedated. Patient is not able to follow commands. Breath sounds are coarse. Trachea is midline, Negative for subcutaneous air, and chest excursion is symmetric. Patient is also Negative for cyanosis and is Negative for pitting edema. All alarms are set and audible. Resuscitation bag is at the head of the bed. Ventilator Settings Mode FIO2 Rate Tidal Volume Pressure PEEP I:E Ratio VC+  65 %    500 ml  12 cm H2O  8 cm H20  1:2.0 Peak airway pressure: 26 cm H2O Minute ventilation: 14.7 l/min Aparna Lights, RT

## 2021-02-12 NOTE — PROGRESS NOTES
Chart reviewed as pt remains ICU post MD round. Intubated/vent -55% Fio2. Fentanyl, levo, and propofol gtts. Was tx to ICU for UGI bleeding s/p scope revealing friable mucosa and ulcerated esophagus with hemorrhaging s/p intervention with GI.  No payor source and [employer] is following for financial assist. CM will follow for any assist and d/c needs/POC when stable per MD. LOS 6 days.

## 2021-02-12 NOTE — PROGRESS NOTES
TPN order not in for today.  Contacted pharmacist, waiting for direction on how to proceed from pharmacist.  
If they do not have a plan will contact intensivist

## 2021-02-12 NOTE — PROCEDURES
Electroencephalogram 
 
This EEG is performed on a multichannel digital EEG device utilizing dermal electrodes. The background shows a alternating suppression pattern with electrocortical activity occurring in brief runs of 1 to 2 seconds followed by periods of relative electrocerebral inactivity. No specific spike slow wave discharges are noted in the recording is overall symmetric. No metabolic type abnormalities Impression Grossly abnormal EEG with a periodic pattern as noted above which is consistent with most likely an underlying hypoperfusional injury there are her minimal features to suggest hepatic etiology. Overall the recording would be considered to carry a poor prognosis

## 2021-02-12 NOTE — PROGRESS NOTES
Addendum: 
The TPN described below was inadvertently not ordered. D10W infused from ~1800 on 2/12 until 1759 on 2/13 when TPN was available. Dorie Haider. Nader Maldonado RD, LD on 2/13/2021 at 1:38 PM 
Contact: [phone] Comprehensive Nutrition Assessment Type and Reason for Visit: [place] TPN Management [facility] Pulmonary) Nutrition Recommendations/Plan:  
Parenteral Nutrition: 
Advance TPN  
15%DEX/ 5%AA 1.8 L with 250 ml 20% Lipids daily To provide: 1778 kcal/d (88% of needs), 90 grams of protein/d (100% of needs), 270 grams of CHO/d and 0821-4832 ml of total volume/d (100% of needs). Diprivan contributes an additional 169 calories/day and Levophed contributes 170 calories/day - total intake: 2117 calories/day (100% calorie goal). Lytes/L:  
Potassium 50 meq (20 meq K acetate, 30 meq KPO4), 8 meq Mg Other additives: MTE, MVI, 8 units insulin/L Vitamin and Mineral Supplement Therapy: 
Electrolyte management replacement protocol active. Receiving 40 mEq IV KCl and 20 millimole IV KPO4 today. Labs:  
? BMP daily, Phos and Mg MWF. Continue POC Glucoses/SSI Malnutrition Assessment: 
Malnutrition Status: Severe malnutrition Context: Chronic illness Findings of clinical characteristics of malnutrition:  
Body Fat Loss:  7 - Severe body fat loss, Buccal region, Fat overlying ribs, Orbital, Triceps Muscle Mass Loss:  7 - Severe muscle mass loss, Clavicles (pectoralis &deltoids), Temples (temporalis) Fluid Accumulation:  7 - Severe, Ascites, Extremities  Strength:  Not performed Nutrition Assessment:  
Nutrition History: Family member reports that the patient \"has not been eating for awhile and had N/V x 3 days. \"     
 Nutrition Background: Patient is a 60 y.o.  male presents with hematemesis. Patient per notes from ER and chart has history of ETOH abuse and had dark emesis for 3 days. Found down by family and unresponsive with SBP of 80/42 and hg of 8.7. Per chart son told staff stopped ETOH about 2 months after years of a case a day. Also using NSAIDS, TUMS and pepto bismo. Recently noted to have HCV+ and cirrhotic on US. Daily Update: 
Remains ventilated, sedated and TPN-dependent. Diprivan continues at a stable rate. Levophed is now contributing >150 calories/day. Pertinent Medications: Vancomycin, Lactulose Drips: Fentanyl, Levophed, Diprivan IVF: none Pertinent Labs: sodium 142, potassium 3.5, AM glucose 139, phosphorus 2.2; POC glucose (2/11) 007,813,811 and (2/12) 125. Nutrition Related Findings:  
Upper body muscle wasting. +Ascites. + all extremities edema. Current Nutrition Therapies: 
Current Parenteral Nutrition Orders: · Type and Formula: 5%AA/15%DEX · Lipids: 250ml, Daily · Duration: Continuous · Rate/Volume: 42 ml/hr/ 1L 
· Current PN Order Provides: 7061 calories/day,50 gm protein/day in ~1250 ml/day · Goal PN Orders Provides: 1390 calories/day, 90 gm protein/day in 1623-1562 ml volume/day Current Intake: Additional Caloric Sources: Diprivan is currently contributing 150-175 calories/day Anthropometric Measures: 
Height: 6' 7\" (200.7 cm)(per EMR) Current Body Wt: 112.1 kg (247 lb 2.2 oz)(2/12/21), Weight source: Bed scale BMI: 27.8, Ideal Body Weight (lbs) (Calculated): 220 lbs (100 kg), 107.2 % Usual Body Wt: 81.6 kg (180 lb), Percent weight change: 31.1 Estimated Daily Nutrient Needs: 
Energy (kcal/day): 7161-4227 (25-30 octavio/kg/day - vent) (Kcal/kg, Weight Used: Usual) Protein (g/day): 81-97 (1-1.2 gm pro/kg/day) Weight Used: (Usual) Fluid (ml/day):   (1 ml/kcal) Nutrition Diagnosis: · Severe malnutrition, In context of chronic illness related to inadequate protein-energy intake as evidenced by severe loss of subcutaneous fat, severe muscle loss, localized or generalized fluid accumulation · Inadequate protein-energy intake related to altered GI function(GIB) as evidenced by nutrition support-parenteral nutrition(no enteral access allowed per GI ) Nutrition Interventions:  
Food and/or Nutrient Delivery: Modify parenteral nutrition Coordination of Nutrition Care: Continue to monitor while inpatient Plan of Care discussed with Peggy Orellana RN. Goals:  
Previous Goal Met: Progressing toward goal(s) Active Goal: Meet >75% of daily nutrition needs within 5 days. Nutrition Monitoring and Evaluation:  
  
Food/Nutrient Intake Outcomes: Parenteral nutrition intake/tolerance Physical Signs/Symptoms Outcomes: Biochemical data, GI status Discharge Planning: Too soon to determine Gladystine Fruit. Dana Danielson RD, LD on 2/12/2021 at 12:29 PM 
Contact: [phone] Disaster Mode active

## 2021-02-12 NOTE — PROGRESS NOTES
Pharmacokinetic Consult to Pharmacist 
 
Raj Lanie is a 60 y.o. male being treated with vancomycin. Height: 6' 7\" (200.7 cm)(per EMR)  Weight: 112.1 kg (247 lb 2.2 oz) Lab Results Component Value Date/Time BUN 27 (H) 02/12/2021 03:51 AM  
 Creatinine 0.88 02/12/2021 03:51 AM  
 WBC 8.1 02/12/2021 03:51 AM  
  
Estimated Creatinine Clearance: 112.4 mL/min (based on SCr of 0.88 mg/dL). CULTURES: 
Pending Lab Results Component Value Date/Time Vancomycin,trough 22.7 (HH) 02/11/2021 07:22 PM  
 
 
Day 3 of vancomycin. Goal trough is 15-20. Vancomycin trough obtained last evening at 22.7. Level was drawn slightly early. Will decrease the dose to 1000 mg every 12 hours for now. Will continue to follow patient and order levels when clinically indicated. Thank you, Deja Ivy, PharmD, [facility] Clinical [facility] Special[facility] 
[phone]

## 2021-02-12 NOTE — PROGRESS NOTES
Critical Care Daily Progress Note: 2/12/2021 Tran Garcia Admission Date: 2/6/2021 The patient's chart is reviewed and the patient is discussed with the staff. Patient is a 60 y.o.  male presents with hematemesis. Patient per notes from ER and chart has history of ETOH abuse and had dark emesis for 3 days. Found down by family and unresponsive with SBP of 80/42 and hg of 8.7. Per chart son told staff stopped ETOH about 2 months after years of a case a day. Also using NSAIDS, TUMS and pepto bismo. Recently noted to have HCV+ and cirrhotic on US. 
  
Patient seen by GI STAT and felt unstable from likely decompensated cirrhosis and suspected PVT. The did EGD and Anesthesia intubated patient and took to OR.  
  
GI endoscopy noted to have diffusely ulcerated esophagus. No active varices or ulcer noted. Tissue was reported friable and used hemospray instead and coated esophagus.  
  
Get get 2 units of PRBC and ordered FFP and platelets.  
  
Patient now in ICU on Vent and we were asked to take over care now.  
  
 
  
    
Subjective: No major events overnight. Remains on vent and levophed. Hb has remained stable but platelets are trending down and INR trending up. No overt signs of bleeding. Current Facility-Administered Medications Medication Dose Route Frequency  potassium phosphate 20 mmol in 0.9% sodium chloride 250 mL infusion   IntraVENous ONCE  potassium chloride 20 mEq in 100 ml IVPB  20 mEq IntraVENous Q2H  
 vancomycin (VANCOCIN) 1,000 mg in 0.9% sodium chloride 250 mL (VIAL-MATE)  1,000 mg IntraVENous Q12H  
 albumin human 5% (BUMINATE) solution 25 g  25 g IntraVENous Q12H  TPN ADULT-CENTRAL - dextrose 15% amino acid 5%   IntraVENous QPM  
 insulin regular (NOVOLIN R, HUMULIN R) injection   SubCUTAneous Q6H  
 carboxymethylcellulose sodium (REFRESH LIQUIGEL) 1 % ophthalmic solution 1 Drop  1 Drop Both Eyes PRN  
  ofloxacin (Ocuflox) 0.3 % ophthalmic solution (Patient's Own Med) (Patient Supplied)  1 Drop Left Eye QID  haloperidol lactate (HALDOL) injection 2 mg  2 mg IntraVENous Q8H PRN  propofol (DIPRIVAN) 10 mg/mL infusion  0-50 mcg/kg/min IntraVENous TITRATE  fentaNYL in normal saline (pf) 25 mcg/mL infusion  0-200 mcg/hr IntraVENous TITRATE  fat emulsion 20% (LIPOSYN, INTRAlipid) infusion 250 mL  250 mL IntraVENous QPM  
 NUTRITIONAL SUPPORT ELECTROLYTE PRN ORDERS   Does Not Apply PRN  
 lactulose (CHRONULAC) 10 gram/15 mL solution 300 mL  200 g Rectal TID  nicotine (NICODERM CQ) 14 mg/24 hr patch 1 Patch  1 Patch TransDERmal Q24H  
 albuterol-ipratropium (DUO-NEB) 2.5 MG-0.5 MG/3 ML  3 mL Nebulization Q6H PRN  
 0.9% sodium chloride infusion 250 mL  250 mL IntraVENous PRN  
 0.9% sodium chloride infusion 250 mL  250 mL IntraVENous PRN  pantoprazole (PROTONIX) 40 mg in 0.9% sodium chloride 10 mL injection  40 mg IntraVENous Q12H  
 ondansetron (ZOFRAN) injection 4 mg  4 mg IntraVENous Q4H PRN  prochlorperazine (COMPAZINE) with saline injection 10 mg  10 mg IntraVENous Q6H PRN  
 sodium chloride (NS) flush 5-40 mL  5-40 mL IntraVENous PRN  
 albuterol (PROVENTIL VENTOLIN) nebulizer solution 2.5 mg  2.5 mg Inhalation PRN  
 sodium chloride (NS) flush 5-40 mL  5-40 mL IntraVENous Q8H  
 sodium chloride (NS) flush 5-40 mL  5-40 mL IntraVENous PRN  
 acetaminophen (TYLENOL) tablet 650 mg  650 mg Oral Q6H PRN  polyethylene glycol (MIRALAX) packet 17 g  17 g Oral DAILY PRN  promethazine (PHENERGAN) tablet 12.5 mg  12.5 mg Oral Q6H PRN Or  
 ondansetron (ZOFRAN) injection 4 mg  4 mg IntraVENous Q6H PRN  
 0.9% sodium chloride infusion 250 mL  250 mL IntraVENous PRN  
 NOREPINephrine (LEVOPHED) 4 mg in 5% dextrose 250 mL infusion  0.5-16 mcg/min IntraVENous TITRATE  
 0.9% sodium chloride infusion 250 mL  250 mL IntraVENous PRN Review of Systems Unobtainable due to patient status. Objective:  
 
Vitals:  
 02/12/21 0645 02/12/21 0700 02/12/21 0715 02/12/21 0730 BP:      
Pulse: 87 87 87 85 Resp: 23 28 23 22 Temp:      
SpO2: 100% 100% 100% 100% Weight:      
Height:      
 
 
Intake/Output Summary (Last 24 hours) at 2/12/2021 0820 Last data filed at 2/12/2021 2088 Gross per 24 hour Intake 5001 ml Output 1510 ml Net 3491 ml Physical Exam:         
Constitutional: frail appearing, intubated EENMT:  Mildly jaundiced, pupils equal, oral mucosa moist 
Respiratory: ETT, vented, coarse breath sounds bilaterally,  
Cardiovascular:  RRR, no M/R/G Gastrointestinal: ascites, distended Musculoskeletal: warm without cyanosis. No significant LE edema Neurologic: sedated Psychiatric:  sedated LINES: 
Quad Lumen quad lumen 02/06/21 Left Internal jugular Urinary Catheter 02/06/21 2- way Airway - Endotracheal Tube 02/6 - extubated 2/9 - reintubated 2/10 DRIPS: 
Propofol Fentanyl Levophed CXR:  
2/10 - worsening bilateral airspace disease (R>L) LAB Recent Labs  
  02/11/21 
2121 02/11/21 
1453 02/11/21 
3668 GLUCPOC 143* 163* 214* Recent Labs  
  02/12/21 
0351 02/11/21 
0355 02/10/21 
0319 WBC 8.1 9.0 4.5 HGB 10.4* 11.4* 11.2* HCT 32.7* 35.1* 33.1*  
PLT 43* 74* 67* INR 2.2 2.1 1.8 Recent Labs  
  02/12/21 
0351 02/11/21 
0355 02/10/21 
0319  142 141  
K 3.5 3.6 3.4*  
* 111* 108* CO2 24 24 26 * 220* 69 BUN 27* 31* 31* CREA 0.88 1.10 0.83  
MG 2.1 2.1 2.3 PHOS 2.2* 2.0* 2.5  
CA 7.8* 7.8* 8.3 ALB 2.5* 2.1* 2.6* TBILI 5.3* 5.3* 5.3* ALT 19 18 12 Recent Labs  
  02/12/21 
0156 02/11/21 
0221 02/10/21 
1114 PHI 7.28* 7.34* 7.37  
PCO2I 51.6* 42.4 38.9 PO2I 82 94 71* HCO3I 24.1 23.0 22.2 No results for input(s): LCAD, LAC in the last 72 hours. Recent Labs  
  02/12/21 
0156 02/11/21 
0221 02/10/21 
1114 PHI 7.28* 7.34* 7.37  
PCO2I 51.6* 42.4 38.9 PO2I 82 94 71* HCO3I 24.1 23.0 22.2 Patient Active Problem List  
Diagnosis Code  Hyponatremia E87.1  [facility] K74.60  Ascites R18.8  Elevated bilirubin R17  Alcohol dependence (HCC) F10.20  
 Nicotine dependence F17.200  [facility] D69.6  Diarrhea R19.7  DNR (do not resuscitate) [phone]  Hepatitis C antibody positive in blood R76.8  Anemia D64.9  
 Hematemesis with nausea K92.0  Hemorrhagic shock (HCC) R57.8  Esophageal bleeding K22.8  Encounter for weaning from ventilator [facility]) Z99.11  Severe protein-calorie malnutrition ([facility] E43 Assessment:  (Medical Decision Making) Hospital Problems  Date Reviewed: 2/6/2021 Codes Class Noted POA Anemia ICD-10-CM: D64.9 ICD-9-CM: 285.9  2/6/2021 Unknown * (Principal) Hematemesis with nausea ICD-10-CM: K92.0 ICD-9-CM: 578.0, 787.02  2/6/2021 Unknown Hemorrhagic shock ([facility] ICD-10-CM: R57.8 ICD-9-CM: 785.59  2/6/2021 Unknown Esophageal bleeding ICD-10-CM: K22.8 ICD-9-CM: 530.82  2/6/2021 Unknown Encounter for weaning from ventilator [facility]) ICD-10-CM: Z99.11 ICD-9-CM: V46.13  2/6/2021 Unknown Severe protein-calorie malnutrition ([facility] ICD-10-CM: A07 ICD-9-CM: 802  2/6/2021 Unknown Hepatitis C antibody positive in blood ICD-10-CM: R76.8 ICD-9-CM: 795.79  12/12/2020 Yes Liver cirrhosis (HCC) ICD-10-CM: K74.60 ICD-9-CM: 571.5  12/10/2020 Yes Ascites ICD-10-CM: R18.8 ICD-9-CM: 789.59  12/10/2020 Yes Plan:  (Medical Decision Making) 60 M with cirrhosis who presented with UGI bleeding s/p scope revealing friable mucosa and ulcerated esophagus with hemorrhaging s/p intervention with GI 
 
-Acute hypoxic resp failure - extubated 2/9 but had to be reintubated 2/10 due to likely aspiration 
        -CXR with worsening bilateral infiltrates (R>L) - 
        -Vanc (started 2/10), Zosyn (started 2/6 PM) -Propofol, fentanyl for sedation - will try to limit as able and c/w lactulose  
--monitor Hb s/p esophageal hemorrhage from diffusely ulcerated esophagus -Hb has remained stable 
--Chronic Thrombocytopenia - slight drop in Plts today 
 -Also had increase in INR 
 -No signs of bleeding currently - will continue to trend and consider Vit K/products if needed 
--Ascites - may need LVP in the near future - on levophed currently but may also be limiting venous return and ventilation 
 -Lactulose enemas 
 -Daily LFTs, INR 
--Unable to place NGT due to his ulcerated esophagus and bleeding risk - TPN going  
--SSI PPX: on protonix, SCDs, holding chemical DVT PPX given concerns for esophageal bleeding and thrombocytopenia and elevated INR Prognosis seems guarded Full Code Son, Steffen Alvarez, can be reached at [phone] - attempted to call this morning but he did not answer Addendum - patient called the hospital and I was able to update the son The patient is critically ill with respiratory failure, circulatory failure and requires high complexity decision making for assessment and support including frequent ventilator adjustment , frequent evaluation and titration of therapies , application of advanced monitoring technologies and extensive interpretation of multiple databases Cumulative time devoted to patient care services by me for day of service - 32 min Jeniffer Felipe MD

## 2021-02-12 NOTE — PROGRESS NOTES
I was notified by nursing staff of possible seizure-like activity so I went to bedside to assess. Patient appeared to be having rhythmic jerking of upper and lower extremities as well as some foaming in the oropharynx previously. Patient was given IV ativan for possible seizure and will consult neurology.  
 
Lola Irby MD

## 2021-02-12 NOTE — CONSULTS
[facility] [place], [address] [facility] Chief Complaint Patient presents with  Vomiting Meche Rodriguez is a 60 y.o. male who presents on referral from the intensivists. Patient had an episode today of clonic activity in the lower rather than upper extremities. Patient has been critically ill admitted to the hospital last week with GI bleed shock ulcerated esophagus profound hepatic cirrhosis with ascites Past Medical History:  
Diagnosis Date  Cirrhosis ([address] Past Surgical History:  
Procedure Laterality Date  IR PARACENTESIS ABD W IMAGE  12/11/2020 No family history on file. Social History Socioeconomic History  Marital status:  Spouse name: Not on file  Number of children: Not on file  Years of education: Not on file  Highest education level: Not on file Current Facility-Administered Medications:  
  potassium phosphate 20 mmol in 0.9% sodium chloride 250 mL infusion, , IntraVENous, ONCE, Alexander Olguin MD, Given at 02/12/21 1034 
  vancomycin (VANCOCIN) 1,000 mg in 0.9% sodium chloride 250 mL (VIAL-MATE), 1,000 mg, IntraVENous, Q12H, Mago Dailey MD, 1,000 mg at 02/12/21 5523   thiamine (B-1) 100 mg in 0.9% sodium chloride 50 mL IVPB, 100 mg, IntraVENous, Q12H, Adenike Burroughs MD 
  albumin human 5% (BUMINATE) solution 25 g, 25 g, IntraVENous, Q12H, Preston Jackson MD, Last Rate: 250 mL/hr at 02/11/21 1021, 25 g at 02/12/21 0847   TPN ADULT-CENTRAL - dextrose 15% amino acid 5%, , IntraVENous, QPM, Preston Jackson MD, Last Rate: 42 mL/hr at 02/11/21 1740, New Bag at 02/11/21 1740 
  insulin regular (NOVOLIN R, HUMULIN R) injection, , SubCUTAneous, Q6H, Preston Jackson MD, Stopped at 02/11/21 2100   carboxymethylcellulose sodium (REFRESH LIQUIGEL) 1 % ophthalmic solution 1 Drop, 1 Drop, Both Eyes, PRN, Mago Dailey MD, 1 Drop at 02/12/21 1228   ofloxacin (Ocuflox) 0.3 % ophthalmic solution (Patient's Own Med) (Patient Supplied), 1 Drop, Left Eye, QID, Ashlie, Matthew Gracia MD, 1 Drop at 02/12/21 1232 
  haloperidol lactate (HALDOL) injection 2 mg, 2 mg, IntraVENous, Q8H PRN, Wade Houston MD, 2 mg at 02/10/21 5174   propofol (DIPRIVAN) 10 mg/mL infusion, 0-50 mcg/kg/min, IntraVENous, TITRATE, Wade Houston MD, Last Rate: 12.8 mL/hr at 02/12/21 1229, 20 mcg/kg/min at 02/12/21 1229 
  fentaNYL in normal saline (pf) 25 mcg/mL infusion, 0-200 mcg/hr, IntraVENous, TITRATE, Wade Houston MD, Last Rate: 1 mL/hr at 02/12/21 1228, 25 mcg/hr at 02/12/21 1228   fat emulsion 20% (LIPOSYN, INTRAlipid) infusion 250 mL, 250 mL, IntraVENous, QPM, Wade Houston MD, 250 mL at 02/11/21 1741 
  NUTRITIONAL SUPPORT ELECTROLYTE PRN ORDERS, , Does Not Apply, PRN, Merissa Guevara MD 
  lactulose (CHRONULAC) 10 gram/15 mL solution 300 mL, 200 g, Rectal, TID, Eduard Schneider NP, 300 mL at 02/12/21 0900 
  nicotine (NICODERM CQ) 14 mg/24 hr patch 1 Patch, 1 Patch, TransDERmal, Q24H, Wade Houston MD, 1 Patch at 02/11/21 1616   albuterol-ipratropium (DUO-NEB) 2.5 MG-0.5 MG/3 ML, 3 mL, Nebulization, Q6H PRN, Wade Houston MD 
  0.9% sodium chloride infusion 250 mL, 250 mL, IntraVENous, PRN, Marcelo Dang MD 
  0.9% sodium chloride infusion 250 mL, 250 mL, IntraVENous, PRN, Nicolas Dunlap NP 
  pantoprazole (PROTONIX) 40 mg in 0.9% sodium chloride 10 mL injection, 40 mg, IntraVENous, Q12H, Nicolas Dunlap NP, 40 mg at 02/12/21 2313   ondansetron (ZOFRAN) injection 4 mg, 4 mg, IntraVENous, Q4H PRN, Tyler Teresa MD 
  prochlorperazine (COMPAZINE) with saline injection 10 mg, 10 mg, IntraVENous, Q6H PRN, Tyler Teresa MD 
  sodium chloride (NS) flush 5-40 mL, 5-40 mL, IntraVENous, PRN, Tyler Teresa MD 
  albuterol (PROVENTIL VENTOLIN) nebulizer solution 2.5 mg, 2.5 mg, Inhalation, PRN, Deisy Varela MD, 2.5 mg at 02/09/21 4038   sodium chloride (NS) flush 5-40 mL, 5-40 mL, IntraVENous, Q8H, Kassandra Barrientos MD, 10 mL at 02/12/21 8016   sodium chloride (NS) flush 5-40 mL, 5-40 mL, IntraVENous, PRN, Kassandra Barrientos MD 
  acetaminophen (TYLENOL) tablet 650 mg, 650 mg, Oral, Q6H PRN **OR** [DISCONTINUED] acetaminophen (TYLENOL) suppository 650 mg, 650 mg, Rectal, Q6H PRN, Kassandra Barrientos MD 
  polyethylene glycol (MIRALAX) packet 17 g, 17 g, Oral, DAILY PRN, Kassandra Barrientos MD 
  promethazine (PHENERGAN) tablet 12.5 mg, 12.5 mg, Oral, Q6H PRN **OR** ondansetron (ZOFRAN) injection 4 mg, 4 mg, IntraVENous, Q6H PRN, Kassandra Barrientos MD 
  0.9% sodium chloride infusion 250 mL, 250 mL, IntraVENous, PRN, Kassandra Barrientos MD 
  NOREPINephrine (LEVOPHED) 4 mg in 5% dextrose 250 mL infusion, 0.5-16 mcg/min, IntraVENous, TITRATE, Jaida Dailey MD, Last Rate: 45 mL/hr at 02/12/21 0846, 12 mcg/min at 02/12/21 0846 
  0.9% sodium chloride infusion 250 mL, 250 mL, IntraVENous, PRN, Jaida Dailey MD 
 
No Known Allergies Review of Systems Intubated patient Visit Vitals /65 (BP 1 Location: Right upper arm, BP Patient Position: At rest) Pulse 80 Temp 97.6 °F (36.4 °C) Resp 22 Ht 6' 7\" (2.007 m) Comment: per EMR Wt 247 lb 2.2 oz (112.1 kg) SpO2 99% BMI 27.84 kg/m² Neurologic Exam 
Acutely ill icteric profound ascites and anasarca. Intubated Neurologically no response to voice or deep painful stimulation pupils midpoint 3 mm no extraocular movements but note the patient had received 2 mg IV Ativan 20 minutes prior Trace corneal motor tone is flaccid throughout no metabolic flap identified Most recent MRI No results found for this or any previous visit. Most recent MRA No results found for this or any previous visit. Most recent CTA No results found for this or any previous visit. Most recent Echo No results found for this visit on 02/06/21. Most recent lipid panels Lab Results Component Value Date/Time Triglyceride 98 02/11/2021 03:55 AM  
 
 
Most recent Hgb A1C No results found for: HBA1C, HGBE8, [id number], [id number] Diagnoses and all orders for this visit: 1. Coffee ground emesis 2. UGIB (upper gastrointestinal bleed) 3. Symptomatic anemia 4. Acute respiratory failure with hypoxia ([facility] [address] 5. Hematemesis with nausea 6. Anemia, unspecified type 7. Encounter for weaning from ventilator [facility]) 8. Esophageal bleeding 9. Alcoholic cirrhosis of liver with ascites ([facility] [address] 10. Severe protein-calorie malnutrition ([facility] [address] 11. Hepatitis C antibody positive in blood 12. Ascites due to alcoholic cirrhosis (HCC) 13. Hemorrhagic shock ([facility] [address] Other orders 
-     CBC WITH AUTOMATED DIFF; Standing -     METABOLIC PANEL, COMPREHENSIVE; Standing 
-     TYPE & SCREEN; Standing -     LIPASE; Standing 
-     EKG, 12 LEAD, INITIAL; Standing 
-     URINALYSIS W/ RFLX MICROSCOPIC; Standing 
-     PROTHROMBIN TIME + INR; Standing 
-     PTT; Standing 
-     sodium chloride 0.9 % bolus infusion 1,000 mL 
-     pantoprazole (PROTONIX) 40 mg in 0.9% sodium chloride 10 mL injection 
-     cefTRIAXone (ROCEPHIN) 1 g in 0.9% sodium chloride (MBP/ADV) 50 mL MBP 
-     octreotide (SANDOSTATIN) injection 100 mcg 
-     ETHYL ALCOHOL; Standing 
-     EKG, 12 LEAD, INITIAL; Standing -     MAGNESIUM; Standing 
-     ondansetron (ZOFRAN) injection 4 mg 
-     CK; Standing 
-     CULTURE, BLOOD; Standing 
-     CULTURE, BLOOD; Standing -     promethazine (PHENERGAN) with saline injection 12.5 mg 
-     EKG NOTEWRITER(ASAP ONLY); Standing -     AMMONIA; Standing 
-     0.9% sodium chloride infusion 250 mL -     TRANSFUSE PACKED RBC'S; Standing 
-     0.9% sodium chloride infusion 250 mL 
-     RBC, ALLOCATE; Standing 
-     CRITICAL CARE (ASAP ONLY); Standing -     pantoprazole (PROTONIX) 40 mg in 0.9% sodium chloride 10 mL injection -     VERIFY CONSENT HAS BEEN OBTAINED; Standing -     IRON; Standing 
-     VITAMIN B12; Standing 
-     FOLATE; Standing 
-     dextrose 5% and 0.9% NaCl infusion -     INITIAL PHYSICIAN ORDER: INPATIENT; Standing 
-     metoclopramide HCl (REGLAN) injection 5 mg 
-     ondansetron (ZOFRAN) injection 4 mg -     prochlorperazine (COMPAZINE) with saline injection 10 mg 
-     thiamine (B-1) 100 mg in 0.9% sodium chloride 50 mL IVPB 
-     sodium chloride (NS) flush 5-40 mL 
-     FULL CODE; Standing -     INITIAL PHYSICIAN ORDER: INPATIENT; Standing 
-     FALL PRECAUTIONS; Standing 
-     VITAL SIGNS PER UNIT ROUTINE; Standing 
-     BAUER CATHETER, INSERTION; Standing 
-     CBC WITH AUTOMATED DIFF; Standing -     APPLY/MAINTAIN SEQUENTIAL COMPRESSION DEVICE; Standing 
-     OXYGEN CANNULA; Standing 
-     NOTIFY PROVIDER: SPECIFY; Standing 
-     VITAL SIGNS; Standing 
-     NEUROLOGIC STATUS ASSESSMENT; Standing -     PAIN ASSESSMENT; Standing -     IV SITE CARE; Standing 
-     NOTIFY PROVIDER: VITAL SIGNS CHANGES; Standing 
-     NOTIFY PROVIDER: SPECIFY; Standing 
-     NURSING-MISCELLANEOUS:; Standing 
-     albuterol (PROVENTIL VENTOLIN) nebulizer solution 2.5 mg 
-     XR CHEST PORT; Standing -     IP CONSULT TO INTENSIVIST; Standing 
-     INTUBATE PATIENT; Standing -     CARDIAC MONITORING; Standing 
-     INTAKE AND OUTPUT; Standing -     IP CONSULT TO RESPIRATORY CARE; Standing 
-     sodium chloride (NS) flush 5-40 mL 
-     sodium chloride (NS) flush 5-40 mL 
-     acetaminophen (TYLENOL) tablet 650 mg 
-     polyethylene glycol (MIRALAX) packet 17 g -     promethazine (PHENERGAN) tablet 12.5 mg 
-     ondansetron (ZOFRAN) injection 4 mg -     BEDREST, COMPLETE; Standing 
-     DIET NPO; Standing -     ELEVATE HEAD OF BED; Standing -     METABOLIC PANEL, COMPREHENSIVE; Standing -     MAGNESIUM; Standing 
-     PHOSPHORUS; Standing 
-     PROTHROMBIN TIME + INR; Standing -     piperacillin-tazobactam (ZOSYN) 3.375 g in 0.9% sodium chloride (MBP/ADV) 100 mL MBP 
-     desmopressin (DDAVP) 24.48 mcg in 0.9% sodium chloride 50 mL IVPB 
-     0.9% sodium chloride infusion 250 mL -     PLATELETS, ALLOCATE; Standing -     PLASMA, ALLOCATE; Standing -     TRANSFUSE PLASMA; Standing -     TRANSFUSE PACKED RBC'S; Standing 
-     BLOOD GAS, ARTERIAL; Standing 
-     CBC W/O DIFF; Standing 
-     XR CHEST SNGL V; Standing -     phytonadione (vitamin K1) (AQUA-MEPHYTON) 10 mg in 0.9% sodium chloride 50 mL IVPB 
-     BLOOD GAS, ARTERIAL; Standing -     APPLY SEQUENTIAL COMPRESSION DEVICE; Standing -     APPLY/MAINTAIN SEQUENTIAL COMPRESSION DEVICE; Standing 
-     POC G3; Standing 
-     NOREPINephrine (LEVOPHED) 4 mg in 5% dextrose 250 mL infusion -     XR CHEST SNGL V; Standing 
-     NURSING-MISCELLANEOUS:; Standing 
-     0.9% sodium chloride infusion 250 mL -     TRANSFUSE PLATELETS; Standing 
-     RESTRAINTS (NON-VIOLENT); Standing 
-     POC G3; Standing 
-     albumin human 5% (BUMINATE) solution 25 g 
-     XR CHEST SNGL V; Standing 
-     POC G3; Standing 
-     HEMOGLOBIN; Standing -     MAGNESIUM; Standing 
-     PHOSPHORUS; Standing 
-     BLOOD GAS, ARTERIAL; Standing 
-     HEPATIC FUNCTION PANEL; Standing 
-     PROTHROMBIN TIME + INR; Standing 
-     POC G3; Standing 
-     NUTRITIONAL SUPPORT ELECTROLYTE PRN ORDERS 
-     potassium chloride 20 mEq in 100 ml IVPB 
-     CALCIUM, IONIZED; Standing 
-     lactulose (CHRONULAC) 10 gram/15 mL solution 300 mL 
-     HEPATIC FUNCTION PANEL; Standing 
-     PROTHROMBIN TIME + INR; Standing 
-     POTASSIUM; Standing 
-     nicotine (NICODERM CQ) 14 mg/24 hr patch 1 Patch 
-     LORazepam (ATIVAN) 2 mg/mL injection -     LORazepam (ATIVAN) injection 2 mg 
-     albuterol-ipratropium (DUO-NEB) 2.5 MG-0.5 MG/3 ML 
-     RT--CHEST PT W/WO POSTURAL DRAINAGE; Standing -     CARDIAC MONITORING; Standing -     potassium chloride 20 mEq in 100 ml IVPB 
-     POC G3; Standing 
-     XR CHEST PORT; Standing -     IP CONSULT TO PHARMACY - VANCOMYCIN DOSING; Standing 
-     haloperidol lactate (HALDOL) injection 2 mg 
-     vancomycin (VANCOCIN) 2000 mg in  ml infusion -     IP CONSULT TO NUTRITION SERVICES; Standing 
-     succinylcholine (ANECTINE) injection 120 mg 
-     etomidate (AMIDATE) 2 mg/mL injection 24 mg 
-     propofol (DIPRIVAN) 10 mg/mL infusion 
-     fentaNYL in normal saline (pf) 25 mcg/mL infusion 
-     etomidate (AMIDATE) 2 mg/mL injection 
-     succinylcholine chloride 200 mg/10 mL (20 mg/mL) 200 mg/10 ml syringe -     ANESTHESIA INTUBATION; Standing 
-     XR CHEST PORT; Standing 
-     BLOOD GAS, ARTERIAL; Standing 
-     RT--MECHANICAL VENTILATOR; Standing 
-     RT--WEANING VENTILATOR PROTOCOL; Standing -     ANESTHESIA ARTERIAL LINE; Standing -     MAGNESIUM; Standing 
-     PHOSPHORUS; Standing -     TRIGLYCERIDE; Standing 
-     POC G3; Standing -     TPN ADULT-CENTRAL - dextrose 15% amino acid 5% 
-     fat emulsion 20% (LIPOSYN, INTRAlipid) infusion 250 mL -     METABOLIC PANEL, BASIC; Standing -     MAGNESIUM; Standing 
-     PHOSPHORUS; Standing -     TRIGLYCERIDE; Standing 
-     POC G3; Standing -     potassium phosphate 20 mmol in 0.9% sodium chloride 250 mL infusion 
-     albumin human 5% (BUMINATE) solution 25 g 
-     GLUCOSE, POC; Standing -     VANCOMYCIN, TROUGH; Standing 
-     vanc trough reminder 
-     CBC W/O DIFF; Standing 
-     XR CHEST PORT; Standing -     TPN ADULT-CENTRAL - dextrose 15% amino acid 5% 
-     insulin regular (NOVOLIN R, HUMULIN R) injection 
-     POC GLUCOSE; Standing 
-     carboxymethylcellulose sodium (REFRESH LIQUIGEL) 1 % ophthalmic solution 1 Drop 
-     GLUCOSE, POC; Standing 
-     ofloxacin (Ocuflox) 0.3 % ophthalmic solution (Patient's Own Med) (Patient Supplied) -     GLUCOSE, POC; Standing 
-     POC G3; Standing -     potassium phosphate 20 mmol in 0.9% sodium chloride 250 mL infusion -     potassium chloride 20 mEq in 100 ml IVPB 
-     vancomycin (VANCOCIN) 1,000 mg in 0.9% sodium chloride 250 mL (VIAL-MATE) -     CBC W/O DIFF; Standing 
-     PTT; Standing 
-     FIBRINOGEN; Standing 
-     0.9% sodium chloride 500 mL with mvi, adult no. 4 with vit K 10 mL, thiamine 676 mg, folic acid 1 mg infusion -     GLUCOSE, POC; Standing 
-     XR CHEST PORT; Standing -     IP CONSULT TO NEUROLOGY; Standing -     LORazepam (ATIVAN) injection 2 mg -     LORazepam (ATIVAN) 2 mg/mL injection 
-     EEG; Standing -     AMMONIA; Standing 
-     thiamine (B-1) 100 mg in 0.9% sodium chloride 50 mL IVPB 
-     EEG; Standing Impression Chelo Speller of today's episode needs to be defined and may represent manifestation of liver failure or may represent ictal phenomena EEG is pending. In the meantime thiamine coverage and will check serum ammonia Patient severe ascites obvious evidence of severe liver disease however perfusional aspects historically unknown and there may well be watershed zone problems superimposed Critical care time spent in care of patient with multisystem failure and very unstable neurologic status 40 minutes Brooklynn Mahoney MD

## 2021-02-13 NOTE — PROGRESS NOTES
Ventilator check complete; patient has a #8. 0 ET tube secured at the 26 at the lip. Patient is sedated. Patient is not able to follow commands. Breath sounds are diminished. Trachea is midline, Negative for subcutaneous air, and chest excursion is symmetric. Patient is also Negative for cyanosis and is Negative for pitting edema. All alarms are set and audible. Resuscitation bag is at the head of the bed. Ventilator Settings Mode FIO2 Rate Tidal Volume Pressure PEEP I:E Ratio VC+  45 %   24 0.5 ml    8 cm H20  1:1.5 Peak airway pressure: 25 cm H2O Minute ventilation: 13.2 l/min [address], [place]

## 2021-02-13 NOTE — PROGRESS NOTES
Comprehensive Nutrition Assessment Type and Reason for Visit: Bharath Wheeler TPN Management [facility] Pulmonary) Nutrition Recommendations/Plan:  
Parenteral Nutrition: 
Resume TPN  
15%DEX/ 5%AA 1.8 L with 250 ml 20% Lipids daily To provide: 1778 kcal/d (88% of needs), 90 grams of protein/d (100% of needs), 270 grams of CHO/d and 4397-4906 ml of total volume/d (100% of needs). Levophed contributes <100 calorie/day. Diprivan has been stopped. Lytes/L:  
Sodium 40 meq (40 meq NaCl), Potassium 40 meq (10 meq KCl, 30 meq KPO4), 8 meq Mg, 0 meq Calcium Other additives: MTE 
IVF: 
D10W will stop when TPN is resumed. Vitamin and Mineral Supplement Therapy: 
Electrolyte management replacement protocol is active. Labs:  
? BMP daily, Phos and Mg MWF. POC Glucoses/SSI continue. Malnutrition Assessment: 
Malnutrition Status: Severe malnutrition Context: Chronic illness Findings of clinical characteristics of malnutrition:   
Body Fat Loss:  7 - Severe body fat loss, Buccal region, Fat overlying ribs, Orbital, Triceps Muscle Mass Loss:  7 - Severe muscle mass loss, Clavicles (pectoralis &deltoids), Temples (temporalis) Fluid Accumulation:  7 - Severe, Ascites, Extremities  Strength:  Not performed Nutrition Assessment:  
Nutrition History: Family member reports that the patient \"has not been eating for awhile and had N/V x 3 days. \"     
Nutrition Background: Patient is a 60 y.o.  male presents with hematemesis. Patient per notes from ER and chart has history of ETOH abuse and had dark emesis for 3 days. Found down by family and unresponsive with SBP of 80/42 and hg of 8.7. Per chart son told staff stopped ETOH about 2 months after years of a case a day. Also using NSAIDS, TUMS and pepto bismo. Recently noted to have HCV+ and cirrhotic on US. Daily Update: 
Remains ventilated, sedation being weaned. Questionable seizure activity. Awaiting MRI. Abdominal Status (last documented): Ascites, Distended, Tympanic abdomen with Absent , Distant bowel sounds. Last BM 02/13/21. Pertinent Medications: SSI coverage, Lactulose enemas, Protonix, Vancomycin Drips: Fentanyl, Levophed IVF: D10W 
Pertinent Labs: AM glucose 156; POC glucose (2/12) 660,516,565 and (2/13) 52,64 Nutrition Related Findings:  
Upper body muscle wasting. +Ascites. + all extremities edema. Current Nutrition Therapies: 
D10W infusing @ 42 ml/hr. Current Intake: Additional Caloric Sources: ( ) Anthropometric Measures: 
Height: 6' 7\" (200.7 cm)(per EMR) Current Body Wt: 112.1 kg (247 lb 2.2 oz)(2/12/21), Weight source: Bed scale BMI: 27.8, Ideal Body Weight (lbs) (Calculated): 220 lbs (100 kg), 107.2 % Usual Body Wt: 81.6 kg (180 lb), Percent weight change: 31.1 Estimated Daily Nutrient Needs: 
Energy (kcal/day): 4291-3671 (25-30 octavio/kg/day - vent) (Kcal/kg, Weight Used: Usual) Protein (g/day): 81-97 (1-1.2 gm pro/kg/day) Weight Used: (Usual) Fluid (ml/day):   (1 ml/kcal) Nutrition Diagnosis: · Severe malnutrition, In context of chronic illness related to inadequate protein-energy intake as evidenced by severe loss of subcutaneous fat, severe muscle loss, localized or generalized fluid accumulation · Inadequate protein-energy intake related to altered GI function(GIB) as evidenced by nutrition support-parenteral nutrition(no enteral access allowed per GI ) Nutrition Interventions:  
Food and/or Nutrient Delivery: Modify parenteral nutrition Coordination of Nutrition Care: Continue to monitor while inpatient Goals:  
Previous Goal Met: Progressing toward goal(s) Active Goal: Meet >75% of daily nutrition needs within 5 days. Nutrition Monitoring and Evaluation:  
  
Food/Nutrient Intake Outcomes: Parenteral nutrition intake/tolerance Physical Signs/Symptoms Outcomes: Biochemical data, GI status Discharge Planning: Too soon to determine [place] Fleeting. Maria Guadalupe White RD, LD on 2/13/2021 at 1:38 PM 
Contact: [phone] Disaster Mode active Seizure disorder, secondary Generalized seizure

## 2021-02-13 NOTE — PROGRESS NOTES
Called ICU and requested RN completion of MRI screening and consent so that ordered study may be done as soon as possible.

## 2021-02-13 NOTE — PROGRESS NOTES
Neurology Daily Progress Note Assessment:  
 
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 Comatosed. EEG showed BiPLEDs, consistent with most likely an underlying hypoperfusional injury. Likely poor prognosis. Recommend palliative care consult to discuss goals of care. Will obtain MRI of brain for prognostication. Plan: MRI of brain pending for further prognostication. Continue supportive therapies Recommend palliative care consult to discuss goals of care. Subjective: Interval history: 
 
Remains intubated. Off sedation. Does not follow commands. Does not withdraw from pain. EEG showed BiPLEDs, consistent with most likely an underlying hypoperfusional injury. History: 
 
Lindsay Gotti is a 60 y.o. male who is being seen for AMS, seizure like activity. Review of systems negative with exception of pertinent positives and negatives noted above. Objective:  
 
Vitals:  
 02/13/21 1315 02/13/21 1330 02/13/21 1345 02/13/21 1358 BP:      
Pulse: (!) 114 (!) 115 (!) 113 (!) 112 Resp: 23 23 25 29 Temp: 99.6 °F (37.6 °C) 99.6 °F (37.6 °C) 99.6 °F (37.6 °C) SpO2: 97% 96% 97% 97% Weight:      
Height:      
  
 
 
Current Facility-Administered Medications:  
  dextrose 10% infusion, 42 mL/hr, IntraVENous, CONTINUOUS, Kimberly Matthews MD, Last Rate: 42 mL/hr at 02/13/21 0200, 42 mL/hr at 02/13/21 0200   meropenem (MERREM) 500 mg in 0.9% sodium chloride (MBP/ADV) 50 mL MBP, 0.5 g, IntraVENous, Q6H, Loren Olguin MD, Last Rate: 100 mL/hr at 02/13/21 1344, 500 mg at 02/13/21 1344 
  NOREPINephrine (LEVOPHED) 16,000 mcg in dextrose 5% 250 mL infusion, 0.5-30 mcg/min, IntraVENous, TITRATE, Kimberly Matthews MD, Last Rate: 16.9 mL/hr at 02/13/21 0808, 18 mcg/min at 02/13/21 5440   [START ON 2/14/2021] Vancomycin Trough Level Reminder, , Other, Nathanael Seth MD 
   TPN ADULT-CENTRAL - dextrose 15% amino acid 5%, , IntraVENous, QPM, Catrachita Briggs MD 
  vancomycin (VANCOCIN) 1,000 mg in 0.9% sodium chloride 250 mL (VIAL-MATE), 1,000 mg, IntraVENous, Q12H, Guy Dailey MD, 1,000 mg at 02/13/21 0832 
  insulin regular (NOVOLIN R, HUMULIN R) injection, , SubCUTAneous, Q6H, Jeffy Montesinos MD, Stopped at 02/13/21 0300   carboxymethylcellulose sodium (REFRESH LIQUIGEL) 1 % ophthalmic solution 1 Drop, 1 Drop, Both Eyes, PRN, Xavi Dailey MD, 1 Drop at 02/12/21 8134   ofloxacin (Ocuflox) 0.3 % ophthalmic solution (Patient's Own Med) (Patient Supplied), 1 Drop, Left Eye, QID, Xavi Dailey MD, 1 Drop at 02/13/21 1344 
  haloperidol lactate (HALDOL) injection 2 mg, 2 mg, IntraVENous, Q8H PRN, Jeffy Montesinos MD, 2 mg at 02/10/21 3515   propofol (DIPRIVAN) 10 mg/mL infusion, 0-50 mcg/kg/min, IntraVENous, TITRATE, Jeffy Montesinos MD, Stopped at 02/13/21 2061   fentaNYL in normal saline (pf) 25 mcg/mL infusion, 0-200 mcg/hr, IntraVENous, TITRATE, Jeffy Montesinos MD, Last Rate: 1 mL/hr at 02/13/21 0703, 25 mcg/hr at 02/13/21 0703   fat emulsion 20% (LIPOSYN, INTRAlipid) infusion 250 mL, 250 mL, IntraVENous, QPM, Jeffy Montesinos MD, 250 mL at 02/12/21 1833 
  NUTRITIONAL SUPPORT ELECTROLYTE PRN ORDERS, , Does Not Apply, PRN, Chito Tan MD 
  lactulose (CHRONULAC) 10 gram/15 mL solution 300 mL, 200 g, Rectal, TID, Dominic Jc NP, 300 mL at 02/13/21 0900 
  nicotine (NICODERM CQ) 14 mg/24 hr patch 1 Patch, 1 Patch, TransDERmal, Q24H, Jeffy Montesinos MD, 1 Patch at 02/11/21 1616   albuterol-ipratropium (DUO-NEB) 2.5 MG-0.5 MG/3 ML, 3 mL, Nebulization, Q6H PRN, Jeffy Montesinos MD 
  0.9% sodium chloride infusion 250 mL, 250 mL, IntraVENous, PRN, Kacey Dang Mask., MD 
  0.9% sodium chloride infusion 250 mL, 250 mL, IntraVENous, PRN, Krishan Starr, NP 
   pantoprazole (PROTONIX) 40 mg in 0.9% sodium chloride 10 mL injection, 40 mg, IntraVENous, Q12H, Hay Ang, NP, 40 mg at 02/13/21 1808   ondansetron (ZOFRAN) injection 4 mg, 4 mg, IntraVENous, Q4H PRN, Eliazar Nuñez MD 
  prochlorperazine (COMPAZINE) with saline injection 10 mg, 10 mg, IntraVENous, Q6H PRN, Eliazar Nuñez MD 
  sodium chloride (NS) flush 5-40 mL, 5-40 mL, IntraVENous, PRN, Eliazar Nuñez MD 
  albuterol (PROVENTIL VENTOLIN) nebulizer solution 2.5 mg, 2.5 mg, Inhalation, PRN, Wendy Sosa MD, 2.5 mg at 02/09/21 1645   sodium chloride (NS) flush 5-40 mL, 5-40 mL, IntraVENous, Q8H, Eliazar Nuñez MD, 10 mL at 02/13/21 1345   sodium chloride (NS) flush 5-40 mL, 5-40 mL, IntraVENous, PRN, Eliazar Nuñez MD 
  acetaminophen (TYLENOL) tablet 650 mg, 650 mg, Oral, Q6H PRN **OR** [DISCONTINUED] acetaminophen (TYLENOL) suppository 650 mg, 650 mg, Rectal, Q6H PRN, Eliazar Nuñez MD 
  polyethylene glycol (MIRALAX) packet 17 g, 17 g, Oral, DAILY PRN, Eliazar Nuñez MD 
  promethazine (PHENERGAN) tablet 12.5 mg, 12.5 mg, Oral, Q6H PRN **OR** ondansetron (ZOFRAN) injection 4 mg, 4 mg, IntraVENous, Q6H PRN, Eliazar Nuñez MD 
  0.9% sodium chloride infusion 250 mL, 250 mL, IntraVENous, PRNEliazar MD 
  0.9% sodium chloride infusion 250 mL, 250 mL, IntraVENous, PRN, Sharri Dailey MD 
 
Recent Results (from the past 12 hour(s)) POC G3 Collection Time: 02/13/21  3:27 AM  
Result Value Ref Range Device: VENT    
 FIO2 (POC) 45 % pH (POC) 7.31 (L) 7.35 - 7.45    
 pCO2 (POC) 41.8 35 - 45 MMHG  
 pO2 (POC) 77 75 - 100 MMHG  
 HCO3 (POC) 21.2 (L) 22 - 26 MMOL/L  
 sO2 (POC) 94 (L) 95 - 98 % Base deficit (POC) 5 mmol/L Mode ASSIST CONTROL Tidal volume 500 ml Set Rate 22 bpm  
 PEEP/CPAP (POC) 8 cmH2O Allens test (POC) NOT APPLICABLE Inspiratory Time 1 sec Site DRAWN FROM ARTERIAL LINE Specimen type (POC) ARTERIAL Performed by [place] CO2, POC 22 MMOL/L Respiratory comment: NurseNotified Exhaled minute volume 12.70 L/min COLLECT TIME 324 GLUCOSE, POC Collection Time: 02/13/21  6:14 AM  
Result Value Ref Range Glucose (POC) 96 65 - 100 mg/dL GLUCOSE, POC Collection Time: 02/13/21 11:41 AM  
Result Value Ref Range Glucose (POC) 83 65 - 100 mg/dL Physical Exam: 
General - ill appearing, cachetic, in no apparent distress. Intubated. HEENT - Normocephalic, atraumatic. Conjunctiva are clear. Neck - Supple without masses Cardiovascular - Regular rate and rhythm. Normal S1, S2 without murmurs, rubs, or gallops. Lungs - Clear to auscultation. Abdomen - Soft, nontender with normal bowel sounds. Extremities - Peripheral pulses intact. No edema and no rashes. Neurological examination Level of consciousness- comatosed, Intubated, off sedation Brain stem reflexes 
-Pupillary reflex to bright light: 2 mm PERRL 
-Ciliospinal reflexes: absent 
-Oculovestibular and/or oculocephalic reflex response: absent 
-Corneal reflex: present 
-Facial movement to painful stimulus at supraorbital nerve, temporomandibular joint: absent 
-Cough/gag reflex: absent Motor examination 
-Muscle tone: flaccid 
-Motor response to pain: does not respond to pain in all extremities -Muscle stretch reflexes: areflexic 
-Response to plantar stimulation: mute Signed By: Darius Pierson NP February 13, 2021 Patient seen and examined Extremely poor neurologic function The electroencephalographic pattern is periodic Current literature would indicate that this severe encephalopathy in a severe pan metabolic situation has an extraordinarily poor prognosis, with this degree of liver failure I would concur that MRI in terms of laminar necrosis will be helpful as a confirmatory measure however the EEG itself is I think very prognostic

## 2021-02-13 NOTE — PROGRESS NOTES
Critical Care Daily Progress Note: 2/13/2021 Itzel Alejo Admission Date: 2/6/2021 The patient's chart is reviewed and the patient is discussed with the staff. Patient is a 60 y.o.  male presents with hematemesis. Patient per notes from ER and chart has history of ETOH abuse and had dark emesis for 3 days. Found down by family and unresponsive with SBP of 80/42 and hg of 8.7. Per chart son told staff stopped ETOH about 2 months after years of a case a day. Also using NSAIDS, TUMS and pepto bismo. Recently noted to have HCV+ and cirrhotic on US. 
  
Patient seen by GI STAT and felt unstable from likely decompensated cirrhosis and suspected PVT. The did EGD and Anesthesia intubated patient and took to OR.  
  
GI endoscopy noted to have diffusely ulcerated esophagus. No active varices or ulcer noted. Tissue was reported friable and used hemospray instead and coated esophagus.  
  
Get get 2 units of PRBC and ordered FFP and platelets.  
  
Patient now in ICU on Vent and we were asked to take over care now.  
  
 
  
    
Subjective:  
 
Had seizure like activity yesterday. Neuro consulted and EEG negative for sz but looks to have severe encephalopathy. MRI ordered. Remains on levo at 18. Current Facility-Administered Medications Medication Dose Route Frequency  dextrose 10% infusion  42 mL/hr IntraVENous CONTINUOUS  
 meropenem (MERREM) 500 mg in 0.9% sodium chloride (MBP/ADV) 50 mL MBP  0.5 g IntraVENous Q6H  
 NOREPINephrine (LEVOPHED) 16,000 mcg in dextrose 5% 250 mL infusion  0.5-30 mcg/min IntraVENous TITRATE  vancomycin (VANCOCIN) 1,000 mg in 0.9% sodium chloride 250 mL (VIAL-MATE)  1,000 mg IntraVENous Q12H  
 insulin regular (NOVOLIN R, HUMULIN R) injection   SubCUTAneous Q6H  
 carboxymethylcellulose sodium (REFRESH LIQUIGEL) 1 % ophthalmic solution 1 Drop  1 Drop Both Eyes PRN  
  ofloxacin (Ocuflox) 0.3 % ophthalmic solution (Patient's Own Med) (Patient Supplied)  1 Drop Left Eye QID  haloperidol lactate (HALDOL) injection 2 mg  2 mg IntraVENous Q8H PRN  propofol (DIPRIVAN) 10 mg/mL infusion  0-50 mcg/kg/min IntraVENous TITRATE  fentaNYL in normal saline (pf) 25 mcg/mL infusion  0-200 mcg/hr IntraVENous TITRATE  fat emulsion 20% (LIPOSYN, INTRAlipid) infusion 250 mL  250 mL IntraVENous QPM  
 NUTRITIONAL SUPPORT ELECTROLYTE PRN ORDERS   Does Not Apply PRN  
 lactulose (CHRONULAC) 10 gram/15 mL solution 300 mL  200 g Rectal TID  nicotine (NICODERM CQ) 14 mg/24 hr patch 1 Patch  1 Patch TransDERmal Q24H  
 albuterol-ipratropium (DUO-NEB) 2.5 MG-0.5 MG/3 ML  3 mL Nebulization Q6H PRN  
 0.9% sodium chloride infusion 250 mL  250 mL IntraVENous PRN  
 0.9% sodium chloride infusion 250 mL  250 mL IntraVENous PRN  pantoprazole (PROTONIX) 40 mg in 0.9% sodium chloride 10 mL injection  40 mg IntraVENous Q12H  
 ondansetron (ZOFRAN) injection 4 mg  4 mg IntraVENous Q4H PRN  prochlorperazine (COMPAZINE) with saline injection 10 mg  10 mg IntraVENous Q6H PRN  
 sodium chloride (NS) flush 5-40 mL  5-40 mL IntraVENous PRN  
 albuterol (PROVENTIL VENTOLIN) nebulizer solution 2.5 mg  2.5 mg Inhalation PRN  
 sodium chloride (NS) flush 5-40 mL  5-40 mL IntraVENous Q8H  
 sodium chloride (NS) flush 5-40 mL  5-40 mL IntraVENous PRN  
 acetaminophen (TYLENOL) tablet 650 mg  650 mg Oral Q6H PRN  polyethylene glycol (MIRALAX) packet 17 g  17 g Oral DAILY PRN  promethazine (PHENERGAN) tablet 12.5 mg  12.5 mg Oral Q6H PRN Or  
 ondansetron (ZOFRAN) injection 4 mg  4 mg IntraVENous Q6H PRN  
 0.9% sodium chloride infusion 250 mL  250 mL IntraVENous PRN  
 0.9% sodium chloride infusion 250 mL  250 mL IntraVENous PRN Review of Systems Unobtainable due to patient status. Objective:  
 
Vitals:  
 02/13/21 0915 02/13/21 0930 02/13/21 0945 02/13/21 1000 BP:      
 Pulse: (!) 121 (!) 120 (!) 122 (!) 120 Resp: 25 25 24 25 Temp: 100.4 °F (38 °C) 100.4 °F (38 °C) (!) 100.5 °F (38.1 °C) (!) 100.5 °F (38.1 °C) SpO2: 94% 94% 95% 94% Weight:      
Height:      
 
 
Intake/Output Summary (Last 24 hours) at 2/13/2021 1055 Last data filed at 2/13/2021 7863 Gross per 24 hour Intake 4380.45 ml Output 680 ml Net 3700.45 ml Physical Exam:         
Constitutional: frail appearing, intubated EENMT:  Mildly jaundiced, pupils equal, oral mucosa moist 
Respiratory: ETT, vented, coarse rhonchi bilaterally Cardiovascular:  TachyRRR, no M/R/G Gastrointestinal: ascites, distended Musculoskeletal: warm without cyanosis. Trace significant LE edema; Scattered ecchymoses Neurologic: sedated Psychiatric:  sedated LINES: 
Quad Lumen quad lumen 02/06/21 Left Internal jugular Urinary Catheter 02/06/21 2- way Airway - Endotracheal Tube 02/6 - extubated 2/9 - reintubated 2/10 DRIPS: 
D10 Levophed 18 CXR:  
 
2/13: 
 
Unable to see or copy image. FINDINGS: 
  
Diffuse bilateral airspace densities, similar to prior exam. Bilateral pleural 
effusions are suggested. No pneumothorax. Heart appears mildly enlarged. Mediastinal contour is within normal limits. Endotracheal tube and left-sided IJ 
line are stable. 
  
  
IMPRESSION 
  
1. Persistent bilateral airspace densities. Differential diagnosis includes 
pulmonary edema and pneumonia. 2/10 - worsening bilateral airspace disease (R>L) LAB Recent Labs  
  02/13/21 
0614 02/13/21 
0059 02/12/21 
1603 02/12/21 
0904 02/11/21 
2121 GLUCPOC 96 125* 133* 125* 143* Recent Labs  
  02/13/21 
0222 02/12/21 26 02/12/21 
0351 02/11/21 
0355 WBC 13.3* 10.1 8.1 9.0 HGB 10.5* 10.3* 10.4* 11.4* HCT 32.1* 30.9* 32.7* 35.1*  
PLT 37* 39* 43* 74* INR 2.3  --  2.2 2.1 Recent Labs  
  02/13/21 
0222 02/12/21 
0351 02/11/21 
0355  142 142  
K 3.8 3.5 3.6 * 110* 111* CO2 26 24 24 * 139* 220* BUN 21 27* 31* CREA 0.74* 0.88 1.10 MG  --  2.1 2.1 PHOS  --  2.2* 2.0*  
CA 7.8* 7.8* 7.8* ALB 2.8* 2.5* 2.1* TBILI 6.8* 5.3* 5.3* ALT 20 19 18 Recent Labs  
  02/13/21 0327 02/12/21 0156 02/11/21 0221 PHI 7.31* 7.28* 7.34* PCO2I 41.8 51.6* 42.4 PO2I 77 82 94 HCO3I 21.2* 24.1 23.0 No results for input(s): LCAD, LAC in the last 72 hours. Recent Labs  
  02/13/21 0327 02/12/21 0156 02/11/21 0221 PHI 7.31* 7.28* 7.34* PCO2I 41.8 51.6* 42.4 PO2I 77 82 94 HCO3I 21.2* 24.1 23.0 Patient Active Problem List  
Diagnosis Code  Hyponatremia E87.1  Liver cirrhosis ([facility] K74.60  Ascites R18.8  Elevated bilirubin R17  Alcohol dependence (HCC) F10.20  
 Nicotine dependence F17.200  Thrombocytopenia ([facility] D69.6  Diarrhea R19.7  DNR (do not resuscitate) 466 8983  Hepatitis C antibody positive in blood R76.8  Anemia D64.9  
 Hematemesis with nausea K92.0  Hemorrhagic shock (HCC) R57.8  Esophageal bleeding K22.8  Encounter for weaning from ventilator [facility]) Z99.11  Severe protein-calorie malnutrition ([facility] E43 Assessment:  (Medical Decision Making) Hospital Problems  Date Reviewed: 2/6/2021 Codes Class Noted POA Anemia ICD-10-CM: D64.9 ICD-9-CM: 285.9  2/6/2021 Unknown Stable * (Principal) Hematemesis with nausea ICD-10-CM: K92.0 ICD-9-CM: 578.0, 787.02  2/6/2021 Unknown Resolved Hem[facility] ([facility] ICD-10-CM: R57.8 ICD-9-CM: 785.59  2/6/2021 Unknown Still on levo at 18 Esophageal bleeding ICD-10-CM: K22.8 ICD-9-CM: 530.82  2/6/2021 Unknown Encounter for weaning from ventilator [facility]) ICD-10-CM: Z99.11 ICD-9-CM: V46.13  2/6/2021 Unknown Too sick weaning Severe protein-calorie malnutrition ([facility] ICD-10-CM: T28 ICD-9-CM: 408  2/6/2021 Unknown Hepatitis C antibody positive in blood ICD-10-CM: R76.8 ICD-9-CM: 795.79  12/12/2020 Yes Liver cirrhosis (HCC) ICD-10-CM: K74.60 ICD-9-CM: 571.5  12/10/2020 Yes Severe Ascites ICD-10-CM: R18.8 ICD-9-CM: 789.59  12/10/2020 Yes Will likely need paracentesis to be able to wean vent. Encephalopathy: severe. Neuro ordered MRI Plan:  (Medical Decision Making) 60 M with cirrhosis who presented with UGI bleeding s/p scope revealing friable mucosa and ulcerated esophagus with hemorrhaging s/p intervention with GI Continue vent support. MRI today. Continue ABx for aspiration pneumonia. Follow coags; INR 2.3 today. Continue lactulose enemas 
TPN. PPX: on protonix, SCDs, holding chemical DVT PPX given concerns for esophageal bleeding and thrombocytopenia and elevated INR. Paracentesis with IR on Monday. Prognosis guarded Full Code Son, Gina Reyna, can be reached at [phone] - attempted to call this morning but he did not answer The patient is critically ill with respiratory failure, circulatory failure and requires high complexity decision making for assessment and support including frequent ventilator adjustment , frequent evaluation and titration of therapies , application of advanced monitoring technologies and extensive interpretation of multiple databases Cumulative time devoted to patient care services by me for day of service - 31 min Aimee Flores MD

## 2021-02-13 NOTE — PROGRESS NOTES
Attempted to call son for third time to get MRI consent. No answer, left another voicemail. Will wait for call back, and try again.

## 2021-02-13 NOTE — PROGRESS NOTES
Ventilator check complete; patient has a #8. 0 ET tube secured at the 26 at the lip. Patient is sedated. Patient is not able to follow commands. Breath sounds are diminished. Trachea is midline, Negative for subcutaneous air, and chest excursion is symmetric. Patient is also Negative for cyanosis and is Negative for pitting edema. All alarms are set and audible. Resuscitation bag is at the head of the bed. Ventilator Settings Mode FIO2 Rate Tidal Volume Ppl PEEP I:E Ratio VC+  45 % 22 500 ml   23 cm H20 8 cm H20  1:1.5 Peak airway pressure: 26 cm H2O Minute ventilation: 12.1 l/min Donald Ware, RT 
 [facility]

## 2021-02-13 NOTE — PROGRESS NOTES
Call placed to son, Bianca Johnson, for RN to complete MRI screening form. No answer, voicemail left requesting call back.

## 2021-02-13 NOTE — PROGRESS NOTES
Patient hypothermic- placed on nitin hugger and covered with warm blankets, increasingly hypotensive with sepsis alert- MD Olguin notified ordering meropenem

## 2021-02-14 NOTE — PROGRESS NOTES
Comprehensive Nutrition Assessment Type and Reason for Visit: William Cabot TPN Management [facility] Pulmonary) Nutrition Recommendations/Plan:  
· Parenteral Nutrition: · Continue TPN · 15%DEX/ 5%AA 1.8 L with 250 ml 20% Lipids daily · To provide: 1778 kcal/d (88% of needs), 90 grams of protein/d (100% of needs), 270 grams of CHO/d and 1065-0486 ml of total volume/d (100% of needs). · Lytes/L:  
· Sodium 60 meq (60 meq NaCl), Potassium 30 meq (30 meq KPO4), 8 meq Mg, 0 meq Calcium · Other additives: MTE · Vitamin and Mineral Supplement Therapy: · Electrolyte management replacement protocol is active. · Labs: · BMP daily, Phos and Mg MWF. POC Glucoses/SSI have been discontinued. Malnutrition Assessment: 
Malnutrition Status: Severe malnutrition Context: Chronic illness Findings of clinical characteristics of malnutrition:  
Body Fat Loss:  7 - Severe body fat loss, Buccal region, Fat overlying ribs, Orbital, Triceps Muscle Mass Loss:  7 - Severe muscle mass loss, Clavicles (pectoralis &deltoids), Temples (temporalis) Fluid Accumulation:  7 - Severe, Ascites, Extremities  Strength:  Not performed Nutrition Assessment:  
Nutrition History: Family member reports that the patient \"has not been eating for awhile and had N/V x 3 days. \"     
Nutrition Background: Patient is a 60 y.o.  male presents with hematemesis. Patient per notes from ER and chart has history of ETOH abuse and had dark emesis for 3 days. Found down by family and unresponsive with SBP of 80/42 and hg of 8.7. Per chart son told staff stopped ETOH about 2 months after years of a case a day. Also using NSAIDS, TUMS and pepto bismo. Recently noted to have HCV+ and cirrhotic on US. Daily Update: 
Remains ventilated and sedated. Awaiting MRI. Abdominal Status (last documented): Distended, Ascites, Semi-soft, Tympanic abdomen with Hypoactive  bowel sounds. Last BM 02/14/21. Pertinent Medications: SSI coverage, Lactulose enemas, Protonix, Vancomycin Drips: Fentanyl Pertinent Labs: sodium 137 (trending downward), potassium 4.2 (trending upwards), AM glucose 136; POC glucose (2/13) 68,76,661 and (2/14) 125 Nutrition Related Findings:  
Upper body muscle wasting. +Ascites. + all extremities edema. Left IJ QLC. Current Nutrition Therapies: 
Current Parenteral Nutrition Orders: · Type and Formula: 5%AA/15%DEX · Lipids: 250ml, Daily · Duration: Continuous · Rate/Volume: 75 ml/hr/1.8L · Current PN Order Provides: 0481 calories/day,90 gm protein/day in ~3221-4251 ml/day · Goal PN Orders Provides: 9397 calories/day, 90 gm protein/day in 3261-4355 ml volume/day Anthropometric Measures: 
Height: 6' 7\" (200.7 cm)(per EMR) Current Body Wt: 112.1 kg (247 lb 2.2 oz)(2/12/21), Weight source: Bed scale BMI: 27.8, Ideal Body Weight (lbs) (Calculated): 220 lbs (100 kg), 107.2 % Usual Body Wt: 81.6 kg (180 lb), Percent weight change: 31.1 Estimated Daily Nutrient Needs: 
Energy (kcal/day): 6025-5778 (25-30 octavio/kg/day - vent) (Kcal/kg, Weight Used: Usual) Protein (g/day): 81-97 (1-1.2 gm pro/kg/day) Weight Used: (Usual) Fluid (ml/day):   (1 ml/kcal) Nutrition Diagnosis: · Severe malnutrition, In context of chronic illness related to inadequate protein-energy intake as evidenced by severe loss of subcutaneous fat, severe muscle loss, localized or generalized fluid accumulation · Inadequate protein-energy intake related to altered GI function(GIB) as evidenced by nutrition support-parenteral nutrition(no enteral access allowed per GI ) Nutrition Interventions:  
Food and/or Nutrient Delivery: Modify parenteral nutrition Coordination of Nutrition Care: Continue to monitor while inpatient Plan of Care discussed with Eugenia Espinal RN. Goals:  
Previous Goal Met: Goal(s) achieved Active Goal: Continue to meet daily nutrition needs via TPN until able to transition to enteral nutrition support. Nutrition Monitoring and Evaluation:  
  
Food/Nutrient Intake Outcomes: Parenteral nutrition intake/tolerance Physical Signs/Symptoms Outcomes: Biochemical data, GI status Discharge Planning: Too soon to determine [place]. Angel Myles RD, LD on 2/14/2021 at 12:55 PM 
Contact: [phone] Disaster Mode active

## 2021-02-14 NOTE — PROGRESS NOTES
Ventilator check complete; patient has a #8. 0 ET tube secured at the 26 at the lip. Patient is sedated. Patient is not able to follow commands. Breath sounds are coarse. Trachea is midline, Negative for subcutaneous air, and chest excursion is symmetric. Patient is also Negative for cyanosis and is Negative for pitting edema. All alarms are set and audible. Resuscitation bag is at the head of the bed. Ventilator Settings Mode FIO2 Rate Tidal Volume  PEEP I:E Ratio VC+  45 % 24 500 ml    8 cm H20  1:1.5 Peak airway pressure: 26 cm H2O Minute ventilation: 13.4 l/min Yamileth Velásquez, RT

## 2021-02-14 NOTE — PROGRESS NOTES
Critical Care Daily Progress Note: 2/14/2021 Lindsay Gotti Admission Date: 2/6/2021 The patient's chart is reviewed and the patient is discussed with the staff. Patient is a 60 y.o.  male presents with hematemesis. Patient per notes from ER and chart has history of ETOH abuse and had dark emesis for 3 days. Found down by family and unresponsive with SBP of 80/42 and hg of 8.7. Per chart son told staff stopped ETOH about 2 months after years of a case a day. Also using NSAIDS, TUMS and pepto bismo. Recently noted to have HCV+ and cirrhotic on US. 
  
Patient seen by GI STAT and felt unstable from likely decompensated cirrhosis and suspected PVT. The did EGD and Anesthesia intubated patient and took to OR.  
  
GI endoscopy noted to have diffusely ulcerated esophagus. No active varices or ulcer noted. Tissue was reported friable and used hemospray instead and coated esophagus.  
  
Get get 2 units of PRBC and ordered FFP and platelets. Patient now in ICU on Vent and we were asked to take over care now.  
  
Neuro seeing for encephalopathy.  
  
    
Subjective: MRI remains pending as family cannot be contacted. Minimally responsive. On fentanyl 50. Off levophed. Current Facility-Administered Medications Medication Dose Route Frequency  VANCOMYCIN INTERMITTENT DOSING PER PHARMACY   Other Rx Dosing/Monitoring  meropenem (MERREM) 500 mg in 0.9% sodium chloride (MBP/ADV) 50 mL MBP  0.5 g IntraVENous Q6H  
 NOREPINephrine (LEVOPHED) 16,000 mcg in dextrose 5% 250 mL infusion  0.5-30 mcg/min IntraVENous TITRATE  TPN ADULT-CENTRAL - dextrose 15% amino acid 5%   IntraVENous QPM  
 insulin regular (NOVOLIN R, HUMULIN R) injection   SubCUTAneous Q6H  
 carboxymethylcellulose sodium (REFRESH LIQUIGEL) 1 % ophthalmic solution 1 Drop  1 Drop Both Eyes PRN  
 ofloxacin (Ocuflox) 0.3 % ophthalmic solution (Patient's Own Med) (Patient Supplied)  1 Drop Left Eye QID  haloperidol lactate (HALDOL) injection 2 mg  2 mg IntraVENous Q8H PRN  propofol (DIPRIVAN) 10 mg/mL infusion  0-50 mcg/kg/min IntraVENous TITRATE  fentaNYL in normal saline (pf) 25 mcg/mL infusion  0-200 mcg/hr IntraVENous TITRATE  fat emulsion 20% (LIPOSYN, INTRAlipid) infusion 250 mL  250 mL IntraVENous QPM  
 NUTRITIONAL SUPPORT ELECTROLYTE PRN ORDERS   Does Not Apply PRN  
 lactulose (CHRONULAC) 10 gram/15 mL solution 300 mL  200 g Rectal TID  nicotine (NICODERM CQ) 14 mg/24 hr patch 1 Patch  1 Patch TransDERmal Q24H  
 albuterol-ipratropium (DUO-NEB) 2.5 MG-0.5 MG/3 ML  3 mL Nebulization Q6H PRN  
 0.9% sodium chloride infusion 250 mL  250 mL IntraVENous PRN  
 0.9% sodium chloride infusion 250 mL  250 mL IntraVENous PRN  pantoprazole (PROTONIX) 40 mg in 0.9% sodium chloride 10 mL injection  40 mg IntraVENous Q12H  
 ondansetron (ZOFRAN) injection 4 mg  4 mg IntraVENous Q4H PRN  prochlorperazine (COMPAZINE) with saline injection 10 mg  10 mg IntraVENous Q6H PRN  
 sodium chloride (NS) flush 5-40 mL  5-40 mL IntraVENous PRN  
 albuterol (PROVENTIL VENTOLIN) nebulizer solution 2.5 mg  2.5 mg Inhalation PRN  
 sodium chloride (NS) flush 5-40 mL  5-40 mL IntraVENous Q8H  
 sodium chloride (NS) flush 5-40 mL  5-40 mL IntraVENous PRN  
 acetaminophen (TYLENOL) tablet 650 mg  650 mg Oral Q6H PRN  polyethylene glycol (MIRALAX) packet 17 g  17 g Oral DAILY PRN  promethazine (PHENERGAN) tablet 12.5 mg  12.5 mg Oral Q6H PRN Or  
 ondansetron (ZOFRAN) injection 4 mg  4 mg IntraVENous Q6H PRN  
 0.9% sodium chloride infusion 250 mL  250 mL IntraVENous PRN  
 0.9% sodium chloride infusion 250 mL  250 mL IntraVENous PRN Review of Systems Unobtainable due to patient status. Objective:  
 
Vitals:  
 02/14/21 0830 02/14/21 0838 02/14/21 0845 02/14/21 0900 BP:      
Pulse: (!) 115 (!) 113 (!) 117 (!) 117 Resp: 27 28 25 27 Temp: 97.7 °F (36.5 °C)  97.7 °F (36.5 °C) 97.7 °F (36.5 °C) SpO2: 96% 96% 94% 95% Weight:      
Height:      
 
 
Intake/Output Summary (Last 24 hours) at 2/14/2021 1103 Last data filed at 2/14/2021 7732 Gross per 24 hour Intake 3116.46 ml Output 1430 ml Net 1686.46 ml Physical Exam:         
Constitutional: frail appearing, intubated EENMT:   jaundiced, pupils equal, oral mucosa moist 
Respiratory: ETT, vented, coarse rhonchi bilaterally Cardiovascular:  Tachy RRR, no M/R/G Gastrointestinal: ascites, distended Musculoskeletal: warm without cyanosis. Trace significant LE edema; Scattered ecchymoses Neurologic: sedated Psychiatric:  sedated LINES: 
Quad Lumen quad lumen 02/06/21 Left Internal jugular Urinary Catheter 02/06/21 2- way Airway - Endotracheal Tube 02/6 - extubated 2/9 - reintubated 2/10 DRIPS: 
 
Levophed off CXR:  
 
2/14, 13: 
 
 
 
 
 
 
2/10 - worsening bilateral airspace disease (R>L) LAB Recent Labs  
  02/14/21 
0531 02/13/21 
2322 02/13/21 
1141 02/13/21 
0512 02/13/21 
0059 GLUCPOC 125* 134* 83 96 125* Recent Labs  
  02/14/21 
0331 02/13/21 0222 02/12/21 [id number] 68 71 79 02/12/21 
0351 WBC 13.9* 13.3* 10.1 8.1 HGB 10.7* 10.5* 10.3* 10.4* HCT 31.8* 32.1* 30.9* 32.7*  
PLT 44* 37* 39* 43* INR 2.1 2.3  --  2.2 Recent Labs  
  02/14/21 
0331 02/13/21 0222 02/12/21 
0351  138 142  
K 4.2 3.8 3.5 * 109* 110* CO2 24 26 24 * 156* 139* BUN 25* 21 27* CREA 0.96 0.74* 0.88  
MG  --   --  2.1 PHOS  --   --  2.2*  
CA 7.7* 7.8* 7.8* ALB 2.2* 2.8* 2.5* TBILI 8.8* 6.8* 5.3* ALT 23 20 19 Recent Labs  
  02/14/21 0302 02/13/21 0327 02/12/21 
7845 PHI 7.34* 7.31* 7.28* PCO2I 40.2 41.8 51.6*  
PO2I 76 77 82 HCO3I 21.9* 21.2* 24.1 No results for input(s): LCAD, LAC in the last 72 hours. Recent Labs  
  02/14/21 0302 02/13/21 0327 02/12/21 
2615 PHI 7.34* 7.31* 7.28* PCO2I 40.2 41.8 51.6*  
 PO2I 76 77 82 HCO3I 21.9* 21.2* 24.1 Patient Active Problem List  
Diagnosis Code  Hyponatremia E87.1  Liver cirrhosis ([facility] K74.60  Ascites R18.8  Elevated bilirubin R17  Alcohol dependence (HCC) F10.20  
 Nicotine dependence F17.200  Thrombocytopenia ([facility] D69.6  Diarrhea R19.7  DNR (do not resuscitate) 466 8916  Hepatitis C antibody positive in blood R76.8  Anemia D64.9  
 Hematemesis with nausea K92.0  Hemorrhagic shock (HCC) R57.8  Esophageal bleeding K22.8  Encounter for weaning from ventilator [facility]) Z99.11  Severe protein-calorie malnutrition ([facility] E43 Assessment:  (Medical Decision Making) Hospital Problems  Date Reviewed: 2/6/2021 Codes Class Noted POA Anemia ICD-10-CM: D64.9 ICD-9-CM: 285.9  2/6/2021 Unknown Stable * (Principal) Hematemesis with nausea ICD-10-CM: K92.0 ICD-9-CM: 578.0, 787.02  2/6/2021 Unknown Resolved. Hemorrhagic shock ([facility] ICD-10-CM: R57.8 ICD-9-CM: 785.59  2/6/2021 Unknown Still on levo at 18 Esophageal bleeding ICD-10-CM: K22.8 ICD-9-CM: 530.82  2/6/2021 Unknown Encounter for weaning from ventilator [facility]) ICD-10-CM: Z99.11 ICD-9-CM: V46.13  2/6/2021 Unknown Too sick weaning Severe protein-calorie malnutrition ([facility] ICD-10-CM: V05 ICD-9-CM: 038  2/6/2021 Unknown Hepatitis C antibody positive in blood ICD-10-CM: R76.8 ICD-9-CM: 795.79  12/12/2020 Yes Liver cirrhosis (HCC) ICD-10-CM: K74.60 ICD-9-CM: 571.5  12/10/2020 Yes Severe Ascites ICD-10-CM: R18.8 ICD-9-CM: 789.59  12/10/2020 Yes Will likely need paracentesis to be able to wean vent. Encephalopathy: severe. Neuro ordered MRI. Need consent from family Plan:  (Medical Decision Making) 60 M with cirrhosis who presented with UGI bleeding s/p scope revealing friable mucosa and ulcerated esophagus with hemorrhaging s/p intervention with GI Continue vent support. MRI when able to get consent. Continue ABx for aspiration pneumonia. CXR improved over past few days. Follow coags; INR 2.1 today. Continue lactulose enemas 
TPN. PPX: on protonix, SCDs, holding chemical DVT PPX given concerns for esophageal bleeding and thrombocytopenia and elevated INR. Paracentesis with IR on Monday if able to get consent. May need FFP and platelets but let IR decide. Prognosis guarded Full Code The patient is critically ill with respiratory failure, circulatory failure and requires high complexity decision making for assessment and support including frequent ventilator adjustment , frequent evaluation and titration of therapies , application of advanced monitoring technologies and extensive interpretation of multiple databases Los Champion MD

## 2021-02-14 NOTE — PROGRESS NOTES
Shift report received from [facility]. Pt resting in bed on vent. VSS. Pt repositioned. Fentanyl signed off in [facility]. Abdomen is very distended, skin is jaundiced, pt not following commands, and minimally responds to pain.

## 2021-02-14 NOTE — PROGRESS NOTES
Neurology Daily Progress Note Assessment:  
 
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 Comatosed. EEG showed BiPLEDs, consistent with most likely an underlying hypoperfusional injury. Likely poor prognosis. Recommend palliative care consult to discuss goals of care. Will obtain MRI of brain for prognostication. Plan: MRI of brain pending for further prognostication. Continue supportive therapies Recommend palliative care consult to discuss goals of care. Subjective: Interval history: No clinical change. Remains intubated, on fentanyl. On pressors. MRI of brain pending. History: 
 
Chari Montana is a 60 y.o. male who is being seen for AMS, seizure like activity. Review of systems negative with exception of pertinent positives and negatives noted above. Objective:  
 
Vitals:  
 02/14/21 0830 02/14/21 0838 02/14/21 0845 02/14/21 0900 BP:      
Pulse: (!) 115 (!) 113 (!) 117 (!) 117 Resp: 27 28 25 27 Temp: 97.7 °F (36.5 °C)  97.7 °F (36.5 °C) 97.7 °F (36.5 °C) SpO2: 96% 96% 94% 95% Weight:      
Height:      
  
 
 
Current Facility-Administered Medications:  
  VANCOMYCIN INTERMITTENT DOSING PER PHARMACY, , Other, Rx Dosing/Monitoring, Luis Sun MD 
  meropenem (MERREM) 500 mg in 0.9% sodium chloride (MBP/ADV) 50 mL MBP, 0.5 g, IntraVENous, Q6H, Mary Olguin MD, Last Rate: 100 mL/hr at 02/14/21 0803, 500 mg at 02/14/21 0803 
  NOREPINephrine (LEVOPHED) 16,000 mcg in dextrose 5% 250 mL infusion, 0.5-30 mcg/min, IntraVENous, TITRATE, Luis Sun MD, Stopped at 02/14/21 9750   TPN ADULT-CENTRAL - dextrose 15% amino acid 5%, , IntraVENous, QPM, Luis Sun MD, Last Rate: 75 mL/hr at 02/13/21 1738, New Bag at 02/13/21 1738   insulin regular (NOVOLIN R, HUMULIN R) injection, , SubCUTAneous, Q6H, Erika Ba MD, Stopped at 02/13/21 0300   carboxymethylcellulose sodium (REFRESH LIQUIGEL) 1 % ophthalmic solution 1 Drop, 1 Drop, Both Eyes, PRN, Morris Dailey MD, 1 Drop at 02/13/21 2205   ofloxacin (Ocuflox) 0.3 % ophthalmic solution (Patient's Own Med) (Patient Supplied), 1 Drop, Left Eye, QID, Morris Dailey MD, 1 Drop at 02/14/21 0816 
  haloperidol lactate (HALDOL) injection 2 mg, 2 mg, IntraVENous, Q8H PRN, Sally Patel MD, 2 mg at 02/14/21 9126   propofol (DIPRIVAN) 10 mg/mL infusion, 0-50 mcg/kg/min, IntraVENous, TITRATE, Sally Patel MD, Stopped at 02/13/21 5204   fentaNYL in normal saline (pf) 25 mcg/mL infusion, 0-200 mcg/hr, IntraVENous, TITRATE, Sally Patel MD, Last Rate: 2 mL/hr at 02/14/21 0745, 50 mcg/hr at 02/14/21 0745   fat emulsion 20% (LIPOSYN, INTRAlipid) infusion 250 mL, 250 mL, IntraVENous, QPM, Sally Patel MD, 250 mL at 02/13/21 1737 
  NUTRITIONAL SUPPORT ELECTROLYTE PRN ORDERS, , Does Not Apply, PRN, Lula Ortega MD 
  lactulose (CHRONULAC) 10 gram/15 mL solution 300 mL, 200 g, Rectal, TID, Meera Campos NP, 300 mL at 02/14/21 0900 
  nicotine (NICODERM CQ) 14 mg/24 hr patch 1 Patch, 1 Patch, TransDERmal, Q24H, Sally Patel MD, 1 Patch at 02/13/21 1613 
  albuterol-ipratropium (DUO-NEB) 2.5 MG-0.5 MG/3 ML, 3 mL, Nebulization, Q6H PRN, Sally Patel MD 
  0.9% sodium chloride infusion 250 mL, 250 mL, IntraVENous, PRN, Yazan Dang MD 
  0.9% sodium chloride infusion 250 mL, 250 mL, IntraVENous, PRN, Genesis Brown NP 
  pantoprazole (PROTONIX) 40 mg in 0.9% sodium chloride 10 mL injection, 40 mg, IntraVENous, Q12H, Genesis Brown NP, 40 mg at 02/14/21 1946   ondansetron (ZOFRAN) injection 4 mg, 4 mg, IntraVENous, Q4H PRN, Bradford Holley MD 
  prochlorperazine (COMPAZINE) with saline injection 10 mg, 10 mg, IntraVENous, Q6H PRN, Bradford Holley MD 
  sodium chloride (NS) flush 5-40 mL, 5-40 mL, IntraVENous, PRN, Bradford Holley MD 
   albuterol (PROVENTIL VENTOLIN) nebulizer solution 2.5 mg, 2.5 mg, Inhalation, PRN, Laxmi Maciel MD, 2.5 mg at 02/09/21 1645   sodium chloride (NS) flush 5-40 mL, 5-40 mL, IntraVENous, Q8H, Heather Lester MD, 10 mL at 02/14/21 0532   sodium chloride (NS) flush 5-40 mL, 5-40 mL, IntraVENous, PRN, Heather Lester MD 
  acetaminophen (TYLENOL) tablet 650 mg, 650 mg, Oral, Q6H PRN **OR** [DISCONTINUED] acetaminophen (TYLENOL) suppository 650 mg, 650 mg, Rectal, Q6H PRN, Heather Lester MD 
  polyethylene glycol (MIRALAX) packet 17 g, 17 g, Oral, DAILY PRN, Heather Lester MD 
  promethazine (PHENERGAN) tablet 12.5 mg, 12.5 mg, Oral, Q6H PRN **OR** ondansetron (ZOFRAN) injection 4 mg, 4 mg, IntraVENous, Q6H PRN, Heather Lester MD 
  0.9% sodium chloride infusion 250 mL, 250 mL, IntraVENous, PRN, Heather Lester MD 
  0.9% sodium chloride infusion 250 mL, 250 mL, IntraVENous, PRN, Pamela Dailey MD 
 
Recent Results (from the past 12 hour(s)) GLUCOSE, POC Collection Time: 02/13/21 11:22 PM  
Result Value Ref Range Glucose (POC) 134 (H) 65 - 100 mg/dL POC G3 Collection Time: 02/14/21  3:02 AM  
Result Value Ref Range Device: VENT    
 FIO2 (POC) 45 % pH (POC) 7.34 (L) 7.35 - 7.45    
 pCO2 (POC) 40.2 35 - 45 MMHG  
 pO2 (POC) 76 75 - 100 MMHG  
 HCO3 (POC) 21.9 (L) 22 - 26 MMOL/L  
 sO2 (POC) 94 (L) 95 - 98 % Base deficit (POC) 4 mmol/L Mode ASSIST CONTROL Tidal volume 500 ml Set Rate 24 bpm  
 PEEP/CPAP (POC) 8 cmH2O Allens test (POC) NOT APPLICABLE Inspiratory Time 0.9 sec Site DRAWN FROM ARTERIAL LINE Specimen type (POC) ARTERIAL Performed by DavisConnorRRT   
 CO2, POC 23 MMOL/L Respiratory comment: NurseNotified Exhaled minute volume 14.60 L/min COLLECT TIME 258 HEPATIC FUNCTION PANEL Collection Time: 02/14/21  3:31 AM  
Result Value Ref Range Protein, total 4.4 (L) 6.3 - 8.2 g/dL Albumin 2.2 (L) 3.2 - 4.6 g/dL Globulin 2.2 (L) 2.3 - 3.5 g/dL A-G Ratio 1.0 (L) 1.2 - 3.5 Bilirubin, total 8.8 (H) 0.2 - 1.1 MG/DL Bilirubin, direct 6.6 (H) <0.4 MG/DL Alk. phosphatase 105 50 - 136 U/L  
 AST (SGOT) 62 (H) 15 - 37 U/L  
 ALT (SGPT) 23 12 - 65 U/L  
PROTHROMBIN TIME + INR Collection Time: 02/14/21  3:31 AM  
Result Value Ref Range Prothrombin time 23.6 (H) 12.5 - 14.7 sec INR 2.1 METABOLIC PANEL, BASIC Collection Time: 02/14/21  3:31 AM  
Result Value Ref Range Sodium 137 136 - 145 mmol/L Potassium 4.2 3.5 - 5.1 mmol/L Chloride 110 (H) 98 - 107 mmol/L  
 CO2 24 21 - 32 mmol/L Anion gap 3 (L) 7 - 16 mmol/L Glucose 136 (H) 65 - 100 mg/dL BUN 25 (H) 8 - 23 MG/DL Creatinine 0.96 0.8 - 1.5 MG/DL  
 GFR est AA >60 >60 ml/min/1.73m2 GFR est non-AA >60 >60 ml/min/1.73m2 Calcium 7.7 (L) 8.3 - 10.4 MG/DL  
CBC W/O DIFF Collection Time: 02/14/21  3:31 AM  
Result Value Ref Range WBC 13.9 (H) 4.3 - 11.1 K/uL  
 RBC 3.14 (L) 4.23 - 5.6 M/uL  
 HGB 10.7 (L) 13.6 - 17.2 g/dL HCT 31.8 (L) 41.1 - 50.3 % .3 (H) 79.6 - 97.8 FL  
 MCH 34.1 (H) 26.1 - 32.9 PG  
 MCHC 33.6 31.4 - 35.0 g/dL  
 RDW 17.4 (H) 11.9 - 14.6 % PLATELET 44 (L) 556 - 450 K/uL MPV 11.7 9.4 - 12.3 FL ABSOLUTE NRBC 0.00 0.0 - 0.2 K/uL GLUCOSE, POC Collection Time: 02/14/21  5:31 AM  
Result Value Ref Range Glucose (POC) 125 (H) 65 - 100 mg/dL Louise Casey Collection Time: 02/14/21  6:39 AM  
Result Value Ref Range Vancomycin,trough 27.3 (HH) 5 - 20 ug/mL Physical Exam: 
General - ill appearing, cachetic, in no apparent distress. Intubated. HEENT - Normocephalic, atraumatic. Conjunctiva are clear. Neck - Supple without masses Cardiovascular - Regular rate and rhythm. Normal S1, S2 without murmurs, rubs, or gallops. Lungs - Clear to auscultation. Abdomen - Soft, nontender with normal bowel sounds. Extremities - Peripheral pulses intact. No edema and no rashes. Neurological examination Level of consciousness- comatosed, Intubated Brain stem reflexes 
-Pupillary reflex to bright light: 2 mm PERRL 
-Ciliospinal reflexes: absent 
-Oculovestibular and/or oculocephalic reflex response: absent 
-Corneal reflex: present 
-Facial movement to painful stimulus at supraorbital nerve, temporomandibular joint: absent 
-Cough/gag reflex: absent Motor examination 
-Muscle tone: flaccid 
-Motor response to pain: does not respond to pain in all extremities -Muscle stretch reflexes: areflexic 
-Response to plantar stimulation: mute Signed By: Chantal Romero NP February 14, 2021 Neurology attending Patient seen and examined on rounds Developments of last 24 hours reviewed and have discussed situation with staff. Exam findings are as above. Has sufficient hypotonia that really 1 can make no observation with reference to metabolic flap MRI is pending at this point and will review when complete As noted yesterday overall prognosis is extremely poor

## 2021-02-14 NOTE — PROGRESS NOTES
Ventilator check complete; patient has a #8. 0 ET tube secured at the 26 at the lip. Breath sounds are coarse and diminished. Trachea is midline, Negative for subcutaneous air, and chest excursion is symmetric. Patient is also Negative for cyanosis and is Negative for pitting edema. All alarms are set and audible. Resuscitation bag is at the head of the bed. Ventilator Settings Mode FIO2 Rate Tidal Volume Pressure PEEP I:E Ratio VC+  41 %    0.5 ml  12 cm H2O  8 cm H20  1:1.8 Peak airway pressure: 26 cm H2O Minute ventilation: 14.6 l/min ABG: No results for input(s): PH, PCO2, PO2, HCO3 in the last 72 hours.  
 
 
Bharti العلي, RT

## 2021-02-14 NOTE — PROGRESS NOTES
Pharmacokinetic Consult to Pharmacist 
 
Gomez Coyle is a 60 y.o. male being treated with vancomycin. Height: 6' 7\" (200.7 cm)(per EMR)  Weight: 114.9 kg (253 lb 4.9 oz) Lab Results Component Value Date/Time BUN 25 (H) 02/14/2021 03:31 AM  
 Creatinine 0.96 02/14/2021 03:31 AM  
 WBC 13.9 (H) 02/14/2021 03:31 AM  
  
Estimated Creatinine Clearance: 112.4 mL/min (based on SCr of 0.96 mg/dL). CULTURES: 
Pending Lab Results Component Value Date/Time Vancomycin,trough 27.3 (HH) 02/14/2021 06:39 AM  
 
 
Day 5 of vancomycin. Goal trough is 15-20. Vancomycin trough obtained this morning resulted at 27.3. Will change to intermittent dosing of vancomycin for now. Will continue to follow patient and order levels when clinically indicated. Thank you, Bob Goetz, PharmD, [facility] Clinical [facility] Special[facility] 
[phone]

## 2021-02-14 NOTE — PROGRESS NOTES
Bedside and Verbal shift change report given to [address] (oncoming nurse) by Mary Wall RN (offgoing nurse). Report included the following information SBAR, Kardex, ED Summary, Procedure Summary, Intake/Output, MAR, Recent Results, Med Rec Status, Cardiac Rhythm NSR/ST and Alarm Parameters .

## 2021-02-15 NOTE — PROGRESS NOTES
Artline waveform dampened. Upon assessment the art-line suture had fallen out and art-line catheter out approximately 1.5 inches. Artline removed and pressure held on site for 2 minutes. Cuff pressure is correlating to art-line pressures. Pt off of pressors. VSS. Will continue to monitor.

## 2021-02-15 NOTE — PROGRESS NOTES
Patient made comfort care and just passed away. No pulse or spontaneous respiration. Time of death is 13.28. Family aware and left already Georges Churchill MD

## 2021-02-15 NOTE — PROGRESS NOTES
Critical Care Daily Progress Note: 2/15/2021 Fortino Marrero Admission Date: 2/6/2021 The patient's chart is reviewed and the patient is discussed with the staff. Patient is a 60 y.o.  male presents with hematemesis. Patient per notes from ER and chart has history of ETOH abuse and had dark emesis for 3 days. Found down by family and unresponsive with SBP of 80/42 and hg of 8.7. Per chart son told staff stopped ETOH about 2 months after years of a case a day. Also using NSAIDS, TUMS and pepto bismo. Recently noted to have HCV+ and cirrhotic on US. 
  
Patient seen by GI STAT and felt unstable from likely decompensated cirrhosis and suspected PVT. The did EGD and Anesthesia intubated patient and took to OR.  
  
GI endoscopy noted to have diffusely ulcerated esophagus. No active varices or ulcer noted. Tissue was reported friable and used hemospray instead and coated esophagus.  
  
Get get 2 units of PRBC and ordered FFP and platelets. Patient now in ICU on Vent and we were asked to take over care now.  
  
Neuro seeing for encephalopathy.  
  
    
Subjective:  
 
Mental status is slightly improved today. Awaiting MRI and paracentesis - spoke with the son this morning who will be coming into the hospital. Hb and plts have remained stable and patient is off pressors and minimal vent settings. Current Facility-Administered Medications Medication Dose Route Frequency  vancomycin (VANCOCIN) 750 mg in 0.9% sodium chloride 250 mL (VIAL-MATE)  750 mg IntraVENous ONCE  
 VANCOMYCIN INTERMITTENT DOSING PER PHARMACY   Other Rx Dosing/Monitoring  albuterol (PROVENTIL VENTOLIN) nebulizer solution 2.5 mg  2.5 mg Nebulization QID RT  
 TPN ADULT-CENTRAL - dextrose 15% amino acid 5%   IntraVENous QPM  
 meropenem (MERREM) 500 mg in 0.9% sodium chloride (MBP/ADV) 50 mL MBP  0.5 g IntraVENous Q6H  
  NOREPINephrine (LEVOPHED) 16,000 mcg in dextrose 5% 250 mL infusion  0.5-30 mcg/min IntraVENous TITRATE  carboxymethylcellulose sodium (REFRESH LIQUIGEL) 1 % ophthalmic solution 1 Drop  1 Drop Both Eyes PRN  
 ofloxacin (Ocuflox) 0.3 % ophthalmic solution (Patient's Own Med) (Patient Supplied)  1 Drop Left Eye QID  haloperidol lactate (HALDOL) injection 2 mg  2 mg IntraVENous Q8H PRN  propofol (DIPRIVAN) 10 mg/mL infusion  0-50 mcg/kg/min IntraVENous TITRATE  fentaNYL in normal saline (pf) 25 mcg/mL infusion  0-200 mcg/hr IntraVENous TITRATE  fat emulsion 20% (LIPOSYN, INTRAlipid) infusion 250 mL  250 mL IntraVENous QPM  
 NUTRITIONAL SUPPORT ELECTROLYTE PRN ORDERS   Does Not Apply PRN  
 lactulose (CHRONULAC) 10 gram/15 mL solution 300 mL  200 g Rectal TID  nicotine (NICODERM CQ) 14 mg/24 hr patch 1 Patch  1 Patch TransDERmal Q24H  
 0.9% sodium chloride infusion 250 mL  250 mL IntraVENous PRN  
 0.9% sodium chloride infusion 250 mL  250 mL IntraVENous PRN  pantoprazole (PROTONIX) 40 mg in 0.9% sodium chloride 10 mL injection  40 mg IntraVENous Q12H  
 ondansetron (ZOFRAN) injection 4 mg  4 mg IntraVENous Q4H PRN  prochlorperazine (COMPAZINE) with saline injection 10 mg  10 mg IntraVENous Q6H PRN  
 sodium chloride (NS) flush 5-40 mL  5-40 mL IntraVENous PRN  
 albuterol (PROVENTIL VENTOLIN) nebulizer solution 2.5 mg  2.5 mg Inhalation PRN  
 sodium chloride (NS) flush 5-40 mL  5-40 mL IntraVENous Q8H  
 sodium chloride (NS) flush 5-40 mL  5-40 mL IntraVENous PRN  
 acetaminophen (TYLENOL) tablet 650 mg  650 mg Oral Q6H PRN  polyethylene glycol (MIRALAX) packet 17 g  17 g Oral DAILY PRN  promethazine (PHENERGAN) tablet 12.5 mg  12.5 mg Oral Q6H PRN Or  
 ondansetron (ZOFRAN) injection 4 mg  4 mg IntraVENous Q6H PRN  
 0.9% sodium chloride infusion 250 mL  250 mL IntraVENous PRN  
 0.9% sodium chloride infusion 250 mL  250 mL IntraVENous PRN Review of Systems Unobtainable due to patient status. Objective:  
 
Vitals:  
 02/15/21 3517 02/15/21 0659 02/15/21 0800 02/15/21 1593 BP: (!) 94/55 (!) 92/54 Pulse: 100 95 (!) 105 (!) 108 Resp: 26 21 26 Temp: 36.3 °C (97.3 °F) 36.3 °C (97.3 °F) SpO2: 98% 97%  95% Weight:      
Height:      
 
 
Intake/Output Summary (Last 24 hours) at 2/15/2021 9423 Last data filed at 2/15/2021 5153 Gross per 24 hour Intake 2206.42 ml Output 1175 ml Net 1031.42 ml Physical Exam:         
Constitutional: frail and chonically-ill appearing, intubated EENMT:   jaundiced, pupils equal, oral mucosa moist 
Respiratory: ETT, vented, coarse rhonchi bilaterally Cardiovascular:  Tachy RRR, no M/R/G Gastrointestinal: + ascites, distended Musculoskeletal: warm without cyanosis. Trace significant LE edema; Scattered ecchymoses Neurologic: moving all 4 extremities spontaneously, not following commands Psychiatric:  Somnolent, off sedation LINES: 
Quad Lumen quad lumen 02/06/21 Left Internal jugular Urinary Catheter 02/06/21 2- way Airway - Endotracheal Tube 02/6 - extubated 2/9 - reintubated 2/10 DRIPS: 
 
Levophed off CXR:  
 
2/14, 13: 
 
 
 
 
 
 
2/10 - worsening bilateral airspace disease (R>L) LAB Recent Labs  
  02/14/21 
1812 02/14/21 
1145 02/14/21 
0531 02/13/21 
2322 02/13/21 
1141 GLUCPOC 112* 117* 125* 134* 83 Recent Labs  
  02/15/21 
0254 02/14/21 
0331 02/13/21 
0222 WBC 19.6* 13.9* 13.3* HGB 10.6* 10.7* 10.5* HCT 33.2* 31.8* 32.1*  
PLT 46* 44* 37* INR 1.8 2.1 2.3 Recent Labs  
  02/15/21 
0254 02/14/21 
0331 02/13/21 0222  137 138  
K 4.8 4.2 3.8 * 110* 109* CO2 23 24 26 * 136* 156* BUN 34* 25* 21  
CREA 1.16 0.96 0.74* MG 2.2  --   --   
PHOS 3.5  --   --   
CA 8.1* 7.7* 7.8* ALB 2.1* 2.2* 2.8* TBILI 10.0* 8.8* 6.8* ALT 22 23 20 Recent Labs  
  02/15/21 
0326 02/14/21 
0302 02/13/21 
0327 PHI 7.27* 7.34* 7.31* PCO2I 43.5 40.2 41.8 PO2I 73* 76 77 HCO3I 20.2* 21.9* 21.2* No results for input(s): LCAD, LAC in the last 72 hours. Recent Labs  
  02/15/21 
0326 02/14/21 
0302 02/13/21 
0327 PHI 7.27* 7.34* 7.31* PCO2I 43.5 40.2 41.8 PO2I 73* 76 77 HCO3I 20.2* 21.9* 21.2* Patient Active Problem List  
Diagnosis Code  Hyponatremia E87.1  Liver cirrhosis ([facility] K74.60  Ascites R18.8  Elevated bilirubin R17  Alcohol dependence (HCC) F10.20  
 Nicotine dependence F17.200  Thrombocytopenia ([facility] D69.6  Diarrhea R19.7  DNR (do not resuscitate) [phone]  Hepatitis C antibody positive in blood R76.8  Anemia D64.9  
 Hematemesis with nausea K92.0  Hemorrhagic shock (HCC) R57.8  Esophageal bleeding K22.8  Encounter for weaning from ventilator [facility]) Z99.11  Severe protein-calorie malnutrition ([facility] E43 Assessment:  (Medical Decision Making) Hospital Problems  Date Reviewed: 2/6/2021 Codes Class Noted POA Anemia ICD-10-CM: D64.9 ICD-9-CM: 285.9  2/6/2021 Unknown Stable * (Principal) Hematemesis with nausea ICD-10-CM: K92.0 ICD-9-CM: 578.0, 787.02  2/6/2021 Unknown Resolved. Hemorrhagic shock ([facility] ICD-10-CM: R57.8 ICD-9-CM: 785.59  2/6/2021 Unknown Still on levo at 18 Esophageal bleeding ICD-10-CM: K22.8 ICD-9-CM: 530.82  2/6/2021 Unknown Encounter for weaning from ventilator [facility]) ICD-10-CM: Z99.11 ICD-9-CM: V46.13  2/6/2021 Unknown Too sick weaning Severe protein-calorie malnutrition ([facility] ICD-10-CM: D36 ICD-9-CM: 858  2/6/2021 Unknown Hepatitis C antibody positive in blood ICD-10-CM: R76.8 ICD-9-CM: 795.79  12/12/2020 Yes Liver cirrhosis (HCC) ICD-10-CM: K74.60 ICD-9-CM: 571.5  12/10/2020 Yes Severe Ascites ICD-10-CM: R18.8 ICD-9-CM: 789.59  12/10/2020 Yes Will likely need paracentesis to be able to wean vent. Encephalopathy: severe. Neuro ordered MRI. Need consent from family Plan:  (Medical Decision Making) 60 M with cirrhosis who presented with UGI bleeding s/p scope revealing friable mucosa and ulcerated esophagus with hemorrhaging s/p intervention with GI 
 
-Continue vent support - current on minimal vent settings - mental status and ascites most limiting factors -May need to consider trach (intubated 2/6, extubated 2/9 but reintubated 2/10 due to likely  
 aspiration) -MRI when able to get consent. 
-Consulted IR for paracentesis  
-Continue ABx for aspiration pneumonia. CXR improved over past few days. 
-Follow coags and platelets; INR improving and plts 46k - no signs of active bleeding. 
-Continue lactulose enemas 
-TPN in setting of diffusely ulcerated esophagus - will need to consider long-term feeding options going forward pending patient's clinical improvements and vent requirements 
-PPX: on protonix, SCDs, holding chemical DVT PPX given concerns for esophageal bleeding and thrombocytopenia and elevated INR. Prognosis guarded - palliative care consulted Full Code Son Azalia Wilson) - can be reached at [phone] - updated this morning - planning to come into the hospital  
 
The patient is critically ill with respiratory failure, circulatory failure and requires high complexity decision making for assessment and support including frequent ventilator adjustment , frequent evaluation and titration of therapies , application of advanced monitoring technologies and extensive interpretation of multiple databases - 33 minutes Kristina Khan MD

## 2021-02-15 NOTE — CONSULTS
Palliative Care Patient: Chichi Callejas MRN: [id number]  SSN: [id number] YOB: 1955  Age: 60 y.o. Sex: male Date of Request: 2/15/2021 Date of Consult:  2/15/2021 Reason for Consult:  pain and symptom management, family support and education and goals of care Requesting Physician: Dr. Leo Arellano Assessment/Plan:  
 
Principal Diagnosis:   
Altered Mental Status R41.82 Additional Diagnoses: · Acute Respiratory Failure, Unspecified  J96.00 
· Frailty  R54 
· Pain, abdomen  R10.9 · Counseling, Encounter for Medical Advice  Z71.9 
· Encounter for Palliative Care  Z51.5 Palliative Performance Scale (PPS): 
  
 
Medical Decision Making:  
Reviewed and summarized labs and imaging. Met with son at bedside. Pt unresponsive. Updated on current condition and discussed poor prognosis given significant advanced disease processes. Son expressed his understanding and states he doesn't wish for pt to suffer. I discussed comfort measures protocol allowing pt to pass peacefully. Son is in agreement with plan DNR ordered Stop lab draws and unnecessary medications Robinul for secretions Morphine 2 mg iv q 30 min prn pain, dyspnea Ativan 1 mg iv q 30 min prn anxiety Extubate to NC once son is ready. Greater than 30 mins spent on advanced care planning Will discuss findings with members of the interdisciplinary team.   
 
Thank you for this referral.    
 
 
Subjective:  
 
History obtained from:  Family and Chart Chief Complaint: altered mental status History of Present Illness:  60 y.o.  male presents with hematemesis. Patient per notes from ER and chart has history of ETOH abuse and had dark emesis for 3 days. Found down by family and unresponsive with SBP of 80/42 and hg of 8.7. Per chart son told staff stopped ETOH about 2 months after years of a case a day. Also using NSAIDS, TUMS and pepto bismo.  Recently noted to have HCV+ and cirrhotic on US. 
  
 Patient seen by GI STAT and felt unstable from likely decompensated cirrhosis and suspected PVT. The did EGD and Anesthesia intubated patient and took to OR.  
  
GI endoscopy noted to have diffusely ulcerated esophagus. No active varices or ulcer noted. Tissue was reported friable and used hemospray instead and coated esophagus.  
  
Get get 2 units of PRBC and ordered FFP and platelets. Intubated and unresponsive Advance Directive: No      
Code Status:  DNR Health Care Power of : pt son is decision maker Past Medical History:  
Diagnosis Date  Cirrhosis ([address] Past Surgical History:  
Procedure Laterality Date  IR PARACENTESIS ABD W IMAGE  12/11/2020 No family history on file. Social History Tobacco Use  Smoking status: Not on file Substance Use Topics  Alcohol use: Not on file Prior to Admission medications Medication Sig Start Date End Date Taking? Authorizing Provider LORazepam (ATIVAN) 1 mg tablet Take 1 Tab by mouth every eight (8) hours as needed (alcohol withdrawal). Max Daily Amount: 3 mg. 12/12/20   Ciesco, Leonidas Krabbe,  No Known Allergies Review of systems unable to obtain due to mentation. Objective:  
 
Visit Vitals /73 Pulse (!) 106 Temp 96.9 °F (36.1 °C) Resp 23 Ht 6' 7\" (2.007 m) Wt 257 lb 8 oz (116.8 kg) SpO2 94% BMI 29.01 kg/m² Physical Exam: 
 
General:  unresponsive Eyes:  Conjunctivae/corneas clear Nose: Nares normal. Septum midline. Neck: Supple, symmetrical, trachea midline, no JVD Lungs:   Coarse bilateral bs Heart:  Regular rate and rhythm, no murmur Abdomen:   Soft, non-tender, non-distended. Positive bowel sounds Extremities: Normal, atraumatic, no cyanosis or edema Skin: Skin color, texture, turgor normal. No rash or lesions. Neurologic: unresponsive Psych: obtunded Assessment:  
 
Hospital Problems  Date Reviewed: 2/6/2021 Codes Class Noted POA Anemia ICD-10-CM: D64.9 ICD-9-CM: 285.9  2/6/2021 Unknown * (Principal) Hematemesis with nausea ICD-10-CM: K92.0 ICD-9-CM: 578.0, 787.02  2/6/2021 Unknown Hemorrhagic shock ([facility] ICD-10-CM: R57.8 ICD-9-CM: 785.59  2/6/2021 Unknown Esophageal bleeding ICD-10-CM: K22.8 ICD-9-CM: 530.82  2/6/2021 Unknown Encounter for weaning from ventilator [facility]) ICD-10-CM: Z99.11 ICD-9-CM: V46.13  2/6/2021 Unknown Severe protein-calorie malnutrition ([facility] ICD-10-CM: Q22 ICD-9-CM: 476  2/6/2021 Unknown Hepatitis C antibody positive in blood ICD-10-CM: R76.8 ICD-9-CM: 795.79  12/12/2020 Yes Liver cirrhosis (HCC) ICD-10-CM: K74.60 ICD-9-CM: 571.5  12/10/2020 Yes Ascites ICD-10-CM: R18.8 ICD-9-CM: 789.59  12/10/2020 Yes Signed By: Pamela Ness MD   
 February 15, 2021

## 2021-02-15 NOTE — PROGRESS NOTES
Chart reviewed as pt remains ICU intubated/ vent. 41% Fio2 per RT. Fentanyl gtt. Poor prognosis and Palliative care consulted. Son to visit. CM following for any assist. LOS 9 days.

## 2021-02-15 NOTE — DISCHARGE SUMMARY
Death Summary    Raj Dela Cruz  Admission date:  2/6/2021  Discharge date:  2/15/2021    Admitting Diagnosis:  Hemorrhagic shock ([facility] [R57.8]  Esophageal bleeding [K22.8]  Discharge Diagnosis:    Problem List as of 2/15/2021 Date Reviewed: 2/6/2021          Codes Class Noted - Resolved    * (Principal) Hematemesis with nausea ICD-10-CM: K92.0  ICD-9-CM: 578.0, 787.02  2/6/2021 - Present        Anemia ICD-10-CM: D64.9  ICD-9-CM: 285.9  2/6/2021 - Present        Esophageal bleeding ICD-10-CM: K22.8  ICD-9-CM: 530.82  2/6/2021 - Present        Encounter for weaning from ventilator [facility]) ICD-10-CM: Z99.11  ICD-9-CM: V46.13  2/6/2021 - Present        Liver cirrhosis ([facility] ICD-10-CM: K74.60  ICD-9-CM: 571.5  12/10/2020 - Present        Severe protein-calorie malnutrition ([facility] ICD-10-CM: E43  ICD-9-CM: 262  2/6/2021 - Present        Hepatitis C antibody positive in blood ICD-10-CM: R76.8  ICD-9-CM: 795.79  12/12/2020 - Present        Hemorrhagic shock ([facility] ICD-10-CM: R57.8  ICD-9-CM: 785.59  2/6/2021 - Present        Hyponatremia ICD-10-CM: E87.1  ICD-9-CM: 276.1  12/10/2020 - Present        Ascites ICD-10-CM: R18.8  ICD-9-CM: 789.59  12/10/2020 - Present        Elevated bilirubin ICD-10-CM: R17  ICD-9-CM: 277.4  12/10/2020 - Present        Alcohol dependence ([facility] ICD-10-CM: F10.20  ICD-9-CM: 303.90  12/10/2020 - Present        Nicotine dependence ICD-10-CM: F17.200  ICD-9-CM: 305.1  12/10/2020 - Present        Thrombocytopenia ([address] ICD-10-CM: D69.6  ICD-9-CM: 287.5  12/10/2020 - Present        Diarrhea ICD-10-CM: R19.7  ICD-9-CM: 787.91  12/10/2020 - Present        DNR (do not resuscitate) ICD-10-CM: Z66  ICD-9-CM: V49.86  12/10/2020 - Present              Consultants:  Hospitalist   Neurology  Palliative Care  Gastroenterology    Studies/Procedures:  Multiple labs and CXRs, EEG, arterial line placement, intubation x 2, EGD, central line placement    Hospital course:  Patient was admitted to hospital with dark emesis for 3 days prior to admission. Patient was found by family at home and unresponsive. He has a history of ETOH abuse and recently HCV + and liver cirrhotic on US. Patient was intubated for EGD and EGD found diffusely ulcerated esophagus with diffuse hemorrhage. He received 2 U PRBCs FFP and platelets and required low dose pressors. He was extubated 2/9. He had severe ascites but too unstable for paracentesis. Patient was not tolerating PO and he had increased confusion so lactulose enemas were started for hepatic encephalopathy. He remained somnolent and required re-intubation. CXR showed worsening infiltrates and patient was started on broad spectrum antibiotics. Patient had some rhythmic jerking concerning for seizures. EEG was negative for seizures but did show probable anoxic encephalopathy in the setting of hypoperfusion. Palliative care was consulted and family made the decision to withdraw care. Patient was extubated to room air at [phone] and pronounced dead at [phone]. Final:  --Pronounced dead at 1328. --Total discharge greater than 30 minutes in duration.     Ela Jackson MD    WEB Death completed now    Ela Jackson MD

## 2021-02-15 NOTE — PROGRESS NOTES
Ventilator check complete; patient has a #8. 0 ET tube secured at the 26 at the lip. Patient is sedated. Patient is not able to follow commands. Breath sounds are coarse. Trachea is midline, Negative for subcutaneous air, and chest excursion is symmetric. Patient is also Negative for cyanosis and is Positive for pitting edema. All alarms are set and audible. Resuscitation bag is at the head of the bed. Ventilator Settings Mode FIO2 Rate Tidal Volume Pressure PEEP I:E Ratio VC+  35 %    550 ml  12 cm H2O  8 cm H20  1:1.57 Peak airway pressure: 35 cm H2O Minute ventilation: 20 l/min Ming De La Garza, RT

## 2021-02-15 NOTE — PROGRESS NOTES
Nutrition: Acknowledge: TPN Management (Pulmonary) reassessment Diet: DIET NPO Current plan of care indicates comfort care. Nutrition assessment deferred as Nutrition Intervention is not warranted secondary to current care plan. Discussed with Milan Shin RN. If nutrition intervention desired, consult Nutrition. Shashi Mulligan RD, LDN on 2/15/2021 at 12:06 PM 
Contact: [phone]

## 2021-02-15 NOTE — PROGRESS NOTES
Care Management Interventions PCP Verified by CM: No 
Mode of Transport at Discharge: Other (see comment) Transition of Care Consult (CM Consult): Discharge Planning Discharge Durable Medical Equipment: (walker) Current Support Network: Relative's Home Confirm Follow Up Transport: Family Freedom of Choice List was Provided with Basic Dialogue that Supports the Patient's Individualized Plan of Care/Goals, Treatment Preferences and Shares the Quality Data Associated with the Providers?: Yes [employer] Information Provided?: (no PAYOR source/DECO following) Discharge Location Discharge Placement:

## 2021-02-15 NOTE — PROGRESS NOTES
Pt made comfort care by family, son is bedside. Patient medicated per [facility], compassionately extubated to room air.  visited with son prior. All questions answered.

## 2021-02-15 NOTE — PROGRESS NOTES
Ventilator check complete; patient has a #8. 0 ET tube secured at the 26 at the lip. Patient is sedated. Patient is not able to follow commands. Breath sounds are coarse. Trachea is midline, Negative for subcutaneous air, and chest excursion is symmetric. Patient is also Negative for cyanosis and is Negative for pitting edema. All alarms are set and audible. Resuscitation bag is at the head of the bed. Ventilator Settings Mode FIO2 Rate Tidal Volume Pressure PEEP I:E Ratio VC+  41 % 24  500 ml    8 cm H20  1:1.8 Peak airway pressure: 20 cm H2O Minute ventilation: 11.9 l/min Joie Baca, RT

## 2021-02-15 NOTE — PROGRESS NOTES
Son stepped out for fresh air, called him to let him know patient just passed.  States he is on the way back to the hospital.

## 2021-02-15 NOTE — PROGRESS NOTES
Neurology Daily Progress Note Assessment:  
 
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 Comatose. EEG showed BiPLEDs, consistent with most likely an underlying hypoperfusional injury. Likely poor prognosis. The patient has been made comfort care. We will cancel further testing. Plan:  
 
Patient has been made comfort care. We will sign off. Subjective: Interval history: 
 
Remains intubated. Off sedation. Eyes open. Does not follow commands. Palliative care following and family has decided to make patient comfort care. History: 
 
Derik Chambers is a 60 y.o. male who is being seen for AMS, seizure like activity. Unable to obtain ROS due to AMS. Objective:  
 
Vitals:  
 02/15/21 1325 02/15/21 1326 02/15/21 1327 02/15/21 1328 BP:      
Pulse: (!) 49 (!) 33 (!) 0 (!) 0 Resp: (!) 6 (!) 4 (!) 0 (!) 0 Temp: (!) 95.9 °F (35.5 °C) (!) 95.9 °F (35.5 °C) (!) 95.9 °F (35.5 °C) (!) 95.9 °F (35.5 °C) SpO2: (!) 26% (!) 13% (!) 5% Weight:      
Height:      
  
 
 
Current Facility-Administered Medications:  
  morphine injection 2 mg, 2 mg, IntraVENous, Q30MIN PRN, Ayana Fountain MD, 2 mg at 02/15/21 1134   LORazepam (ATIVAN) injection 1 mg, 1 mg, IntraVENous, Q30MIN PRN, Ayana Fountain MD, 1 mg at 02/15/21 1312   albuterol (PROVENTIL VENTOLIN) nebulizer solution 2.5 mg, 2.5 mg, Nebulization, QID RT, Toño Styles MD, 2.5 mg at 02/15/21 4425   carboxymethylcellulose sodium (REFRESH LIQUIGEL) 1 % ophthalmic solution 1 Drop, 1 Drop, Both Eyes, PRN, Batool Dailey MD, 1 Drop at 02/14/21 7439   ofloxacin (Ocuflox) 0.3 % ophthalmic solution (Patient's Own Med) (Patient Supplied), 1 Drop, Left Eye, QID, Batool Dailey MD, 1 Drop at 02/15/21 08 
  haloperidol lactate (HALDOL) injection 2 mg, 2 mg, IntraVENous, Q8H PRN, Brandi Elizabeth MD, 2 mg at 02/14/21 4234   fat emulsion 20% (LIPOSYN, INTRAlipid) infusion 250 mL, 250 mL, IntraVENous, QPM, Jodie Barriga MD, 250 mL at 02/14/21 1759 
  NUTRITIONAL SUPPORT ELECTROLYTE PRN ORDERS, , Does Not Apply, PRN, Anjali Mancilla MD 
  nicotine (NICODERM CQ) 14 mg/24 hr patch 1 Patch, 1 Patch, TransDERmal, Q24H, Jodie Barriga MD, 1 Patch at 02/14/21 1605 
  0.9% sodium chloride infusion 250 mL, 250 mL, IntraVENous, PRN, Yvette Dang MD 
  0.9% sodium chloride infusion 250 mL, 250 mL, IntraVENous, PRN, Juliet Cisneros NP 
  pantoprazole (PROTONIX) 40 mg in 0.9% sodium chloride 10 mL injection, 40 mg, IntraVENous, Q12H, Juliet Cisneros NP, 40 mg at 02/15/21 0817 
  ondansetron (ZOFRAN) injection 4 mg, 4 mg, IntraVENous, Q4H PRN, Bart Avila MD 
  prochlorperazine (COMPAZINE) with saline injection 10 mg, 10 mg, IntraVENous, Q6H PRN, Bart Avila MD 
  sodium chloride (NS) flush 5-40 mL, 5-40 mL, IntraVENous, PRN, Bart Avila MD 
  albuterol (PROVENTIL VENTOLIN) nebulizer solution 2.5 mg, 2.5 mg, Inhalation, PRN, Kayla Padilla MD, 2.5 mg at 02/09/21 1645   sodium chloride (NS) flush 5-40 mL, 5-40 mL, IntraVENous, Q8H, Bart Avila MD, 10 mL at 02/15/21 1242   sodium chloride (NS) flush 5-40 mL, 5-40 mL, IntraVENous, PRN, Bart Avila MD 
  acetaminophen (TYLENOL) tablet 650 mg, 650 mg, Oral, Q6H PRN **OR** [DISCONTINUED] acetaminophen (TYLENOL) suppository 650 mg, 650 mg, Rectal, Q6H PRN, Bart Avila MD 
  polyethylene glycol (MIRALAX) packet 17 g, 17 g, Oral, DAILY PRN, Bart Avila MD 
  promethazine (PHENERGAN) tablet 12.5 mg, 12.5 mg, Oral, Q6H PRN **OR** ondansetron (ZOFRAN) injection 4 mg, 4 mg, IntraVENous, Q6H PRN, Bart Avila MD 
  0.9% sodium chloride infusion 250 mL, 250 mL, IntraVENous, PRN, Bart Avila MD 
  0.9% sodium chloride infusion 250 mL, 250 mL, IntraVENous, PRN, Qi Dailey MD 
 
Recent Results (from the past 12 hour(s)) HEPATIC FUNCTION PANEL  
 Collection Time: 02/15/21  2:54 AM  
Result Value Ref Range Protein, total 4.5 (L) 6.3 - 8.2 g/dL Albumin 2.1 (L) 3.2 - 4.6 g/dL Globulin 2.4 2.3 - 3.5 g/dL A-G Ratio 0.9 (L) 1.2 - 3.5 Bilirubin, total 10.0 (H) 0.2 - 1.1 MG/DL Bilirubin, direct 7.7 (H) <0.4 MG/DL Alk. phosphatase 104 50 - 136 U/L  
 AST (SGOT) 56 (H) 15 - 37 U/L  
 ALT (SGPT) 22 12 - 65 U/L  
PROTHROMBIN TIME + INR Collection Time: 02/15/21  2:54 AM  
Result Value Ref Range Prothrombin time 20.7 (H) 12.5 - 14.7 sec INR 1.8 METABOLIC PANEL, BASIC Collection Time: 02/15/21  2:54 AM  
Result Value Ref Range Sodium 138 136 - 145 mmol/L Potassium 4.8 3.5 - 5.1 mmol/L Chloride 111 (H) 98 - 107 mmol/L  
 CO2 23 21 - 32 mmol/L Anion gap 4 (L) 7 - 16 mmol/L Glucose 114 (H) 65 - 100 mg/dL BUN 34 (H) 8 - 23 MG/DL Creatinine 1.16 0.8 - 1.5 MG/DL  
 GFR est AA >60 >60 ml/min/1.73m2 GFR est non-AA >60 >60 ml/min/1.73m2 Calcium 8.1 (L) 8.3 - 10.4 MG/DL  
CBC W/O DIFF Collection Time: 02/15/21  2:54 AM  
Result Value Ref Range WBC 19.6 (H) 4.3 - 11.1 K/uL  
 RBC 3.20 (L) 4.23 - 5.6 M/uL  
 HGB 10.6 (L) 13.6 - 17.2 g/dL HCT 33.2 (L) 41.1 - 50.3 % .8 (H) 79.6 - 97.8 FL  
 MCH 33.1 (H) 26.1 - 32.9 PG  
 MCHC 31.9 31.4 - 35.0 g/dL  
 RDW 17.7 (H) 11.9 - 14.6 % PLATELET 46 (L) 009 - 450 K/uL MPV 13.0 (H) 9.4 - 12.3 FL ABSOLUTE NRBC 0.00 0.0 - 0.2 K/uL MAGNESIUM Collection Time: 02/15/21  2:54 AM  
Result Value Ref Range Magnesium 2.2 1.8 - 2.4 mg/dL PHOSPHORUS Collection Time: 02/15/21  2:54 AM  
Result Value Ref Range Phosphorus 3.5 2.3 - 3.7 MG/DL Dianna Hof Collection Time: 02/15/21  2:54 AM  
Result Value Ref Range Vancomycin, random 19.5 UG/ML  
POC G3 Collection Time: 02/15/21  3:26 AM  
Result Value Ref Range Device: VENT    
 FIO2 (POC) 40 % pH (POC) 7.27 (L) 7.35 - 7.45    
 pCO2 (POC) 43.5 35 - 45 MMHG pO2 (POC) 73 (L) 75 - 100 MMHG  
 HCO3 (POC) 20.2 (L) 22 - 26 MMOL/L  
 sO2 (POC) 92 (L) 95 - 98 % Base deficit (POC) 6 mmol/L Mode ASSIST CONTROL Tidal volume 500 ml Set Rate 24 bpm  
 PEEP/CPAP (POC) 8 cmH2O Allens test (POC) NOT APPLICABLE Inspiratory Time 0.9 sec Site RIGHT BRACHIAL Specimen type (POC) ARTERIAL Performed by [employer]   
 CO2, POC 21 MMOL/L Respiratory comment: NurseNotified Exhaled minute volume 15.30 L/min COLLECT TIME 322 Physical Exam: 
General - ill appearing, cachetic, in no apparent distress. Intubated. HEENT - Normocephalic, atraumatic. Conjunctiva are clear. Neck - Supple without masses Extremities - Peripheral pulses intact. No edema and no rashes. Neurological examination Level of consciousness- comatose, Intubated Brain stem reflexes 
-Pupillary reflex to bright light: 2 mm PERRL 
-Ciliospinal reflexes: present 
-Oculovestibular and/or oculocephalic reflex response: absent 
-Corneal reflex: present 
-Facial movement to painful stimulus at supraorbital nerve, temporomandibular joint: absent 
-Cough/gag reflex: absent Motor examination 
-Muscle tone: flaccid 
-Motor response to pain: does not respond to pain in all extremities -Muscle stretch reflexes: areflexic 
-Response to plantar stimulation: mute Signed By: Kathi Hardy NP February 15, 2021

## 2021-02-16 NOTE — PROGRESS NOTES
This is a follow-up visit to the patient, providing a spiritual presence, emotional support and prayer. The patient appears to be sleeping. He is intubated and his son, Emmy Angeles present. Abdiel Key, [address] [facility], [facility]

## 2021-02-16 NOTE — PROGRESS NOTES
Patient  and his family members are grieving appropriately.  provided empathy, comfort, a spiritual presence, emotional support and prayer to the patient's son and daughter-in-law. TAMIKO Redmanin

## (undated) DEVICE — KENDALL RADIOLUCENT FOAM MONITORING ELECTRODE RECTANGULAR SHAPE: Brand: KENDALL

## (undated) DEVICE — CANNULA NSL ORAL AD FOR CAPNOFLEX CO2 O2 AIRLFE

## (undated) DEVICE — REM POLYHESIVE ADULT PATIENT RETURN ELECTRODE: Brand: VALLEYLAB

## (undated) DEVICE — FIAPC® PROBE W/ FILTER 2200 A OD 2.3MM/6.9FR; L 2.2M/7.2FT: Brand: ERBE

## (undated) DEVICE — BLOCK BITE AD 60FR W/ VELC STRP ADDRESSES MOST PT AND

## (undated) DEVICE — SYRINGE CATH TIP 50ML

## (undated) DEVICE — CONNECTOR TBNG OD5-7MM O2 END DISP

## (undated) DEVICE — HEMOSPRAY ENDOSCOPIC HEMOSTAT: Brand: HEMOSPRAY

## (undated) DEVICE — SYRINGE, LUER SLIP, STERILE, 60ML: Brand: MEDLINE